# Patient Record
Sex: FEMALE | NOT HISPANIC OR LATINO | Employment: PART TIME | URBAN - METROPOLITAN AREA
[De-identification: names, ages, dates, MRNs, and addresses within clinical notes are randomized per-mention and may not be internally consistent; named-entity substitution may affect disease eponyms.]

---

## 2020-05-08 ENCOUNTER — TELEPHONE (OUTPATIENT)
Dept: INTERNAL MEDICINE CLINIC | Facility: CLINIC | Age: 75
End: 2020-05-08

## 2020-05-08 DIAGNOSIS — R25.1 TREMOR: Primary | ICD-10-CM

## 2020-05-12 ENCOUNTER — TELEPHONE (OUTPATIENT)
Dept: NEUROLOGY | Facility: CLINIC | Age: 75
End: 2020-05-12

## 2020-05-26 ENCOUNTER — CONSULT (OUTPATIENT)
Dept: NEUROLOGY | Facility: CLINIC | Age: 75
End: 2020-05-26
Payer: MEDICARE

## 2020-05-26 VITALS
WEIGHT: 159 LBS | HEART RATE: 83 BPM | DIASTOLIC BLOOD PRESSURE: 76 MMHG | TEMPERATURE: 98 F | SYSTOLIC BLOOD PRESSURE: 124 MMHG

## 2020-05-26 DIAGNOSIS — R25.1 TREMOR: ICD-10-CM

## 2020-05-26 PROCEDURE — 99203 OFFICE O/P NEW LOW 30 MIN: CPT | Performed by: PSYCHIATRY & NEUROLOGY

## 2020-05-26 RX ORDER — EZETIMIBE 10 MG/1
TABLET ORAL DAILY
COMMUNITY
Start: 2019-04-01

## 2020-05-26 RX ORDER — POTASSIUM CHLORIDE 750 MG/1
1 CAPSULE, EXTENDED RELEASE ORAL DAILY
COMMUNITY
Start: 2019-04-01

## 2020-05-26 RX ORDER — TRIAMTERENE AND HYDROCHLOROTHIAZIDE 37.5; 25 MG/1; MG/1
1 CAPSULE ORAL DAILY
COMMUNITY
Start: 2019-04-01

## 2020-06-04 ENCOUNTER — TRANSCRIBE ORDERS (OUTPATIENT)
Dept: LAB | Facility: CLINIC | Age: 75
End: 2020-06-04

## 2020-06-04 ENCOUNTER — APPOINTMENT (OUTPATIENT)
Dept: LAB | Facility: CLINIC | Age: 75
End: 2020-06-04
Payer: MEDICARE

## 2020-06-04 DIAGNOSIS — R25.1 TREMOR: ICD-10-CM

## 2020-06-04 LAB
ALBUMIN SERPL BCP-MCNC: 4.2 G/DL (ref 3.5–5)
ALP SERPL-CCNC: 66 U/L (ref 46–116)
ALT SERPL W P-5'-P-CCNC: 101 U/L (ref 12–78)
ANION GAP SERPL CALCULATED.3IONS-SCNC: 11 MMOL/L (ref 4–13)
AST SERPL W P-5'-P-CCNC: 45 U/L (ref 5–45)
BILIRUB SERPL-MCNC: 0.54 MG/DL (ref 0.2–1)
BUN SERPL-MCNC: 19 MG/DL (ref 5–25)
CALCIUM SERPL-MCNC: 9 MG/DL (ref 8.3–10.1)
CHLORIDE SERPL-SCNC: 100 MMOL/L (ref 100–108)
CO2 SERPL-SCNC: 27 MMOL/L (ref 21–32)
CREAT SERPL-MCNC: 1.01 MG/DL (ref 0.6–1.3)
GFR SERPL CREATININE-BSD FRML MDRD: 56 ML/MIN/1.73SQ M
GLUCOSE P FAST SERPL-MCNC: 92 MG/DL (ref 65–99)
POTASSIUM SERPL-SCNC: 4 MMOL/L (ref 3.5–5.3)
PROT SERPL-MCNC: 7.9 G/DL (ref 6.4–8.2)
SODIUM SERPL-SCNC: 138 MMOL/L (ref 136–145)
T4 FREE SERPL-MCNC: 0.95 NG/DL (ref 0.76–1.46)
TSH SERPL DL<=0.05 MIU/L-ACNC: 4.36 UIU/ML (ref 0.36–3.74)

## 2020-06-04 PROCEDURE — 82390 ASSAY OF CERULOPLASMIN: CPT

## 2020-06-04 PROCEDURE — 82525 ASSAY OF COPPER: CPT

## 2020-06-04 PROCEDURE — 86235 NUCLEAR ANTIGEN ANTIBODY: CPT

## 2020-06-04 PROCEDURE — 80053 COMPREHEN METABOLIC PANEL: CPT

## 2020-06-04 PROCEDURE — 84443 ASSAY THYROID STIM HORMONE: CPT

## 2020-06-04 PROCEDURE — 36415 COLL VENOUS BLD VENIPUNCTURE: CPT

## 2020-06-04 PROCEDURE — 84439 ASSAY OF FREE THYROXINE: CPT

## 2020-06-04 PROCEDURE — 86618 LYME DISEASE ANTIBODY: CPT

## 2020-06-05 ENCOUNTER — TELEPHONE (OUTPATIENT)
Dept: NEUROLOGY | Facility: CLINIC | Age: 75
End: 2020-06-05

## 2020-06-05 LAB
B BURGDOR IGG+IGM SER-ACNC: <0.91 ISR (ref 0–0.9)
CERULOPLASMIN SERPL-MCNC: 23.6 MG/DL (ref 19–39)
ENA SS-A AB SER-ACNC: <0.2 AI (ref 0–0.9)
ENA SS-B AB SER-ACNC: <0.2 AI (ref 0–0.9)

## 2020-06-09 ENCOUNTER — HOSPITAL ENCOUNTER (OUTPATIENT)
Dept: MRI IMAGING | Facility: HOSPITAL | Age: 75
Discharge: HOME | End: 2020-06-09
Attending: PSYCHIATRY & NEUROLOGY
Payer: MEDICARE

## 2020-06-09 DIAGNOSIS — R25.1 TREMOR: ICD-10-CM

## 2020-06-09 PROCEDURE — 70551 MRI BRAIN STEM W/O DYE: CPT

## 2020-06-10 ENCOUNTER — TELEMEDICINE (OUTPATIENT)
Dept: INTERNAL MEDICINE CLINIC | Facility: CLINIC | Age: 75
End: 2020-06-10
Payer: MEDICARE

## 2020-06-10 DIAGNOSIS — E78.00 PURE HYPERCHOLESTEROLEMIA: Primary | ICD-10-CM

## 2020-06-10 DIAGNOSIS — N18.30 CKD (CHRONIC KIDNEY DISEASE) STAGE 3, GFR 30-59 ML/MIN (HCC): ICD-10-CM

## 2020-06-10 DIAGNOSIS — R60.0 EDEMA, LOWER EXTREMITY: ICD-10-CM

## 2020-06-10 LAB — COPPER SERPL-MCNC: 100 UG/DL (ref 72–166)

## 2020-06-10 PROCEDURE — 99214 OFFICE O/P EST MOD 30 MIN: CPT | Performed by: INTERNAL MEDICINE

## 2020-06-19 ENCOUNTER — OFFICE VISIT (OUTPATIENT)
Dept: NEUROLOGY | Facility: CLINIC | Age: 75
End: 2020-06-19
Payer: MEDICARE

## 2020-06-19 VITALS — SYSTOLIC BLOOD PRESSURE: 124 MMHG | TEMPERATURE: 97.6 F | DIASTOLIC BLOOD PRESSURE: 84 MMHG | WEIGHT: 193 LBS

## 2020-06-19 DIAGNOSIS — R25.1 TREMOR: Primary | ICD-10-CM

## 2020-06-19 PROCEDURE — 99215 OFFICE O/P EST HI 40 MIN: CPT | Performed by: PSYCHIATRY & NEUROLOGY

## 2020-09-10 DIAGNOSIS — R25.1 TREMOR: ICD-10-CM

## 2020-09-24 ENCOUNTER — OFFICE VISIT (OUTPATIENT)
Dept: NEUROLOGY | Facility: CLINIC | Age: 75
End: 2020-09-24
Payer: MEDICARE

## 2020-09-24 VITALS
HEART RATE: 95 BPM | DIASTOLIC BLOOD PRESSURE: 72 MMHG | BODY MASS INDEX: 33.63 KG/M2 | SYSTOLIC BLOOD PRESSURE: 124 MMHG | HEIGHT: 64 IN | WEIGHT: 197 LBS | TEMPERATURE: 97.3 F

## 2020-09-24 DIAGNOSIS — G20 PARKINSON DISEASE (HCC): Primary | ICD-10-CM

## 2020-09-24 PROBLEM — G20.A1 PARKINSON DISEASE: Status: ACTIVE | Noted: 2020-05-26

## 2020-09-24 PROCEDURE — 99214 OFFICE O/P EST MOD 30 MIN: CPT | Performed by: PSYCHIATRY & NEUROLOGY

## 2020-09-24 NOTE — ASSESSMENT & PLAN NOTE
In summary, Trang Shen is a 69 y o  right handed female with a history of tremors who presents for follow-up and has an exam notable for a left-sided resting tremor, decreased arm swing with walking, rigidity left greater than right  The patient had been previously started on Sinemet  t i d  which the patient reports is helping, but would like to increase her medication at night as she finds her tremor is worse at that time  The history and exam are most consistent with tremors most likely secondary to Parkinson's disease given the fact that the patient is medication responsive      Plan  - Sinemet  1 tab three times a day, and 1 5 tabs before bed  - Return to clinic for follow up in 3 months

## 2020-09-24 NOTE — PROGRESS NOTES
Assessment/Plan:    Parkinson disease ([facility] )  In summary, Carter Stiles is a 69 y o  right handed female with a history of tremors who presents for follow-up and has an exam notable for a left-sided resting tremor, decreased arm swing with walking, rigidity left greater than right  The patient had been previously started on Sinemet  t i d  which the patient reports is helping, but would like to increase her medication at night as she finds her tremor is worse at that time  The history and exam are most consistent with tremors most likely secondary to Parkinson's disease given the fact that the patient is medication responsive  Plan  - Sinemet  1 tab three times a day, and 1 5 tabs before bed  - Return to clinic for follow up in 3 months      Diagnoses and all orders for this visit:    Parkinson disease ([facility] )  -     carbidopa-levodopa (SINEMET)  mg per tablet; Take 1 tablet by mouth see administration instructions Take one tablet 3 times a day and then take 1 5 tablets at bedtime  Subjective:   Carter Stiles is a 69 y o  female presenting today for follow-up for a left-sided hand and foot resting tremor, it is most likely due to Parkinson's disease  The patient was last seen in June of this year at which point she presented for a new onset tremor of 2 months duration and was started on Sinemet  t i d  (morning, mid day, before bed around 8-9 p m )  She reports the medication is helping but she does report that since the last visit she feels that her tremors are a little bit worse, particularly at night and are disrupting her sleep  The patient wants to know if we are able to increase her nighttime dose of Sinemet  The patient denies any other symptoms such as voice changes, falls, change in walking, swallowing, tremors on her contralateral side       Patient ID: Carter Stiles 69 y o  female    The following portions of the patient's history were reviewed and updated as appropriate: allergies, current medications, past family history, past medical history, past social history, past surgical history and problem list     Objective:  /72 (BP Location: Left arm, Patient Position: Sitting, Cuff Size: Standard)   Pulse 95   Temp (!) 97 3 °F (36 3 °C) (Temporal)   Ht 5' 4" (1 626 m)   Wt 89 4 kg (197 lb)   BMI 33 81 kg/m²     Physical Exam  Vitals signs and nursing note reviewed  Constitutional:       General: She is not in acute distress  Appearance: She is well-developed  She is not diaphoretic  HENT:      Head: Normocephalic and atraumatic  Nose: Nose normal       Mouth/Throat:      Pharynx: No oropharyngeal exudate  Eyes:      General: Lids are normal       Extraocular Movements: Extraocular movements intact  Conjunctiva/sclera: Conjunctivae normal       Pupils: Pupils are equal, round, and reactive to light  Cardiovascular:      Rate and Rhythm: Normal rate  Pulmonary:      Effort: Pulmonary effort is normal  No respiratory distress  Breath sounds: Normal breath sounds  Abdominal:      General: Abdomen is flat  There is no distension  Musculoskeletal:         General: No swelling or tenderness  Skin:     General: Skin is warm and dry  Neurological:      Mental Status: She is alert and oriented to person, place, and time  Psychiatric:         Speech: Speech normal          Behavior: Behavior normal          Thought Content: Thought content normal          Judgment: Judgment normal          Neurological Exam  Mental Status  Awake, alert and oriented to person, place and time  Alert  Recent and remote memory are intact  Speech is normal  Language is fluent with no aphasia  Attention and concentration are normal     Cranial Nerves  CN II: Visual acuity is normal  Visual fields full to confrontation  CN III, IV, VI: Extraocular movements intact bilaterally  Normal lids and orbits bilaterally   Pupils equal round and reactive to light bilaterally  CN V: Facial sensation is normal   CN VII: Full and symmetric facial movement  CN VIII: Hearing is normal   CN IX, X: Palate elevates symmetrically  Normal gag reflex  CN XI: Shoulder shrug strength is normal   CN XII: Tongue midline without atrophy or fasciculations  Motor  Normal muscle bulk throughout  No fasciculations present  Increased muscle tone  Left greater than right  The following abnormal movements were seen: Pill roll tremor on left side  Strength is 5/5 in all four extremities except as noted  Sensory  Sensation is intact to light touch, pinprick, vibration and proprioception in all four extremities  Reflexes  Deep tendon reflexes are 2+ and symmetric except as noted  Coordination  Right: Finger-to-nose normal   Left: Finger-to-nose normal     Gait  Casual gait is normal including stance, stride, and arm swing  Decreased arm swing  Review of Systems   Constitutional: Negative  Negative for appetite change and fever  HENT: Negative  Negative for hearing loss, tinnitus, trouble swallowing and voice change  Eyes: Negative  Negative for photophobia and pain  Respiratory: Negative  Negative for shortness of breath  Cardiovascular: Negative  Negative for palpitations  Gastrointestinal: Negative  Negative for nausea and vomiting  Endocrine: Negative  Negative for cold intolerance  Genitourinary: Negative  Negative for dysuria, frequency and urgency  Musculoskeletal: Negative  Negative for myalgias and neck pain  Skin: Negative  Negative for rash  Allergic/Immunologic: Negative  Neurological: Positive for tremors  Negative for dizziness, seizures, syncope, facial asymmetry, speech difficulty, weakness, light-headedness, numbness and headaches  Hematological: Negative  Does not bruise/bleed easily  Psychiatric/Behavioral: Negative  Negative for confusion, hallucinations and sleep disturbance       The above ROS was reviewed and updated  Current Outpatient Medications on File Prior to Visit   Medication Sig Dispense Refill    diclofenac sodium (VOLTAREN) 1 % APPLY 2 GM TO AFFECTED AREA THREE TIMES A DAY AS NEEDED   ezetimibe (ZETIA) 10 mg tablet Take by mouth Daily      potassium chloride (MICRO-K) 10 MEQ CR capsule Take 1 capsule by mouth Daily      triamterene-hydrochlorothiazide (DYAZIDE) 37 5-25 mg per capsule Take 1 capsule by mouth Daily      [DISCONTINUED] carbidopa-levodopa (SINEMET)  mg per tablet Take 1 tablet by mouth 3 (three) times a day 270 tablet 3     No current facility-administered medications on file prior to visit          Chase Avendaño MD  Medical Director   [facility]  [facility]

## 2020-09-24 NOTE — PATIENT INSTRUCTIONS
Please take ONE Carbidopa Levadopa IR (Yellow Pill) Three times a day in the Morning, Noon, and Afternoon    Please take ONE AND A HALF Carbidopa Levadopa IR (Yellow Pill) One time a day prior to bed

## 2021-01-25 DIAGNOSIS — Z12.31 ENCOUNTER FOR SCREENING MAMMOGRAM FOR BREAST CANCER: Primary | ICD-10-CM

## 2021-01-25 DIAGNOSIS — G20 PARKINSON DISEASE (HCC): ICD-10-CM

## 2021-01-25 NOTE — TELEPHONE ENCOUNTER
Patient calling in  She states she is going to be OUT of sinemet today  I pended below, please sign off, thanks!     Sinemet 1 tab TID and 1 5 at HS

## 2021-07-21 DIAGNOSIS — G20 PARKINSON DISEASE (HCC): ICD-10-CM

## 2021-10-08 DIAGNOSIS — G20 PARKINSON DISEASE (HCC): ICD-10-CM

## 2021-10-19 DIAGNOSIS — G20 PARKINSON DISEASE (HCC): ICD-10-CM

## 2021-10-24 ENCOUNTER — NURSE TRIAGE (OUTPATIENT)
Dept: OTHER | Facility: OTHER | Age: 76
End: 2021-10-24

## 2021-10-24 DIAGNOSIS — G20 PARKINSON DISEASE (HCC): ICD-10-CM

## 2023-08-15 ENCOUNTER — APPOINTMENT (EMERGENCY)
Dept: RADIOLOGY | Facility: HOSPITAL | Age: 78
End: 2023-08-15
Payer: MEDICARE

## 2023-08-15 ENCOUNTER — HOSPITAL ENCOUNTER (EMERGENCY)
Facility: HOSPITAL | Age: 78
Discharge: HOME | End: 2023-08-15
Attending: EMERGENCY MEDICINE | Admitting: EMERGENCY MEDICINE
Payer: MEDICARE

## 2023-08-15 VITALS
SYSTOLIC BLOOD PRESSURE: 146 MMHG | HEART RATE: 81 BPM | RESPIRATION RATE: 18 BRPM | OXYGEN SATURATION: 98 % | TEMPERATURE: 98.1 F | DIASTOLIC BLOOD PRESSURE: 67 MMHG

## 2023-08-15 DIAGNOSIS — M77.52 TENDONITIS OF ANKLE, LEFT: Primary | ICD-10-CM

## 2023-08-15 PROCEDURE — 99283 EMERGENCY DEPT VISIT LOW MDM: CPT

## 2023-08-15 PROCEDURE — 73610 X-RAY EXAM OF ANKLE: CPT

## 2023-08-15 PROCEDURE — 96372 THER/PROPH/DIAG INJ SC/IM: CPT

## 2023-08-15 PROCEDURE — 99284 EMERGENCY DEPT VISIT MOD MDM: CPT | Performed by: EMERGENCY MEDICINE

## 2023-08-15 RX ORDER — KETOROLAC TROMETHAMINE 30 MG/ML
15 INJECTION, SOLUTION INTRAMUSCULAR; INTRAVENOUS ONCE
Status: COMPLETED | OUTPATIENT
Start: 2023-08-15 | End: 2023-08-15

## 2023-08-15 RX ORDER — MELOXICAM 15 MG/1
15 TABLET ORAL DAILY
Qty: 14 TABLET | Refills: 0 | Status: SHIPPED | OUTPATIENT
Start: 2023-08-15

## 2023-08-15 RX ADMIN — KETOROLAC TROMETHAMINE 15 MG: 30 INJECTION, SOLUTION INTRAMUSCULAR; INTRAVENOUS at 12:47

## 2023-08-15 NOTE — ED PROVIDER NOTES
History  Chief Complaint   Patient presents with   • Foot Pain     Pt with foot pain x 1 month. Saw podiatrist who put her on prednisone. States pain is worsening. Worseing swelling around left ankle. No redness. Denies acute injury     The patient is a 78year old female who presents to the ED for evaluation of left ankle pain. The patient states she has had pain in her left ankle for over a month. She has seen a podiatrist for the pain and states she was given a long prednisone taper over about 1 month, after which she was able to walk on the foot/ankle and had some improvement of pain. She states the pain and swelling to the left ankle have increased over the last 2 days and she states it is now too painful to walk on the left ankle. She has tried to contact her podiatrist but has been unsuccessful. She notes the ankle looks slightly darker, but it may be due to sun tan. Does not feel the ankle has been hot to touch. Denies recent injury or fall, previous surgery or injury to the ankle or legs. Denies fevers, chills, change in sensation          Prior to Admission Medications   Prescriptions Last Dose Informant Patient Reported? Taking?   carbidopa-levodopa (SINEMET)  mg per tablet   No No   Sig: Take one tablet 3 times a day and then take 1.5 tablets at bedtime.    diclofenac sodium (VOLTAREN) 1 %   Yes No   Sig: APPLY 2 GM TO AFFECTED AREA THREE TIMES A DAY AS NEEDED.   ezetimibe (ZETIA) 10 mg tablet   Yes No   Sig: Take by mouth Daily   potassium chloride (MICRO-K) 10 MEQ CR capsule   Yes No   Sig: Take 1 capsule by mouth Daily   triamterene-hydrochlorothiazide (DYAZIDE) 37.5-25 mg per capsule   Yes No   Sig: Take 1 capsule by mouth Daily      Facility-Administered Medications: None       Past Medical History:   Diagnosis Date   • Anxiety    • Hepatitis C 04/01/2017    screening Negative   • High cholesterol    • Hip pain    • History of low potassium    • Lower extremity edema    • Obesity    • Osteopenia    • Overweight    • Thigh pain        Past Surgical History:   Procedure Laterality Date   • BLADDER SURGERY     • CHOLECYSTECTOMY     • COLOGUARD (HISTORICAL)  07/02/2018   • COLONOSCOPY  01/01/2007   • DXA PROCEDURE (HISTORICAL)  03/27/2014   • MAMMO (HISTORICAL)  08/31/2016   • REDUCTION MAMMAPLASTY     • REPLACEMENT TOTAL KNEE Bilateral    • TOTAL HIP ARTHROPLASTY Left 01/01/2015   • TOTAL KNEE ARTHROPLASTY Left        Family History   Problem Relation Age of Onset   • Glaucoma Mother    • Hypertension Mother    • Glaucoma Father    • Leukemia Father    • Hypertension Father      I have reviewed and agree with the history as documented. E-Cigarette/Vaping   • E-Cigarette Use Never User      E-Cigarette/Vaping Substances   • Nicotine No    • THC No    • CBD No    • Flavoring No    • Other No    • Unknown No      Social History     Tobacco Use   • Smoking status: Never   • Smokeless tobacco: Never   Vaping Use   • Vaping Use: Never used   Substance Use Topics   • Alcohol use: Yes     Comment: social, Occasional    • Drug use: Never        Review of Systems   Constitutional: Positive for activity change. Negative for fever. Respiratory: Negative for shortness of breath. Cardiovascular: Negative for chest pain. Gastrointestinal: Negative for abdominal pain. Genitourinary: Negative for difficulty urinating. Musculoskeletal: Positive for arthralgias, gait problem and joint swelling. Skin: Negative for rash and wound. Neurological: Negative for weakness and numbness.        Physical Exam  ED Triage Vitals [08/15/23 1108]   Temperature Pulse Respirations Blood Pressure SpO2   98.1 °F (36.7 °C) 105 18 (!) 178/80 95 %      Temp Source Heart Rate Source Patient Position - Orthostatic VS BP Location FiO2 (%)   Oral Monitor Sitting Right arm --      Pain Score       9             Orthostatic Vital Signs  Vitals:    08/15/23 1230 08/15/23 1300 08/15/23 1330 08/15/23 1500   BP: 140/66 138/64 152/74 146/67   Pulse: 82 79 82 81   Patient Position - Orthostatic VS: Sitting Sitting Sitting Sitting       Physical Exam  HENT:      Head: Normocephalic and atraumatic. Cardiovascular:      Rate and Rhythm: Normal rate and regular rhythm. Pulses: Normal pulses. Dorsalis pedis pulses are 2+ on the left side. Heart sounds: Normal heart sounds. Pulmonary:      Effort: Pulmonary effort is normal.      Breath sounds: Normal breath sounds. Musculoskeletal:      Left ankle: Swelling present. No deformity or ecchymosis. Tenderness present over the medial malleolus. No lateral malleolus or base of 5th metatarsal tenderness. Normal pulse. Comments: Skin of ankle slightly more tan than the leg, no erythema or ecchymosis. No signs of cellulitis to the left ankle/foot. No increased calor. Skin:     General: Skin is warm and dry. Findings: No bruising, lesion or rash. Neurological:      Mental Status: She is alert and oriented to person, place, and time. Psychiatric:         Mood and Affect: Mood normal.         Behavior: Behavior normal.         ED Medications  Medications   ketorolac (TORADOL) injection 15 mg (15 mg Intramuscular Given 8/15/23 1247)       Diagnostic Studies  Results Reviewed     None                 XR ankle 3+ views LEFT   Final Result by Diego Barboza MD (08/15 1340)      Soft tissue swelling over the medial malleolus without acute osseous injury. Workstation performed: [id number]               Procedures  POC MSK/Soft Tissue US    Date/Time: 8/15/2023 3:22 PM    Performed by: Nighat Oseguera MD  Authorized by: Nighat Oseguera MD    Patient location:  Bedside  Performed by:   Attending  Other Assisting Provider: Yes (comment) (Resident Wilbert Siegel)    Procedure:     Performed: soft tissue ultrasound    Procedure details:     Exam Type:  Diagnostic    Longitudinal view:  Obtained    Transverse view:  Obtained    Image quality: non-diagnostic      Image availability:  Not saved  Soft tissue ultrasound:     Soft tissue indications: swelling      Anatomic location:  Lower extremity    Soft tissue findings: cobblestoning    Interpretation:     Soft tissue impressions: indeterminate            ED Course                                       Medical Decision Making  DDx: ankle fracture, midfoot fracture, tendonitis, ligamentous injury    No signs of fracture on xray. Amount and/or Complexity of Data Reviewed  External Data Reviewed: radiology. Radiology: ordered. Risk  Prescription drug management. Disposition  Final diagnoses:   Tendonitis of ankle, left     Time reflects when diagnosis was documented in both MDM as applicable and the Disposition within this note     Time User Action Codes Description Comment    8/15/2023  3:12 PM Consuelo Hobson Add [M77.52] Tendonitis of ankle, left       ED Disposition     ED Disposition   Discharge    Condition   Stable    Date/Time   Tue Aug 15, 2023  3:12 PM    Comment   Joanna Collier discharge to home/self care. Follow-up Information     Follow up With Specialties Details Why Contact Info    Verónica Madrid MD Orthopedic Surgery Call in 1 week  [address] [phone]            Patient's Medications   Discharge Prescriptions    DICLOFENAC SODIUM (VOLTAREN) 1 %    Apply 2 g topically 4 (four) times a day       Start Date: 8/15/2023 End Date: --       Order Dose: 2 g       Quantity: 50 g    Refills: 0    MELOXICAM (MOBIC) 15 MG TABLET    Take 1 tablet (15 mg total) by mouth daily       Start Date: 8/15/2023 End Date: --       Order Dose: 15 mg       Quantity: 14 tablet    Refills: 0         PDMP Review     None           ED Provider  Attending physically available and evaluated Joanna Collier. I managed the patient along with the ED Attending.     Electronically Signed by         Consuelo Hobson MD  08/15/23 2800

## 2023-08-15 NOTE — ED ATTENDING ATTESTATION
8/15/2023  Nini SALDANA PresidentDO, saw and evaluated the patient. I have discussed the patient with the resident/non-physician practitioner and agree with the resident's/non-physician practitioner's findings, Plan of Care, and MDM as documented in the resident's/non-physician practitioner's note, except where noted. All available labs and Radiology studies were reviewed. I was present for key portions of any procedure(s) performed by the resident/non-physician practitioner and I was immediately available to provide assistance. At this point I agree with the current assessment done in the Emergency Department. I have conducted an independent evaluation of this patient a history and physical is as follows:    77 yo F coming in for eval of left foot and ankle pain, swelling. She's seen a podiatrist and placed on prednisone with some improvement about a month ago. She tried to contact her podiatrist but has been able to get in touch with him. Pain is worse to touch it with ambulation. No recent surgeries on the ankle, no recent injuries either. On physical exam, the left ankle is mildly tender and swollen overlying the medial malleolus, extending just inferior to 2 distal fibula. Bedside ultrasound suspicious for acute tendinitis. Recommend follow-up with orthopedic surgery, can take as needed Voltaren gel, Mobic. Recommend rest, ice, elevation as well. Return to ER precautions.     ED Course  ED Course as of 08/16/23 2316   Tue Aug 15, 2023   1440 XR ankle 3+ views LEFT         Critical Care Time  Procedures

## 2024-01-11 ENCOUNTER — APPOINTMENT (EMERGENCY)
Dept: RADIOLOGY | Facility: HOSPITAL | Age: 79
DRG: 176 | End: 2024-01-11
Payer: MEDICARE

## 2024-01-11 ENCOUNTER — HOSPITAL ENCOUNTER (INPATIENT)
Facility: HOSPITAL | Age: 79
LOS: 1 days | Discharge: HOME | DRG: 176 | End: 2024-01-12
Attending: EMERGENCY MEDICINE | Admitting: INTERNAL MEDICINE
Payer: MEDICARE

## 2024-01-11 ENCOUNTER — APPOINTMENT (EMERGENCY)
Dept: CT IMAGING | Facility: HOSPITAL | Age: 79
DRG: 176 | End: 2024-01-11
Payer: MEDICARE

## 2024-01-11 ENCOUNTER — APPOINTMENT (INPATIENT)
Dept: NON INVASIVE DIAGNOSTICS | Facility: HOSPITAL | Age: 79
DRG: 176 | End: 2024-01-11
Payer: MEDICARE

## 2024-01-11 ENCOUNTER — APPOINTMENT (INPATIENT)
Dept: VASCULAR ULTRASOUND | Facility: HOSPITAL | Age: 79
DRG: 176 | End: 2024-01-11
Payer: MEDICARE

## 2024-01-11 DIAGNOSIS — E87.6 HYPOKALEMIA: ICD-10-CM

## 2024-01-11 DIAGNOSIS — I26.99 PULMONARY EMBOLISM (HCC): Primary | ICD-10-CM

## 2024-01-11 DIAGNOSIS — G20.A1 PARKINSON DISEASE: ICD-10-CM

## 2024-01-11 PROBLEM — I26.94 MULTIPLE SUBSEGMENTAL PULMONARY EMBOLI WITHOUT ACUTE COR PULMONALE (HCC): Status: ACTIVE | Noted: 2024-01-11

## 2024-01-11 LAB
2HR DELTA HS TROPONIN: -2 NG/L
4HR DELTA HS TROPONIN: -1 NG/L
ALBUMIN SERPL BCP-MCNC: 4.2 G/DL (ref 3.5–5)
ALP SERPL-CCNC: 78 U/L (ref 34–104)
ALT SERPL W P-5'-P-CCNC: <3 U/L (ref 7–52)
ANION GAP SERPL CALCULATED.3IONS-SCNC: 16 MMOL/L
AORTIC ROOT: 3 CM
APICAL FOUR CHAMBER EJECTION FRACTION: 43 %
APTT PPP: 26 SECONDS (ref 23–37)
APTT PPP: 72 SECONDS (ref 23–37)
ASCENDING AORTA: 3.4 CM
AST SERPL W P-5'-P-CCNC: 5 U/L (ref 13–39)
ATRIAL RATE: 82 BPM
ATRIAL RATE: 82 BPM
BASOPHILS # BLD AUTO: 0.02 THOUSANDS/ÂΜL (ref 0–0.1)
BASOPHILS NFR BLD AUTO: 0 % (ref 0–1)
BILIRUB SERPL-MCNC: 1.37 MG/DL (ref 0.2–1)
BNP SERPL-MCNC: 112 PG/ML (ref 0–100)
BSA FOR ECHO PROCEDURE: 1.84 M2
BUN SERPL-MCNC: 10 MG/DL (ref 5–25)
CALCIUM SERPL-MCNC: 9.4 MG/DL (ref 8.4–10.2)
CARDIAC TROPONIN I PNL SERPL HS: 38 NG/L
CARDIAC TROPONIN I PNL SERPL HS: 39 NG/L
CARDIAC TROPONIN I PNL SERPL HS: 40 NG/L
CHLORIDE SERPL-SCNC: 97 MMOL/L (ref 96–108)
CO2 SERPL-SCNC: 20 MMOL/L (ref 21–32)
CREAT SERPL-MCNC: 0.82 MG/DL (ref 0.6–1.3)
D DIMER PPP FEU-MCNC: 9.27 UG/ML FEU
E WAVE DECELERATION TIME: 267 MS
E/A RATIO: 0.56
EOSINOPHIL # BLD AUTO: 0.07 THOUSAND/ÂΜL (ref 0–0.61)
EOSINOPHIL NFR BLD AUTO: 1 % (ref 0–6)
ERYTHROCYTE [DISTWIDTH] IN BLOOD BY AUTOMATED COUNT: 13.6 % (ref 11.6–15.1)
FLUAV RNA RESP QL NAA+PROBE: NEGATIVE
FLUBV RNA RESP QL NAA+PROBE: NEGATIVE
FRACTIONAL SHORTENING: 38 (ref 28–44)
GFR SERPL CREATININE-BSD FRML MDRD: 69 ML/MIN/1.73SQ M
GLUCOSE SERPL-MCNC: 137 MG/DL (ref 65–140)
HCT VFR BLD AUTO: 39.1 % (ref 34.8–46.1)
HGB BLD-MCNC: 12.8 G/DL (ref 11.5–15.4)
IMM GRANULOCYTES # BLD AUTO: 0.04 THOUSAND/UL (ref 0–0.2)
IMM GRANULOCYTES NFR BLD AUTO: 1 % (ref 0–2)
INR PPP: 1.02 (ref 0.84–1.19)
INR PPP: 1.07 (ref 0.84–1.19)
INTERVENTRICULAR SEPTUM IN DIASTOLE (PARASTERNAL SHORT AXIS VIEW): 0.9 CM
INTERVENTRICULAR SEPTUM: 0.9 CM (ref 0.6–1.1)
LAAS-AP2: 18.9 CM2
LAAS-AP4: 18 CM2
LEFT ATRIUM SIZE: 3.5 CM
LEFT ATRIUM VOLUME (MOD BIPLANE): 52 ML
LEFT ATRIUM VOLUME INDEX (MOD BIPLANE): 28.3 ML/M2
LEFT INTERNAL DIMENSION IN SYSTOLE: 2.6 CM (ref 2.1–4)
LEFT VENTRICULAR INTERNAL DIMENSION IN DIASTOLE: 4.2 CM (ref 3.5–6)
LEFT VENTRICULAR POSTERIOR WALL IN END DIASTOLE: 1 CM
LEFT VENTRICULAR STROKE VOLUME: 55 ML
LVSV (TEICH): 55 ML
LYMPHOCYTES # BLD AUTO: 0.4 THOUSANDS/ÂΜL (ref 0.6–4.47)
LYMPHOCYTES NFR BLD AUTO: 7 % (ref 14–44)
MCH RBC QN AUTO: 27.8 PG (ref 26.8–34.3)
MCHC RBC AUTO-ENTMCNC: 32.7 G/DL (ref 31.4–37.4)
MCV RBC AUTO: 85 FL (ref 82–98)
MONOCYTES # BLD AUTO: 0.47 THOUSAND/ÂΜL (ref 0.17–1.22)
MONOCYTES NFR BLD AUTO: 8 % (ref 4–12)
MV E'TISSUE VEL-LAT: 5 CM/S
MV E'TISSUE VEL-SEP: 6 CM/S
MV PEAK A VEL: 1.01 M/S
MV PEAK E VEL: 57 CM/S
MV STENOSIS PRESSURE HALF TIME: 77 MS
MV VALVE AREA P 1/2 METHOD: 2.86
NEUTROPHILS # BLD AUTO: 4.89 THOUSANDS/ÂΜL (ref 1.85–7.62)
NEUTS SEG NFR BLD AUTO: 83 % (ref 43–75)
NRBC BLD AUTO-RTO: 0 /100 WBCS
P AXIS: 51 DEGREES
P AXIS: 54 DEGREES
PA SYSTOLIC PRESSURE: 64 MMHG
PLATELET # BLD AUTO: 260 THOUSANDS/UL (ref 149–390)
PMV BLD AUTO: 11.1 FL (ref 8.9–12.7)
POTASSIUM SERPL-SCNC: 3 MMOL/L (ref 3.5–5.3)
PR INTERVAL: 158 MS
PR INTERVAL: 172 MS
PROT SERPL-MCNC: 7.1 G/DL (ref 6.4–8.4)
PROTHROMBIN TIME: 14 SECONDS (ref 11.6–14.5)
PROTHROMBIN TIME: 14.5 SECONDS (ref 11.6–14.5)
QRS AXIS: 36 DEGREES
QRS AXIS: 47 DEGREES
QRSD INTERVAL: 84 MS
QRSD INTERVAL: 86 MS
QT INTERVAL: 428 MS
QT INTERVAL: 432 MS
QTC INTERVAL: 500 MS
QTC INTERVAL: 504 MS
RBC # BLD AUTO: 4.6 MILLION/UL (ref 3.81–5.12)
RIGHT ATRIUM AREA SYSTOLE A4C: 15.6 CM2
RIGHT VENTRICLE ID DIMENSION: 5.2 CM
RSV RNA RESP QL NAA+PROBE: NEGATIVE
SARS-COV-2 RNA RESP QL NAA+PROBE: NEGATIVE
SL CV LEFT ATRIUM LENGTH A2C: 5.4 CM
SL CV LV EF: 65
SL CV PED ECHO LEFT VENTRICLE DIASTOLIC VOLUME (MOD BIPLANE) 2D: 78 ML
SL CV PED ECHO LEFT VENTRICLE SYSTOLIC VOLUME (MOD BIPLANE) 2D: 24 ML
SODIUM SERPL-SCNC: 133 MMOL/L (ref 135–147)
T WAVE AXIS: 16 DEGREES
T WAVE AXIS: 42 DEGREES
TR MAX PG: 64 MMHG
TR PEAK VELOCITY: 4 M/S
TRICUSPID ANNULAR PLANE SYSTOLIC EXCURSION: 1.9 CM
TRICUSPID VALVE PEAK REGURGITATION VELOCITY: 4.01 M/S
VENTRICULAR RATE: 82 BPM
VENTRICULAR RATE: 82 BPM
WBC # BLD AUTO: 5.89 THOUSAND/UL (ref 4.31–10.16)

## 2024-01-11 PROCEDURE — 85610 PROTHROMBIN TIME: CPT | Performed by: EMERGENCY MEDICINE

## 2024-01-11 PROCEDURE — 93970 EXTREMITY STUDY: CPT

## 2024-01-11 PROCEDURE — 99223 1ST HOSP IP/OBS HIGH 75: CPT | Performed by: INTERNAL MEDICINE

## 2024-01-11 PROCEDURE — 96375 TX/PRO/DX INJ NEW DRUG ADDON: CPT

## 2024-01-11 PROCEDURE — 99205 OFFICE O/P NEW HI 60 MIN: CPT | Performed by: INTERNAL MEDICINE

## 2024-01-11 PROCEDURE — 85730 THROMBOPLASTIN TIME PARTIAL: CPT

## 2024-01-11 PROCEDURE — 93306 TTE W/DOPPLER COMPLETE: CPT

## 2024-01-11 PROCEDURE — 71046 X-RAY EXAM CHEST 2 VIEWS: CPT

## 2024-01-11 PROCEDURE — 83880 ASSAY OF NATRIURETIC PEPTIDE: CPT | Performed by: EMERGENCY MEDICINE

## 2024-01-11 PROCEDURE — 93306 TTE W/DOPPLER COMPLETE: CPT | Performed by: INTERNAL MEDICINE

## 2024-01-11 PROCEDURE — 36415 COLL VENOUS BLD VENIPUNCTURE: CPT

## 2024-01-11 PROCEDURE — 99285 EMERGENCY DEPT VISIT HI MDM: CPT

## 2024-01-11 PROCEDURE — 99291 CRITICAL CARE FIRST HOUR: CPT | Performed by: EMERGENCY MEDICINE

## 2024-01-11 PROCEDURE — 80053 COMPREHEN METABOLIC PANEL: CPT | Performed by: EMERGENCY MEDICINE

## 2024-01-11 PROCEDURE — 85379 FIBRIN DEGRADATION QUANT: CPT | Performed by: EMERGENCY MEDICINE

## 2024-01-11 PROCEDURE — NC001 PR NO CHARGE: Performed by: INTERNAL MEDICINE

## 2024-01-11 PROCEDURE — 96365 THER/PROPH/DIAG IV INF INIT: CPT

## 2024-01-11 PROCEDURE — 0241U HB NFCT DS VIR RESP RNA 4 TRGT: CPT | Performed by: EMERGENCY MEDICINE

## 2024-01-11 PROCEDURE — 84484 ASSAY OF TROPONIN QUANT: CPT | Performed by: EMERGENCY MEDICINE

## 2024-01-11 PROCEDURE — 85730 THROMBOPLASTIN TIME PARTIAL: CPT | Performed by: EMERGENCY MEDICINE

## 2024-01-11 PROCEDURE — G1004 CDSM NDSC: HCPCS

## 2024-01-11 PROCEDURE — 93005 ELECTROCARDIOGRAM TRACING: CPT

## 2024-01-11 PROCEDURE — 71275 CT ANGIOGRAPHY CHEST: CPT

## 2024-01-11 PROCEDURE — 85025 COMPLETE CBC W/AUTO DIFF WBC: CPT | Performed by: EMERGENCY MEDICINE

## 2024-01-11 PROCEDURE — 85610 PROTHROMBIN TIME: CPT

## 2024-01-11 RX ORDER — HEPARIN SODIUM 1000 [USP'U]/ML
6000 INJECTION, SOLUTION INTRAVENOUS; SUBCUTANEOUS ONCE
Status: COMPLETED | OUTPATIENT
Start: 2024-01-11 | End: 2024-01-11

## 2024-01-11 RX ORDER — HEPARIN SODIUM 1000 [USP'U]/ML
3000 INJECTION, SOLUTION INTRAVENOUS; SUBCUTANEOUS EVERY 6 HOURS PRN
Status: DISCONTINUED | OUTPATIENT
Start: 2024-01-11 | End: 2024-01-12

## 2024-01-11 RX ORDER — POTASSIUM CHLORIDE 20 MEQ/1
40 TABLET, EXTENDED RELEASE ORAL ONCE
Status: COMPLETED | OUTPATIENT
Start: 2024-01-11 | End: 2024-01-11

## 2024-01-11 RX ORDER — EZETIMIBE 10 MG/1
10 TABLET ORAL
Status: DISCONTINUED | OUTPATIENT
Start: 2024-01-11 | End: 2024-01-12 | Stop reason: HOSPADM

## 2024-01-11 RX ORDER — TRIAMTERENE AND HYDROCHLOROTHIAZIDE 37.5; 25 MG/1; MG/1
1 TABLET ORAL DAILY
Status: DISCONTINUED | OUTPATIENT
Start: 2024-01-11 | End: 2024-01-12 | Stop reason: HOSPADM

## 2024-01-11 RX ORDER — HEPARIN SODIUM 1000 [USP'U]/ML
6000 INJECTION, SOLUTION INTRAVENOUS; SUBCUTANEOUS EVERY 6 HOURS PRN
Status: DISCONTINUED | OUTPATIENT
Start: 2024-01-11 | End: 2024-01-12

## 2024-01-11 RX ORDER — HEPARIN SODIUM 10000 [USP'U]/100ML
3-30 INJECTION, SOLUTION INTRAVENOUS
Status: DISCONTINUED | OUTPATIENT
Start: 2024-01-11 | End: 2024-01-12

## 2024-01-11 RX ADMIN — TRIAMTERENE AND HYDROCHLOROTHIAZIDE 1 TABLET: 37.5; 25 TABLET ORAL at 16:44

## 2024-01-11 RX ADMIN — CARBIDOPA AND LEVODOPA 2.5 TABLET: 25; 100 TABLET ORAL at 17:55

## 2024-01-11 RX ADMIN — HEPARIN SODIUM 18 UNITS/KG/HR: 10000 INJECTION, SOLUTION INTRAVENOUS at 14:23

## 2024-01-11 RX ADMIN — EZETIMIBE 10 MG: 10 TABLET ORAL at 22:25

## 2024-01-11 RX ADMIN — CARBIDOPA AND LEVODOPA 1 TABLET: 25; 100 TABLET ORAL at 13:21

## 2024-01-11 RX ADMIN — POTASSIUM CHLORIDE 40 MEQ: 1500 TABLET, EXTENDED RELEASE ORAL at 13:12

## 2024-01-11 RX ADMIN — IOHEXOL 85 ML: 350 INJECTION, SOLUTION INTRAVENOUS at 14:02

## 2024-01-11 RX ADMIN — HEPARIN SODIUM 6000 UNITS: 1000 INJECTION INTRAVENOUS; SUBCUTANEOUS at 14:23

## 2024-01-11 NOTE — ASSESSMENT & PLAN NOTE
COVID-19 treated with Paxlovid December 2023  Testing negative for Covid-19  Presents with dyspnea on exertion, continued dry cough, no hemoptysis.  D-dimer 9,   Troponins normal  ECG normal  CTA chest found multiple bilateral subsegmental pulmonary embolism  Started on heparin infusion  Remains hemodynamically stable at this time, not requiring any oxygen  Echo: 65% EF with mild right ventricular dilation    Plan:  Continue heparin infusion, may switch to DOAC pending pulmonology recommendations  Monitor vital signs and oxygenation status  Pulmonary consulted, appreciate recs

## 2024-01-11 NOTE — CONSULTS
Please see formal consult as listed by Ailin lazo written today at 3:19 and cosigned by me at 4:29

## 2024-01-11 NOTE — ED PROVIDER NOTES
History  Chief Complaint   Patient presents with    Shortness of Breath     Pt reports 12/19 COVID+, since then worsening SOB, dizziness, pale, denies CP     78-year-old female presents to the emergency department accompanied by her  for evaluation of dyspnea on exertion.  Patient states that she tested positive for COVID on December 19.  She took Paxlovid for 5 days.  She states she was slow to recover and she has felt persistently short of breath.  Her symptoms are aggravated with exertion and improved with rest.  She denies associated chest pain.  She has not had fevers or chills.  She has occasional cough which is nonproductive.  No hemoptysis.  No abdominal pain.  No lower extremity edema.  No calf or leg swelling.  Patient states that she believes that she has lost weight approximately 20 pounds over the past 3 weeks.  She does endorse poor appetite. Wt Readings from Last 3 Encounters:  01/11/24 : 79 kg (174 lb 2.6 oz)  09/24/20 : 89.4 kg (197 lb)  06/19/20 : 87.5 kg (193 lb)         History provided by:  Patient and spouse   used: No    Shortness of Breath  Severity:  Severe  Onset quality:  Gradual  Duration:  3 weeks  Timing:  Constant  Progression:  Worsening  Chronicity:  New  Context: activity and URI    Relieved by:  Rest  Worsened by:  Exertion  Ineffective treatments:  Rest  Associated symptoms: cough    Associated symptoms: no abdominal pain, no chest pain, no fever, no hemoptysis, no sore throat, no sputum production, no vomiting and no wheezing    Risk factors: no hx of PE/DVT, no prolonged immobilization and no recent surgery        Prior to Admission Medications   Prescriptions Last Dose Informant Patient Reported? Taking?   Diclofenac Sodium (VOLTAREN) 1 % Unknown  No No   Sig: Apply 2 g topically 4 (four) times a day   carbidopa-levodopa (SINEMET)  mg per tablet 1/11/2024  No Yes   Sig: Take one tablet 3 times a day and then take 1.5 tablets at bedtime.    Patient taking differently: 2.5 tablets 4 (four) times a day Take one tablet 3 times a day and then take 1.5 tablets at bedtime.   diclofenac sodium (VOLTAREN) 1 % Unknown  Yes No   Sig: APPLY 2 GM TO AFFECTED AREA THREE TIMES A DAY AS NEEDED.   ezetimibe (ZETIA) 10 mg tablet 1/11/2024  Yes Yes   Sig: Take by mouth Daily   meloxicam (Mobic) 15 mg tablet Unknown  No No   Sig: Take 1 tablet (15 mg total) by mouth daily   potassium chloride (MICRO-K) 10 MEQ CR capsule 1/11/2024  Yes Yes   Sig: Take 1 capsule by mouth Daily   triamterene-hydrochlorothiazide (DYAZIDE) 37.5-25 mg per capsule 1/11/2024  Yes Yes   Sig: Take 1 capsule by mouth Daily      Facility-Administered Medications: None       Past Medical History:   Diagnosis Date    Anxiety     Hepatitis C 04/01/2017    screening Negative    High cholesterol     Hip pain     History of low potassium     Lower extremity edema     Obesity     Osteopenia     Overweight     Thigh pain        Past Surgical History:   Procedure Laterality Date    BLADDER SURGERY      CHOLECYSTECTOMY      COLOGUARD (HISTORICAL)  07/02/2018    COLONOSCOPY  01/01/2007    DXA PROCEDURE (HISTORICAL)  03/27/2014    MAMMO (HISTORICAL)  08/31/2016    REDUCTION MAMMAPLASTY      REPLACEMENT TOTAL KNEE Bilateral     TOTAL HIP ARTHROPLASTY Left 01/01/2015    TOTAL KNEE ARTHROPLASTY Left        Family History   Problem Relation Age of Onset    Glaucoma Mother     Hypertension Mother     Glaucoma Father     Leukemia Father     Hypertension Father      I have reviewed and agree with the history as documented.    E-Cigarette/Vaping    E-Cigarette Use Never User      E-Cigarette/Vaping Substances    Nicotine No     THC No     CBD No     Flavoring No     Other No     Unknown No      Social History     Tobacco Use    Smoking status: Never    Smokeless tobacco: Never   Vaping Use    Vaping status: Never Used   Substance Use Topics    Alcohol use: Yes     Comment: social, Occasional     Drug use: Never        Review of Systems   Constitutional:  Negative for appetite change and fever.   HENT:  Negative for sore throat.    Respiratory:  Positive for cough and shortness of breath. Negative for hemoptysis, sputum production and wheezing.    Cardiovascular:  Negative for chest pain and leg swelling.   Gastrointestinal:  Negative for abdominal pain, nausea and vomiting.   Genitourinary:  Negative for dysuria.   Neurological:  Negative for weakness.   All other systems reviewed and are negative.      Physical Exam  Physical Exam  Constitutional:       General: She is in acute distress.      Appearance: She is well-developed. She is not ill-appearing or diaphoretic.   HENT:      Head: Normocephalic.      Nose: Nose normal.      Mouth/Throat:      Pharynx: No oropharyngeal exudate.   Eyes:      Conjunctiva/sclera: Conjunctivae normal.      Pupils: Pupils are equal, round, and reactive to light.   Cardiovascular:      Rate and Rhythm: Normal rate and regular rhythm.      Heart sounds: Normal heart sounds.   Pulmonary:      Effort: Pulmonary effort is normal. No tachypnea.      Breath sounds: Examination of the right-lower field reveals decreased breath sounds. Examination of the left-lower field reveals decreased breath sounds. Decreased breath sounds present. No wheezing, rhonchi or rales.   Abdominal:      General: Bowel sounds are normal. There is no distension.      Palpations: Abdomen is soft.      Tenderness: There is no abdominal tenderness. There is no guarding or rebound.   Musculoskeletal:         General: No tenderness or deformity. Normal range of motion.      Cervical back: Normal range of motion and neck supple.      Right lower leg: No tenderness. No edema.      Left lower leg: No tenderness. No edema.   Lymphadenopathy:      Cervical: No cervical adenopathy.   Skin:     General: Skin is warm and dry.      Findings: No rash.   Neurological:      Mental Status: She is alert and oriented to person, place, and  time.      Cranial Nerves: No cranial nerve deficit.      Sensory: No sensory deficit.      Motor: Tremor present. No abnormal muscle tone.      Coordination: Coordination is intact. Coordination normal.      Gait: Gait normal.      Deep Tendon Reflexes: Reflexes are normal and symmetric.   Psychiatric:         Behavior: Behavior normal.         Thought Content: Thought content normal.         Judgment: Judgment normal.         Vital Signs  ED Triage Vitals   Temperature Pulse Respirations Blood Pressure SpO2   01/11/24 1213 01/11/24 1005 01/11/24 1005 01/11/24 1005 01/11/24 1005   98.5 °F (36.9 °C) 83 22 114/71 95 %      Temp Source Heart Rate Source Patient Position - Orthostatic VS BP Location FiO2 (%)   01/11/24 1213 01/11/24 1005 01/11/24 1005 01/11/24 1005 --   Oral Monitor Sitting Right arm       Pain Score       01/11/24 1005       No Pain           Vitals:    01/11/24 1415 01/11/24 1445 01/11/24 1515 01/11/24 1600   BP: 158/65 158/70 158/70 136/82   Pulse: 83 84 84 88   Patient Position - Orthostatic VS:  Sitting  Lying         Visual Acuity      ED Medications  Medications   heparin (porcine) 25,000 units in 0.45% NaCl 250 mL infusion (premix) (18 Units/kg/hr × 75 kg (Order-Specific) Intravenous New Bag 1/11/24 1423)   heparin (porcine) injection 6,000 Units (has no administration in time range)   heparin (porcine) injection 3,000 Units (has no administration in time range)   carbidopa-levodopa (SINEMET)  mg per tablet 2.5 tablet (has no administration in time range)   ezetimibe (ZETIA) tablet 10 mg (has no administration in time range)   triamterene-hydrochlorothiazide (MAXZIDE-25) 37.5-25 mg per tablet 1 tablet (has no administration in time range)   potassium chloride (K-DUR,KLOR-CON) CR tablet 40 mEq (40 mEq Oral Given 1/11/24 1312)   carbidopa-levodopa (SINEMET)  mg per tablet 1 tablet (1 tablet Oral Given 1/11/24 1321)   iohexol (OMNIPAQUE) 350 MG/ML injection (MULTI-DOSE) 85 mL (85 mL  Intravenous Given 1/11/24 1402)   heparin (porcine) injection 6,000 Units (6,000 Units Intravenous Given 1/11/24 1423)       Diagnostic Studies  Results Reviewed       Procedure Component Value Units Date/Time    HS Troponin I 4hr [[id number]]  (Normal) Collected: 01/11/24 1435    Lab Status: Final result Specimen: Blood from Arm, Left Updated: 01/11/24 1508     hs TnI 4hr 39 ng/L      Delta 4hr hsTnI -1 ng/L     FLU/RSV/COVID - if FLU/RSV clinically relevant [[id number]]  (Normal) Collected: 01/11/24 1311    Lab Status: Final result Specimen: Nares from Nose Updated: 01/11/24 1359     SARS-CoV-2 Negative     INFLUENZA A PCR Negative     INFLUENZA B PCR Negative     RSV PCR Negative    Narrative:      FOR PEDIATRIC PATIENTS - copy/paste COVID Guidelines URL to browser: [email or web address]    SARS-CoV-2 assay is a Nucleic Acid Amplification assay intended for the  qualitative detection of nucleic acid from SARS-CoV-2 in nasopharyngeal  swabs. Results are for the presumptive identification of SARS-CoV-2 RNA.    Positive results are indicative of infection with SARS-CoV-2, the virus  causing COVID-19, but do not rule out bacterial infection or co-infection  with other viruses. Laboratories within the [place] and its  territories are required to report all positive results to the appropriate  public health authorities. Negative results do not preclude SARS-CoV-2  infection and should not be used as the sole basis for treatment or other  patient management decisions. Negative results must be combined with  clinical observations, patient history, and epidemiological information.  This test has not been FDA cleared or approved.    This test has been authorized by FDA under an Emergency Use Authorization  (EUA). This test is only authorized for the duration of time the  declaration that circumstances exist justifying the authorization of the  emergency use of an in  vitro diagnostic tests for detection of SARS-CoV-2  virus and/or diagnosis of COVID-19 infection under section 564(b)(1) of  the Act, 21 U.S.C. 360bbb-3(b)(1), unless the authorization is terminated  or revoked sooner. The test has been validated but independent review by FDA  and CLIA is pending.    Test performed using [employer] GeneXpert: This RT-PCR assay targets N2,  a region unique to SARS-CoV-2. A conserved region in the E-gene was chosen  for pan-Sarbecovirus detection which includes SARS-CoV-2.    According to CMS-2020-01-R, this platform meets the definition of high-throughput technology.    D-Dimer [[id number]]  (Abnormal) Collected: 01/11/24 1311    Lab Status: Final result Specimen: Blood from Arm, Right Updated: 01/11/24 1342     D-Dimer, Quant 9.27 ug/ml FEU     Narrative:      In the evaluation for possible pulmonary embolism, in the appropriate (Well's Score of 4 or less) patient, the age adjusted d-dimer cutoff for this patient can be calculated as:    Age x 0.01 (in ug/mL) for Age-adjusted D-dimer exclusion threshold for a patient over 50 years.    B-Type Natriuretic Peptide(BNP) [[id number]]  (Abnormal) Collected: 01/11/24 1013    Lab Status: Final result Specimen: Blood from Arm, Right Updated: 01/11/24 1332      pg/mL     Protime-INR [[id number]]  (Normal) Collected: 01/11/24 1311    Lab Status: Final result Specimen: Blood from Arm, Right Updated: 01/11/24 1328     Protime 14.0 seconds      INR 1.02    APTT [[id number]]  (Normal) Collected: 01/11/24 1311    Lab Status: Final result Specimen: Blood from Arm, Right Updated: 01/11/24 1328     PTT 26 seconds     HS Troponin I 2hr [[id number]]  (Normal) Collected: 01/11/24 1215    Lab Status: Final result Specimen: Blood from Arm, Right Updated: 01/11/24 1252     hs TnI 2hr 38 ng/L      Delta 2hr hsTnI -2 ng/L     HS Troponin 0hr (reflex protocol) [[id number]]  (Normal) Collected: 01/11/24 1013    Lab Status: Final result Specimen: Blood from Arm,  Right Updated: 01/11/24 1059     hs TnI 0hr 40 ng/L     Comprehensive metabolic panel [[id number]]  (Abnormal) Collected: 01/11/24 1013    Lab Status: Final result Specimen: Blood from Arm, Right Updated: 01/11/24 1055     Sodium 133 mmol/L      Potassium 3.0 mmol/L      Chloride 97 mmol/L      CO2 20 mmol/L      ANION GAP 16 mmol/L      BUN 10 mg/dL      Creatinine 0.82 mg/dL      Glucose 137 mg/dL      Calcium 9.4 mg/dL      AST 5 U/L      ALT <3 U/L      Alkaline Phosphatase 78 U/L      Total Protein 7.1 g/dL      Albumin 4.2 g/dL      Total Bilirubin 1.37 mg/dL      eGFR 69 ml/min/1.73sq m     Narrative:      National Kidney Disease Foundation guidelines for Chronic Kidney Disease (CKD):     Stage 1 with normal or high GFR (GFR > 90 mL/min/1.73 square meters)    Stage 2 Mild CKD (GFR = 60-89 mL/min/1.73 square meters)    Stage 3A Moderate CKD (GFR = 45-59 mL/min/1.73 square meters)    Stage 3B Moderate CKD (GFR = 30-44 mL/min/1.73 square meters)    Stage 4 Severe CKD (GFR = 15-29 mL/min/1.73 square meters)    Stage 5 End Stage CKD (GFR <15 mL/min/1.73 square meters)  Note: GFR calculation is accurate only with a steady state creatinine    CBC and differential [[id number]]  (Abnormal) Collected: 01/11/24 1013    Lab Status: Final result Specimen: Blood from Arm, Right Updated: 01/11/24 1036     WBC 5.89 Thousand/uL      RBC 4.60 Million/uL      Hemoglobin 12.8 g/dL      Hematocrit 39.1 %      MCV 85 fL      MCH 27.8 pg      MCHC 32.7 g/dL      RDW 13.6 %      MPV 11.1 fL      Platelets 260 Thousands/uL      nRBC 0 /100 WBCs      Neutrophils Relative 83 %      Immat GRANS % 1 %      Lymphocytes Relative 7 %      Monocytes Relative 8 %      Eosinophils Relative 1 %      Basophils Relative 0 %      Neutrophils Absolute 4.89 Thousands/µL      Immature Grans Absolute 0.04 Thousand/uL      Lymphocytes Absolute 0.40 Thousands/µL      Monocytes Absolute 0.47 Thousand/µL      Eosinophils Absolute 0.07 Thousand/µL       "Basophils Absolute 0.02 Thousands/µL     [place] draw [[id number]] Collected: 01/11/24 1013    Lab Status: In process Specimen: Blood from Arm, Right Updated: 01/11/24 1027    Narrative:      The following orders were created for panel order [place] draw.  Procedure                               Abnormality         Status                     ---------                               -----------         ------                     Lavender Top 7ml on hold[[id number]]                         In process                   Please view results for these tests on the individual orders.                   CTA ED chest PE study   Final Result by Tawnya Latif MD (01/11 [id number])      Diffuse bilateral pulmonary emboli within the bilateral main pulmonary arteries with extension into the segmental and subsegmental pulmonary arteries.      The calculated ratio of right ventricular to left ventricular diameter (RV/LV ratio) is 1.3.      There is a critical finding of acute PE with RV/LV ratio >0.9. Based on PERT algorithm recommendations:    \"  Order STAT biomarkers including troponin, NT-BNP or BNP    \"  Calculate PESI score:   o  If PESI score falls in I-III, with negative biomarkers, please order a PERT priority ECHO.   o  If PESI score falls in IV-V or with positive biomarkers, please order STAT ECHO and alert the campus PERT via PAC at [phone].      I personally discussed this study with LEENA MCDONALD on 1/11/2024 2:34 PM.            Workstation performed: [id number]         XR chest 2 views   ED Interpretation by Leena Mcdonald DO (01/11 8397)   No acute abnormality      Final Result by Rehan Finney MD (01/11 9585)      No acute cardiopulmonary disease.                  Resident: MEJIA MCKEON I, the attending radiologist, have reviewed the images and agree with the final report above.      Workstation performed: [id number]          VAS VENOUS DUPLEX - LOWER LIMB BILATERAL    (Results Pending)        "       Procedures  ECG 12 Lead Documentation Only    Date/Time: 1/11/2024 12:47 PM    Performed by: Alicia Chaparro DO  Authorized by: Alicia Chaparro DO    Indications / Diagnosis:  Dyspnea  ECG reviewed by me, the ED Provider: yes    Patient location:  ED  Previous ECG:     Previous ECG:  Unavailable  Interpretation:     Interpretation: non-specific    Quality:     Tracing quality:  Limited by artifact  Rate:     ECG rate:  82    ECG rate assessment: normal    Rhythm:     Rhythm: sinus rhythm    Ectopy:     Ectopy: none    QRS:     QRS axis:  Normal  ST segments:     ST segments:  Non-specific  Q waves:     Q waves:  V1 and V2  Other findings:     Other findings: prolonged qTc interval    ECG 12 Lead Documentation Only    Date/Time: 1/11/2024 2:26 PM    Performed by: Alicia Chaparro DO  Authorized by: Alicia Chaparro DO    Indications / Diagnosis:  Repeat for URIAS work up  ECG reviewed by me, the ED Provider: yes    Patient location:  ED  Previous ECG:     Previous ECG:  Compared to current    Comparison ECG info:  Earlier today    Similarity:  No change  Interpretation:     Interpretation: non-specific    Rate:     ECG rate:  82    ECG rate assessment: normal    Rhythm:     Rhythm: sinus rhythm    Ectopy:     Ectopy: none    QRS:     QRS axis:  Normal  Other findings:     Other findings: prolonged qTc interval    CriticalCare Time    Date/Time: 1/11/2024 4:31 PM    Performed by: Alicia Chaparro DO  Authorized by: Alicia Chaparro DO    Critical care provider statement:     Critical care time (minutes):  45    Critical care time was exclusive of:  Separately billable procedures and treating other patients and teaching time    Critical care was necessary to treat or prevent imminent or life-threatening deterioration of the following conditions:  Respiratory failure and circulatory failure    Critical care was time spent personally by me on the following activities:  Blood draw for specimens, obtaining history from patient or surrogate,  development of treatment plan with patient or surrogate, discussions with consultants, evaluation of patient's response to treatment, examination of patient, ordering and performing treatments and interventions, ordering and review of laboratory studies, ordering and review of radiographic studies, re-evaluation of patient's condition and review of old charts    I assumed direction of critical care for this patient from another provider in my specialty: no             ED Course                                             Medical Decision Making  78-year-old female presents the emergency department for evaluation of dyspnea.  Differential diagnosis includes but is not limited to acute pulmonary embolism, cardiac dysrhythmia, myocardial ischemia, renal failure, pneumonia, pleural effusion, complication related to recent COVID infection, RSV, influenza, anemia.    Problems Addressed:  Hypokalemia: acute illness or injury  Pulmonary embolism (HCC): acute illness or injury    Amount and/or Complexity of Data Reviewed  Independent Historian: spouse     Details: Patient's  at bedside help provide history  Labs: ordered.     Details: Labs ordered and independently interpreted by me, patient with mild hypokalemia, hyponatremia and elevated D-dimer which prompted ordering of CT chest PE protocol.  Radiology: ordered and independent interpretation performed.     Details: X-ray ordered and independently interpreted by me, my interpretation was no acute abnormality.  CT ordered by me and interpreted by radiology.  ECG/medicine tests: ordered and independent interpretation performed.  Discussion of management or test interpretation with external provider(s): Case discussed with Dr. Morelos of internal medicine.  Patient will be admitted to the stepdown level to unit for continued treatment with heparin.  Pulmonary consulted and evaluating patient as well.    Risk  Prescription drug management.  Decision regarding  hospitalization.        PESI  Age: 78 years old  Sex: 0  History of Cancer: 0  History of Heart Failure: 0  History of Chronic Lung Disease: 0  Heart rate greater than or equal to 110: 0  Systolic BP < 100 mmH  Respiratory rate greater than or equal to 30: 0  Temperature <36°C/96.8°F: 0  Altered Mental Status (Disorientation, lethargy, stupor, or coma): 0  O2 saturation <90%: 0  PESI Score Results: 0        Disposition  Final diagnoses:   Pulmonary embolism (HCC)   Hypokalemia     Time reflects when diagnosis was documented in both MDM as applicable and the Disposition within this note       Time User Action Codes Description Comment    2024  2:49 PM Alicia Chaparro [I26.99] Pulmonary embolism (HCC)     2024  2:49 PM Alicia Chaparro [E87.6] Hypokalemia           ED Disposition       ED Disposition   Admit    Condition   Stable    Date/Time   u 2024  2:49 PM    Comment   Case was discussed with Dr. Fine and the patient's admission status was agreed to be Admission Status: inpatient status to the service of Dr. Fine .               Follow-up Information    None         Patient's Medications   Discharge Prescriptions    No medications on file       No discharge procedures on file.    PDMP Review       None            ED Provider  Electronically Signed by             Alicia Chaparro DO  [phone]

## 2024-01-11 NOTE — ASSESSMENT & PLAN NOTE
COVID-19 treated with Paxlovid December 2023  Presents with dyspnea on exertion, continued dry cough, no hemoptysis.  D-dimer 9,   CTA chest found multiple bilateral subsegmental pulmonary embolism  Started on heparin infusion  Remains hemodynamically stable at this time, not requiring any oxygen    Plan:  Continue heparin infusion  Stepdown level 2  Echocardiogram underway  Monitor vital signs and oxygenation status  Pulmonary consulted, appreciate recs  AM labs  Case management consult

## 2024-01-11 NOTE — Clinical Note
Case was discussed with Dr. Fine and the patient's admission status was agreed to be Admission Status: inpatient status to the service of Dr. Fine .

## 2024-01-11 NOTE — CONSULTS
"Consultation - Pulmonary Medicine   Kamilla Pat 78 y.o. female MRN: [id number]  Unit/Bed#: ED-35 Encounter: [id number]      Assessment/Plan:    1.  Acute pulmonary insufficiency likely multifaceted as listed below        -Patient currently on room air 97%, does not wear home O2        -Continue saturations greater than 88%        -Pulmonary toileting: Deep breathing cough out of bed as tolerated incentive spirometry    2.  Acute B/L likely provoked PE w/ possible RV strain        -Bilateral main pulmonary arteries extension into segmental and subsegmental arteries         -Likely secondary to sedentary lifestyle         -Will need to update all cancer screenings         -BNP- 112, troponin unremarkable, VS stable         -PESI score-Class II low risk- 1.7-3.5% 30-day mortality         -Will need minimum 6 months anticoagulation         -No indication for lytics         -If echo shows RV strain will need to discuss with IR for thrombectomy    3.  Parkinson's disease        -Continue supportive care        -Continue Sinemet        History of Present Illness   Physician Requesting Consult: Monse Fine MD  Reason for Consult / Principal Problem: PE  Hx and PE limited by: nothing  Chief Complaint: \"I felt short of breath\"  HPI: Kamilla Pat is a 78 y.o.  female who presented to [facility] with complaints of shortness of breath.  Patient has past medical history of anxiety, hepatitis C, and hip pain.  Patient reports that she was diagnosed with COVID on 12/19/2023 patient reports she completed course of Paxlovid.  Patient reports that it was very slow to recover.  Patient reports that over approximately the last week her shortness of breath had increased significantly in severity.  Patient reports that due to the persistency of her shortness of breath she was evaluated emergency department.  Upon ED admission patient was noted to have large PE.     Pulmonary was consulted for pulmonary " embolism.  Today upon examination.  Patient reports that she is somewhat short of breath.  No significant overnight events reported.  Patient does report that she has been overall not particularly mobile since COVID and her history of Parkinson's.  Patient denies any cancer history or family history of clotting disorders.  Patient currently denying any fevers, chills, hemoptysis, headaches, night sweats, pleuritic chest pain, or palpitations.     From pulmonary standpoint, patient does not follow with a pulmonologist.  Patient has been a lifelong non-smoker.  Patient denies ever being diagnosed with COPD or asthma.  Patient has not had any formal PFT testing.  Patient is currently not maintained on oxygen or maintenance inhalers.  Patient denies any occupational exposures as she works as a .  Patient denies any symptoms of GERD, KIYA, seasonal allergies, or postnasal drip.  Patient denies any recent acute exposures to dust, mold, spices, or silica.    Inpatient consult to Pulmonology  Consult performed by: RADHA Ruiz  Consult ordered by: Anthony Willis MD          Review of Systems   Constitutional:  Negative for chills and fever.   HENT:  Negative for ear pain and sore throat.    Eyes:  Negative for pain and visual disturbance.   Respiratory:  Positive for cough and shortness of breath. Negative for apnea, choking, chest tightness, wheezing and stridor.    Cardiovascular:  Negative for chest pain and palpitations.   Gastrointestinal:  Negative for abdominal pain and vomiting.   Genitourinary:  Negative for dysuria and hematuria.   Musculoskeletal:  Negative for arthralgias and back pain.   Skin:  Negative for color change and rash.   Neurological:  Negative for seizures and syncope.   Psychiatric/Behavioral:  Negative for agitation.    All other systems reviewed and are negative.      Historical Information   Past Medical History:   Diagnosis Date    Anxiety     Hepatitis C 04/01/2017  "   screening Negative    High cholesterol     Hip pain     History of low potassium     Lower extremity edema     Obesity     Osteopenia     Overweight     Thigh pain      Past Surgical History:   Procedure Laterality Date    BLADDER SURGERY      CHOLECYSTECTOMY      COLOGUARD (HISTORICAL)  07/02/2018    COLONOSCOPY  01/01/2007    DXA PROCEDURE (HISTORICAL)  03/27/2014    MAMMO (HISTORICAL)  08/31/2016    REDUCTION MAMMAPLASTY      REPLACEMENT TOTAL KNEE Bilateral     TOTAL HIP ARTHROPLASTY Left 01/01/2015    TOTAL KNEE ARTHROPLASTY Left      Social History   Social History     Substance and Sexual Activity   Alcohol Use Yes    Comment: social, Occasional      Social History     Substance and Sexual Activity   Drug Use Never     Social History     Tobacco Use   Smoking Status Never   Smokeless Tobacco Never     E-Cigarette/Vaping    E-Cigarette Use Never User      E-Cigarette/Vaping Substances    Nicotine No     THC No     CBD No     Flavoring No     Other No     Unknown No      Occupational History: Noncontributory    Family History:   Family History   Problem Relation Age of Onset    Glaucoma Mother     Hypertension Mother     Glaucoma Father     Leukemia Father     Hypertension Father        Meds/Allergies   pertinent pulmonary meds have been reviewed    No Known Allergies    Objective   Vitals: Blood pressure 158/70, pulse 84, temperature 98.5 °F (36.9 °C), temperature source Oral, resp. rate 20, height 5' 4\" (1.626 m), weight 79 kg (174 lb 2.6 oz), SpO2 97%.,Body mass index is 29.9 kg/m².  No intake or output data in the 24 hours ending 01/11/24 1519  Invasive Devices       Peripheral Intravenous Line  Duration             Peripheral IV 01/11/24 Left Antecubital <1 day    Peripheral IV 01/11/24 Right Antecubital <1 day                    Physical Exam  Constitutional:       General: She is not in acute distress.     Appearance: Normal appearance. She is normal weight. She is not ill-appearing.   HENT:      " "Head: Normocephalic and atraumatic.      Nose: Nose normal. No congestion or rhinorrhea.      Mouth/Throat:      Mouth: Mucous membranes are dry.   Cardiovascular:      Rate and Rhythm: Normal rate and regular rhythm.      Pulses: Normal pulses.      Heart sounds: Normal heart sounds. No murmur heard.     No friction rub. No gallop.   Pulmonary:      Effort: Pulmonary effort is normal. No respiratory distress.      Breath sounds: No stridor. No wheezing, rhonchi or rales.      Comments: Some diminished aeration  Chest:      Chest wall: No tenderness.   Abdominal:      General: Abdomen is flat. Bowel sounds are normal. There is no distension.      Palpations: Abdomen is soft. There is no mass.   Musculoskeletal:         General: No swelling or tenderness. Normal range of motion.      Cervical back: Normal range of motion. No rigidity or tenderness.   Skin:     General: Skin is warm and dry.      Coloration: Skin is not jaundiced or pale.   Neurological:      General: No focal deficit present.      Mental Status: She is alert and oriented to person, place, and time. Mental status is at baseline.   Psychiatric:         Mood and Affect: Mood normal.         Behavior: Behavior normal.         Lab Results: I have personally reviewed pertinent lab results., ABG: No results found for: \"PHART\", \"XJE2YZZ\", \"PO2ART\", \"PCT4TCK\", \"X5ARPAUZ\", \"BEART\", \"SOURCE\", BNP:   Lab Results   Component Value Date     (H) 01/11/2024   , CBC:   Lab Results   Component Value Date    WBC 5.89 01/11/2024    HGB 12.8 01/11/2024    HCT 39.1 01/11/2024    MCV 85 01/11/2024     01/11/2024    RBC 4.60 01/11/2024    MCH 27.8 01/11/2024    MCHC 32.7 01/11/2024    RDW 13.6 01/11/2024    MPV 11.1 01/11/2024    NRBC 0 01/11/2024   , CMP:   Lab Results   Component Value Date    SODIUM 133 (L) 01/11/2024    K 3.0 (L) 01/11/2024    CL 97 01/11/2024    CO2 20 (L) 01/11/2024    BUN 10 01/11/2024    CREATININE 0.82 01/11/2024    CALCIUM 9.4 " "01/11/2024    AST 5 (L) 01/11/2024    ALT <3 (L) 01/11/2024    ALKPHOS 78 01/11/2024    EGFR 69 01/11/2024   , PT/INR:   Lab Results   Component Value Date    INR 1.02 01/11/2024           Imaging Studies: I have personally reviewed pertinent films in PACS     Chest CT bilateral PEs    EKG, Pathology, and Other Studies: I have personally reviewed pertinent films in PACS     Echo pending    Pulmonary Results (PFTs, PSG): I have personally reviewed pertinent films in PACS     No PFTs recorded    Code Status: Level 1 - Full Code    Portions of the record may have been created with voice recognition software.  Occasional wrong word or \"sound a like\" substitutions may have occurred due to the inherent limitations of voice recognition software.  Read the chart carefully and recognize, using context, where substitutions have occurred.  "

## 2024-01-11 NOTE — H&P
[facility]  H&P  Name: Kamilla Pat 78 y.o. female I MRN: [id number]  Unit/Bed#: ED-35 I Date of Admission: 1/11/2024   Date of Service: 1/11/2024 I Hospital Day: 0      Assessment/Plan   * Multiple subsegmental pulmonary emboli without acute cor pulmonale (HCC)  Assessment & Plan  COVID-19 treated with Paxlovid December 2023  Testing negative for Covid-19  Presents with dyspnea on exertion, continued dry cough, no hemoptysis.  D-dimer 9,   Troponins normal  ECG normal  CTA chest found multiple bilateral subsegmental pulmonary embolism  Started on heparin infusion  Remains hemodynamically stable at this time, not requiring any oxygen    Plan:  Continue heparin infusion  Stepdown level 2  Echocardiogram underway  Monitor vital signs and oxygenation status  Pulmonary consulted, appreciate recs  AM labs  Case management consult    Pure hypercholesterolemia  Assessment & Plan  Continue Zetia 10 mg daily    Parkinson disease  Assessment & Plan  Continue Sinemet home regimen           VTE Pharmacologic Prophylaxis: VTE Score: 6 High Risk (Score >/= 5) - Pharmacological DVT Prophylaxis Ordered: heparin drip. Sequential Compression Devices Ordered.  Code Status: Level 1 - Full Code confirmed with the patient  Discussion with family: Updated  () at bedside.    Anticipated Length of Stay: Patient will be admitted on an inpatient basis with an anticipated length of stay of greater than 2 midnights secondary to multiple subsegmental PE.    Chief Complaint: Dyspnea on exertion    History of Present Illness:  Kamilla Pat is a 78 y.o. female with a PMH of Parkinson's disease, CKD, recent COVID-19 illness last month, hyperlipidemia who presents with dyspnea on exertion and a dry cough.  Patient states that she had COVID-19 in December 2023 and took Paxlovid for 5 day.  Since that time, patient has been persistently short of breath, worse with exertion.  She also has  had a dry cough.  She states that more recently she has felt different kind of tremors describing it as feeling different.  She denies any chest pain, pressure, discomfort, back pain, nausea or vomiting, abdominal pain, palpitations or diaphoresis, lightheadedness or dizziness.  She denies any hemoptysis or near syncope.    She was evaluated in the ED and was found to have elevated D-dimer, mildly elevated BNP, and a CTA chest demonstrates a pulmonary embolism.  She was started on a heparin drip and admitted to medicine for further management.    Review of Systems:  Review of Systems   Constitutional:  Negative for chills, fatigue and fever.   HENT:  Negative for ear pain and sore throat.    Eyes:  Negative for pain and visual disturbance.   Respiratory:  Positive for cough and shortness of breath. Negative for chest tightness and wheezing.    Cardiovascular:  Negative for chest pain, palpitations and leg swelling.   Gastrointestinal:  Negative for abdominal pain and vomiting.   Genitourinary:  Negative for dysuria and hematuria.   Musculoskeletal:  Negative for arthralgias and back pain.   Skin:  Negative for color change and rash.   Neurological:  Negative for seizures, syncope, light-headedness and headaches.   Psychiatric/Behavioral:  Negative for confusion.    All other systems reviewed and are negative.      Past Medical and Surgical History:   Past Medical History:   Diagnosis Date    Anxiety     Hepatitis C 04/01/2017    screening Negative    High cholesterol     Hip pain     History of low potassium     Lower extremity edema     Obesity     Osteopenia     Overweight     Thigh pain        Past Surgical History:   Procedure Laterality Date    BLADDER SURGERY      CHOLECYSTECTOMY      COLOGUARD (HISTORICAL)  07/02/2018    COLONOSCOPY  01/01/2007    DXA PROCEDURE (HISTORICAL)  03/27/2014    MAMMO (HISTORICAL)  08/31/2016    REDUCTION MAMMAPLASTY      REPLACEMENT TOTAL KNEE Bilateral     TOTAL HIP ARTHROPLASTY  Left 01/01/2015    TOTAL KNEE ARTHROPLASTY Left        Meds/Allergies:  Prior to Admission medications    Medication Sig Start Date End Date Taking? Authorizing Provider   carbidopa-levodopa (SINEMET)  mg per tablet Take one tablet 3 times a day and then take 1.5 tablets at bedtime.  Patient taking differently: 2.5 tablets 4 (four) times a day Take one tablet 3 times a day and then take 1.5 tablets at bedtime. 1/25/21  Yes Andres Andrade MD   ezetimibe (ZETIA) 10 mg tablet Take by mouth Daily 4/1/19  Yes Historical Provider, MD   potassium chloride (MICRO-K) 10 MEQ CR capsule Take 1 capsule by mouth Daily 4/1/19  Yes Historical Provider, MD   triamterene-hydrochlorothiazide (DYAZIDE) 37.5-25 mg per capsule Take 1 capsule by mouth Daily 4/1/19  Yes Historical Provider, MD   diclofenac sodium (VOLTAREN) 1 % APPLY 2 GM TO AFFECTED AREA THREE TIMES A DAY AS NEEDED. 4/1/20   Historical Provider, MD   Diclofenac Sodium (VOLTAREN) 1 % Apply 2 g topically 4 (four) times a day 8/15/23   Gloria Christianson MD   meloxicam (Mobic) 15 mg tablet Take 1 tablet (15 mg total) by mouth daily 8/15/23   Gloria Christianson MD     I have reviewed home medications with patient personally.    Allergies: No Known Allergies    Social History:  Marital Status: /Civil Union   Occupation:   Patient Pre-hospital Living Situation: Home  Patient Pre-hospital Level of Mobility: walks  Patient Pre-hospital Diet Restrictions:   Substance Use History:   Social History     Substance and Sexual Activity   Alcohol Use Yes    Comment: social, Occasional      Social History     Tobacco Use   Smoking Status Never   Smokeless Tobacco Never     Social History     Substance and Sexual Activity   Drug Use Never       Family History:  Family History   Problem Relation Age of Onset    Glaucoma Mother     Hypertension Mother     Glaucoma Father     Leukemia Father     Hypertension Father        Physical Exam:     Vitals:   Blood Pressure: 158/70  "(01/11/24 1445)  Pulse: 84 (01/11/24 1445)  Temperature: 98.5 °F (36.9 °C) (01/11/24 1213)  Temp Source: Oral (01/11/24 1213)  Respirations: 20 (01/11/24 1445)  Height: 5' 4\" (162.6 cm) (01/11/24 1005)  Weight - Scale: 79 kg (174 lb 2.6 oz) (01/11/24 1408)  SpO2: 97 % (01/11/24 1445)    Physical Exam     Additional Data:     Lab Results:  Results from last 7 days   Lab Units 01/11/24  1013   WBC Thousand/uL 5.89   HEMOGLOBIN g/dL 12.8   HEMATOCRIT % 39.1   PLATELETS Thousands/uL 260   NEUTROS PCT % 83*   LYMPHS PCT % 7*   MONOS PCT % 8   EOS PCT % 1     Results from last 7 days   Lab Units 01/11/24  1013   SODIUM mmol/L 133*   POTASSIUM mmol/L 3.0*   CHLORIDE mmol/L 97   CO2 mmol/L 20*   BUN mg/dL 10   CREATININE mg/dL 0.82   ANION GAP mmol/L 16   CALCIUM mg/dL 9.4   ALBUMIN g/dL 4.2   TOTAL BILIRUBIN mg/dL 1.37*   ALK PHOS U/L 78   ALT U/L <3*   AST U/L 5*   GLUCOSE RANDOM mg/dL 137     Results from last 7 days   Lab Units 01/11/24  1311   INR  1.02                   Lines/Drains:  Invasive Devices       Peripheral Intravenous Line  Duration             Peripheral IV 01/11/24 Left Antecubital <1 day    Peripheral IV 01/11/24 Right Antecubital <1 day                        Imaging: Reviewed radiology reports from this admission including: chest CT scan  CTA ED chest PE study   Final Result by Tawnya Latif MD (01/11 1435)      Diffuse bilateral pulmonary emboli within the bilateral main pulmonary arteries with extension into the segmental and subsegmental pulmonary arteries.      The calculated ratio of right ventricular to left ventricular diameter (RV/LV ratio) is 1.3.      There is a critical finding of acute PE with RV/LV ratio >0.9. Based on PERT algorithm recommendations:    \"  Order STAT biomarkers including troponin, NT-BNP or BNP    \"  Calculate PESI score:   o  If PESI score falls in I-III, with negative biomarkers, please order a PERT priority ECHO.   o  If PESI score falls in IV-V or with positive " biomarkers, please order STAT ECHO and alert the campus PERT via PAC at [phone].      I personally discussed this study with ALICIA MCDONALD on 1/11/2024 2:34 PM.            Workstation performed: [id number]         XR chest 2 views   ED Interpretation by Alicia Mcdonald DO (01/11 8681)   No acute abnormality          EKG and Other Studies Reviewed on Admission:   EKG: NSR. HR 82.    ** Please Note: This note has been constructed using a voice recognition system. **

## 2024-01-12 ENCOUNTER — HOSPITAL ENCOUNTER (INPATIENT)
Facility: HOSPITAL | Age: 79
LOS: 3 days | Discharge: HOME HEALTH | DRG: 640 | End: 2024-01-16
Attending: EMERGENCY MEDICINE | Admitting: INTERNAL MEDICINE
Payer: MEDICARE

## 2024-01-12 VITALS
BODY MASS INDEX: 29.71 KG/M2 | TEMPERATURE: 98.5 F | DIASTOLIC BLOOD PRESSURE: 58 MMHG | SYSTOLIC BLOOD PRESSURE: 119 MMHG | HEART RATE: 79 BPM | HEIGHT: 64 IN | WEIGHT: 174 LBS | OXYGEN SATURATION: 93 % | RESPIRATION RATE: 16 BRPM

## 2024-01-12 DIAGNOSIS — G20.A1 PARKINSON DISEASE: ICD-10-CM

## 2024-01-12 DIAGNOSIS — N39.0 UTI (URINARY TRACT INFECTION): ICD-10-CM

## 2024-01-12 DIAGNOSIS — R56.9 SEIZURE-LIKE ACTIVITY (HCC): Chronic | ICD-10-CM

## 2024-01-12 DIAGNOSIS — E87.1 HYPONATREMIA: ICD-10-CM

## 2024-01-12 DIAGNOSIS — R26.2 AMBULATORY DYSFUNCTION: Primary | ICD-10-CM

## 2024-01-12 DIAGNOSIS — E87.20 METABOLIC ACIDOSIS: ICD-10-CM

## 2024-01-12 DIAGNOSIS — R53.1 GENERALIZED WEAKNESS: ICD-10-CM

## 2024-01-12 LAB
ANION GAP SERPL CALCULATED.3IONS-SCNC: 11 MMOL/L
APTT PPP: 81 SECONDS (ref 23–37)
BASOPHILS # BLD AUTO: 0.02 THOUSANDS/ÂΜL (ref 0–0.1)
BASOPHILS # BLD AUTO: 0.03 THOUSANDS/ÂΜL (ref 0–0.1)
BASOPHILS NFR BLD AUTO: 0 % (ref 0–1)
BASOPHILS NFR BLD AUTO: 1 % (ref 0–1)
BUN SERPL-MCNC: 12 MG/DL (ref 5–25)
CALCIUM SERPL-MCNC: 8.9 MG/DL (ref 8.4–10.2)
CHLORIDE SERPL-SCNC: 100 MMOL/L (ref 96–108)
CO2 SERPL-SCNC: 20 MMOL/L (ref 21–32)
CREAT SERPL-MCNC: 0.73 MG/DL (ref 0.6–1.3)
EOSINOPHIL # BLD AUTO: 0.02 THOUSAND/ÂΜL (ref 0–0.61)
EOSINOPHIL # BLD AUTO: 0.11 THOUSAND/ÂΜL (ref 0–0.61)
EOSINOPHIL NFR BLD AUTO: 0 % (ref 0–6)
EOSINOPHIL NFR BLD AUTO: 2 % (ref 0–6)
ERYTHROCYTE [DISTWIDTH] IN BLOOD BY AUTOMATED COUNT: 13.8 % (ref 11.6–15.1)
ERYTHROCYTE [DISTWIDTH] IN BLOOD BY AUTOMATED COUNT: 14.1 % (ref 11.6–15.1)
GFR SERPL CREATININE-BSD FRML MDRD: 80 ML/MIN/1.73SQ M
GLUCOSE SERPL-MCNC: 91 MG/DL (ref 65–140)
HCT VFR BLD AUTO: 35.2 % (ref 34.8–46.1)
HCT VFR BLD AUTO: 37.7 % (ref 34.8–46.1)
HGB BLD-MCNC: 11.7 G/DL (ref 11.5–15.4)
HGB BLD-MCNC: 12.4 G/DL (ref 11.5–15.4)
HOLD SPECIMEN: NORMAL
IMM GRANULOCYTES # BLD AUTO: 0.06 THOUSAND/UL (ref 0–0.2)
IMM GRANULOCYTES # BLD AUTO: 0.07 THOUSAND/UL (ref 0–0.2)
IMM GRANULOCYTES NFR BLD AUTO: 1 % (ref 0–2)
IMM GRANULOCYTES NFR BLD AUTO: 1 % (ref 0–2)
LYMPHOCYTES # BLD AUTO: 0.59 THOUSANDS/ÂΜL (ref 0.6–4.47)
LYMPHOCYTES # BLD AUTO: 0.74 THOUSANDS/ÂΜL (ref 0.6–4.47)
LYMPHOCYTES NFR BLD AUTO: 12 % (ref 14–44)
LYMPHOCYTES NFR BLD AUTO: 7 % (ref 14–44)
MCH RBC QN AUTO: 27.6 PG (ref 26.8–34.3)
MCH RBC QN AUTO: 27.9 PG (ref 26.8–34.3)
MCHC RBC AUTO-ENTMCNC: 32.9 G/DL (ref 31.4–37.4)
MCHC RBC AUTO-ENTMCNC: 33.2 G/DL (ref 31.4–37.4)
MCV RBC AUTO: 84 FL (ref 82–98)
MCV RBC AUTO: 84 FL (ref 82–98)
MONOCYTES # BLD AUTO: 0.6 THOUSAND/ÂΜL (ref 0.17–1.22)
MONOCYTES # BLD AUTO: 0.65 THOUSAND/ÂΜL (ref 0.17–1.22)
MONOCYTES NFR BLD AUTO: 7 % (ref 4–12)
MONOCYTES NFR BLD AUTO: 9 % (ref 4–12)
NEUTROPHILS # BLD AUTO: 4.85 THOUSANDS/ÂΜL (ref 1.85–7.62)
NEUTROPHILS # BLD AUTO: 7.53 THOUSANDS/ÂΜL (ref 1.85–7.62)
NEUTS SEG NFR BLD AUTO: 75 % (ref 43–75)
NEUTS SEG NFR BLD AUTO: 85 % (ref 43–75)
NRBC BLD AUTO-RTO: 0 /100 WBCS
NRBC BLD AUTO-RTO: 0 /100 WBCS
OSMOLALITY UR/SERPL-RTO: 282 MMOL/KG (ref 282–298)
PLATELET # BLD AUTO: 201 THOUSANDS/UL (ref 149–390)
PLATELET # BLD AUTO: 216 THOUSANDS/UL (ref 149–390)
PMV BLD AUTO: 10.8 FL (ref 8.9–12.7)
PMV BLD AUTO: 11.3 FL (ref 8.9–12.7)
POTASSIUM SERPL-SCNC: 3.4 MMOL/L (ref 3.5–5.3)
RBC # BLD AUTO: 4.19 MILLION/UL (ref 3.81–5.12)
RBC # BLD AUTO: 4.49 MILLION/UL (ref 3.81–5.12)
SODIUM SERPL-SCNC: 131 MMOL/L (ref 135–147)
WBC # BLD AUTO: 6.39 THOUSAND/UL (ref 4.31–10.16)
WBC # BLD AUTO: 8.88 THOUSAND/UL (ref 4.31–10.16)

## 2024-01-12 PROCEDURE — 80053 COMPREHEN METABOLIC PANEL: CPT

## 2024-01-12 PROCEDURE — 83930 ASSAY OF BLOOD OSMOLALITY: CPT

## 2024-01-12 PROCEDURE — 36415 COLL VENOUS BLD VENIPUNCTURE: CPT | Performed by: INTERNAL MEDICINE

## 2024-01-12 PROCEDURE — 99232 SBSQ HOSP IP/OBS MODERATE 35: CPT | Performed by: INTERNAL MEDICINE

## 2024-01-12 PROCEDURE — 83735 ASSAY OF MAGNESIUM: CPT

## 2024-01-12 PROCEDURE — 81001 URINALYSIS AUTO W/SCOPE: CPT

## 2024-01-12 PROCEDURE — 87086 URINE CULTURE/COLONY COUNT: CPT

## 2024-01-12 PROCEDURE — 85025 COMPLETE CBC W/AUTO DIFF WBC: CPT | Performed by: INTERNAL MEDICINE

## 2024-01-12 PROCEDURE — 4A10X4Z MONITORING OF CENTRAL NERVOUS ELECTRICAL ACTIVITY, EXTERNAL APPROACH: ICD-10-PCS | Performed by: INTERNAL MEDICINE

## 2024-01-12 PROCEDURE — 85025 COMPLETE CBC W/AUTO DIFF WBC: CPT

## 2024-01-12 PROCEDURE — 99239 HOSP IP/OBS DSCHRG MGMT >30: CPT | Performed by: INTERNAL MEDICINE

## 2024-01-12 PROCEDURE — 93970 EXTREMITY STUDY: CPT | Performed by: SURGERY

## 2024-01-12 PROCEDURE — 99285 EMERGENCY DEPT VISIT HI MDM: CPT | Performed by: EMERGENCY MEDICINE

## 2024-01-12 PROCEDURE — 85730 THROMBOPLASTIN TIME PARTIAL: CPT | Performed by: INTERNAL MEDICINE

## 2024-01-12 PROCEDURE — 80048 BASIC METABOLIC PNL TOTAL CA: CPT | Performed by: INTERNAL MEDICINE

## 2024-01-12 PROCEDURE — 99285 EMERGENCY DEPT VISIT HI MDM: CPT

## 2024-01-12 RX ORDER — POTASSIUM CHLORIDE 20 MEQ/1
20 TABLET, EXTENDED RELEASE ORAL ONCE
Status: COMPLETED | OUTPATIENT
Start: 2024-01-12 | End: 2024-01-12

## 2024-01-12 RX ADMIN — CARBIDOPA AND LEVODOPA 2.5 TABLET: 25; 100 TABLET ORAL at 10:12

## 2024-01-12 RX ADMIN — POTASSIUM CHLORIDE 20 MEQ: 1500 TABLET, EXTENDED RELEASE ORAL at 09:36

## 2024-01-12 RX ADMIN — CARBIDOPA AND LEVODOPA 2.5 TABLET: 25; 100 TABLET ORAL at 15:07

## 2024-01-12 RX ADMIN — APIXABAN 10 MG: 5 TABLET, FILM COATED ORAL at 16:23

## 2024-01-12 RX ADMIN — HEPARIN SODIUM 18 UNITS/KG/HR: 10000 INJECTION, SOLUTION INTRAVENOUS at 09:43

## 2024-01-12 RX ADMIN — TRIAMTERENE AND HYDROCHLOROTHIAZIDE 1 TABLET: 37.5; 25 TABLET ORAL at 10:10

## 2024-01-12 NOTE — DISCHARGE INSTR - AVS FIRST PAGE
Dear Kamilla Pat,     It was our pleasure to care for you here at [facility].  It is our hope that we were always able to exceed the expected standards for your care during your stay.  You were hospitalized due to pulmonary embolism.  You were cared for in the ED by Jonathan Jenkins MD under the service of Onesimo Simon MD with the [facility] who covers for your primary care physician (PCP), Olivia Blackwood MD, while you were hospitalized.  If you have any questions or concerns related to this hospitalization, you may contact us at .  For follow up as well as any medication refills, we recommend that you follow up with your primary care physician.  A registered nurse will reach out to you by phone within a few days after your discharge to answer any additional questions that you may have after going home.  However, at this time we provide for you here, the most important instructions / recommendations at discharge:     Notable Medication Adjustments -   Begin taking Eliquis 10mg (2 tablets) twice per day for 7 days, thereafter take 5 mg (1 tablet) twice per day.  Continue with a 30-day supply, however you will need to follow-up with your primary care provider for more.  You will need to be on Eliquis for minimum of 6 months  Stop taking Mobic while on Eliquis  Testing Required after Discharge -   You will require hypercoagulability studies and age appropriate cancer screening  You will require repeat echocardiogram in 3 to 6 months  ** Please contact your PCP to request testing orders for any of the testing recommended here **  Important follow up information -   Please follow-up with your primary care provider within the next week  Other Instructions -   Please return to the hospital if you have new or worsening symptoms including shortness of breath, loss of consciousness, pain in your legs, chest pain,  lightheadedness/dizziness  Please review this entire after visit summary as additional general instructions including medication list, appointments, activity, diet, any pertinent wound care, and other additional recommendations from your care team that may be provided for you.      Sincerely,     Jonathan Jenkins MD

## 2024-01-12 NOTE — PROGRESS NOTES
[facility]  Progress Note  Name: Kamilla Pat I  MRN: [id number]  Unit/Bed#: ED-17 I Date of Admission: 1/11/2024   Date of Service: 1/12/2024 I Hospital Day: 1    Assessment/Plan   * Multiple subsegmental pulmonary emboli without acute cor pulmonale (HCC)  Assessment & Plan  COVID-19 treated with Paxlovid December 2023  Testing negative for Covid-19  Presents with dyspnea on exertion, continued dry cough, no hemoptysis.  D-dimer 9,   Troponins normal  ECG normal  CTA chest found multiple bilateral subsegmental pulmonary embolism  Started on heparin infusion  Remains hemodynamically stable at this time, not requiring any oxygen  Echo: 65% EF with mild right ventricular dilation    Plan:  Continue heparin infusion, may switch to DOAC pending pulmonology recommendations  Monitor vital signs and oxygenation status  Pulmonary consulted, appreciate recs    Pure hypercholesterolemia  Assessment & Plan  Continue Zetia 10 mg daily    Parkinson disease  Assessment & Plan  Continue Sinemet home regimen         VTE Pharmacologic Prophylaxis: VTE Score: 6 High Risk (Score >/= 5) - Pharmacological DVT Prophylaxis Ordered: heparin drip. Sequential Compression Devices Ordered.    Patient Centered Rounds: I performed bedside rounds with nursing staff today.  Discussions with Specialists or Other Care Team Provider: Pulmonology    Education and Discussions with Family / Patient: Patient declined call to .     Current Length of Stay: 1 day(s)  Current Patient Status: Inpatient   Discharge Plan: Anticipate discharge in 24-48 hrs to home.    Code Status: Level 1 - Full Code    Subjective:   Patient seen at bedside today.  No acute events overnight.  Patient states that she is feeling well.  Patient denies any fevers, chills, headaches, chest pain, shortness of breath, abdominal pain, nausea, vomiting, constipation, diarrhea.  The dizziness he felt which causes [place] is no longer  present.  Patient has no acute or significant concerns or complaints.  Patient is doing well overall.      Objective:     Vitals:   Temp (24hrs), Av.5 °F (36.9 °C), Min:98.5 °F (36.9 °C), Max:98.5 °F (36.9 °C)    Temp:  [98.5 °F (36.9 °C)] 98.5 °F (36.9 °C)  HR:  [72-91] 74  Resp:  [18-22] 18  BP: (109-158)/(53-82) 132/64  SpO2:  [93 %-98 %] 93 %  Body mass index is 29.87 kg/m².     Input and Output Summary (last 24 hours):     Intake/Output Summary (Last 24 hours) at 2024 0841  Last data filed at 2024 0814  Gross per 24 hour   Intake --   Output 59 ml   Net -59 ml       Physical Exam:   Physical Exam  Constitutional:       General: She is not in acute distress.     Appearance: She is obese. She is not toxic-appearing or diaphoretic.   HENT:      Head: Normocephalic and atraumatic.      Mouth/Throat:      Mouth: Mucous membranes are moist.   Eyes:      General: No scleral icterus.  Cardiovascular:      Rate and Rhythm: Normal rate and regular rhythm.      Pulses: Normal pulses.      Heart sounds: Normal heart sounds.   Pulmonary:      Effort: Pulmonary effort is normal. No respiratory distress.      Breath sounds: Normal breath sounds.   Abdominal:      Palpations: Abdomen is soft. There is no mass.      Tenderness: There is no abdominal tenderness. There is no guarding.   Skin:     General: Skin is warm and dry.      Capillary Refill: Capillary refill takes less than 2 seconds.   Neurological:      General: No focal deficit present.      Mental Status: She is alert and oriented to person, place, and time.         Additional Data:     Labs:  Results from last 7 days   Lab Units 24  0333   WBC Thousand/uL 6.39   HEMOGLOBIN g/dL 11.7   HEMATOCRIT % 35.2   PLATELETS Thousands/uL 216   NEUTROS PCT % 75   LYMPHS PCT % 12*   MONOS PCT % 9   EOS PCT % 2     Results from last 7 days   Lab Units 24  0333 24  1013   SODIUM mmol/L 131* 133*   POTASSIUM mmol/L 3.4* 3.0*   CHLORIDE mmol/L 100 97  "  CO2 mmol/L 20* 20*   BUN mg/dL 12 10   CREATININE mg/dL 0.73 0.82   ANION GAP mmol/L 11 16   CALCIUM mg/dL 8.9 9.4   ALBUMIN g/dL  --  4.2   TOTAL BILIRUBIN mg/dL  --  1.37*   ALK PHOS U/L  --  78   ALT U/L  --  <3*   AST U/L  --  5*   GLUCOSE RANDOM mg/dL 91 137     Results from last 7 days   Lab Units 01/11/24  2046   INR  1.07                   Lines/Drains:  Invasive Devices       Peripheral Intravenous Line  Duration             Peripheral IV 01/11/24 Left Antecubital <1 day    Peripheral IV 01/11/24 Right Antecubital <1 day                      CTA ED chest PE study   Final Result by Tawnya Latif MD (01/11 2303)      Diffuse bilateral pulmonary emboli within the bilateral main pulmonary arteries with extension into the segmental and subsegmental pulmonary arteries.      The calculated ratio of right ventricular to left ventricular diameter (RV/LV ratio) is 1.3.      There is a critical finding of acute PE with RV/LV ratio >0.9. Based on PERT algorithm recommendations:    \"  Order STAT biomarkers including troponin, NT-BNP or BNP    \"  Calculate PESI score:   o  If PESI score falls in I-III, with negative biomarkers, please order a PERT priority ECHO.   o  If PESI score falls in IV-V or with positive biomarkers, please order STAT ECHO and alert the [facility] PERT via PAC at [phone].      I personally discussed this study with LEENA MCDONALD on 1/11/2024 2:34 PM.            Workstation performed: [id number]         XR chest 2 views   ED Interpretation by Leena Mcdonald DO (01/11 0390)   No acute abnormality      Final Result by Rehan Finney MD (01/11 6320)      No acute cardiopulmonary disease.                  Resident: MEJIA MCKEON I, the attending radiologist, have reviewed the images and agree with the final report above.      Workstation performed: [id number]          VAS VENOUS DUPLEX - LOWER LIMB BILATERAL    (Results Pending)         Imaging: Reviewed radiology reports from this admission " including: above    Recent Cultures (last 7 days):         Last 24 Hours Medication List:   Current Facility-Administered Medications   Medication Dose Route Frequency Provider Last Rate    carbidopa-levodopa  2.5 tablet Oral 4x Daily Anthony Willis MD      ezetimibe  10 mg Oral HS Anthony Willis MD      heparin (porcine)  3-30 Units/kg/hr (Order-Specific) Intravenous Titrated Anthony Willis MD 18 Units/kg/hr (01/11/24 1423)    heparin (porcine)  3,000 Units Intravenous Q6H PRN Anthony Willis MD      heparin (porcine)  6,000 Units Intravenous Q6H PRN Anthony Willis MD      potassium chloride  20 mEq Oral Once Jonathan Jenkins MD      triamterene-hydrochlorothiazide  1 tablet Oral Daily Anthony Willis MD          Today, Patient Was Seen By: Jonathan Jenkins MD    **Please Note: This note may have been constructed using a voice recognition system.**

## 2024-01-12 NOTE — CASE MANAGEMENT
Case Management Progress Note    Patient name Kamilla Pat  Location ED-17/ED-17 MRN [id number]  : 1945 Date 2024       LOS (days): 1  Geometric Mean LOS (GMLOS) (days): 2.5  Days to GMLOS:1.7        OBJECTIVE:        Current admission status: Inpatient  Preferred Pharmacy:   [facility]/pharmacy #[id number] [place], [place] - [address] [id number] [facility] [address]  Phone: [phone] Fax: [phone]    Primary Care Provider: Olviia Blackwood MD    Primary Insurance: MEDICARE  Secondary Insurance: [facility]    PROGRESS NOTE:    SLIM team Resident Jonathan requested Anticoagulant price check. CM call [facility] and price checked Eliquis for $500, Xarelto for $300 and Coumadin for $ 20.   Dabigatran 75 mg is priced at $30- it is on back order non available at [facility]    MD made awre of the above.

## 2024-01-12 NOTE — DISCHARGE SUMMARY
[facility]  Discharge- Kamilla Pat 1945, 78 y.o. female MRN: [id number]  Unit/Bed#: ED-17 Encounter: [id number]  Primary Care Provider: Olivia Blackwood MD   Date and time admitted to hospital: 1/11/2024 12:16 PM    * Multiple subsegmental pulmonary emboli without acute cor pulmonale (HCC)  Assessment & Plan  COVID-19 treated with Paxlovid December 2023  Testing negative for Covid-19  Presents with dyspnea on exertion, continued dry cough, no hemoptysis.  D-dimer 9,   Troponins normal  ECG normal  CTA chest found multiple bilateral subsegmental pulmonary embolism  Started on heparin infusion  Remains hemodynamically stable at this time, not requiring any oxygen  Echo: 65% EF with mild right ventricular dilation    Plan:  PE protocol Eliquis  Monitor vital signs and oxygenation status  Pulmonary consulted, appreciate recs  Follow-up echocardiogram 3 to 6 months    Pure hypercholesterolemia  Assessment & Plan  Continue Zetia 10 mg daily    Parkinson disease  Assessment & Plan  Continue Sinemet home regimen      Medical Problems       Resolved Problems  Date Reviewed: 1/12/2024   None       Discharging Resident: Jonathan Jenkins MD  Discharging Attending: Onesimo Simon MD  PCP: Olivia Blackwood MD  Admission Date:   Admission Orders (From admission, onward)       Ordered        01/11/24 1450  INPATIENT ADMISSION  Once                          Discharge Date: 01/12/24    Consultations During Hospital Stay:  Pulmonology    Procedures Performed:   None    Significant Findings / Test Results:   None    Incidental Findings:   None     Test Results Pending at Discharge (will require follow up):  None     Outpatient Tests Requested:  Repeat echocardiogram in 3 to 6 months  Hypercoagulability studies  Age-appropriate cancer screening    Complications:  None    Reason for Admission: Shortness of breath on exertion and dry cough    Hospital Course:   Kamilla Pat is a 78 y.o.  female patient who originally presented to the hospital on 1/11/2024 due to shortness of breath on exertion and dry cough.  Patient was found to have an elevated D-dimer and CT angiogram showed pulmonary embolism.  Patient was started on a heparin drip and echocardiogram was collected demonstrating mild dilation of the right ventricle.  Thrombolysis was deemed not necessary, and patient was transitioned from heparin drip to apixaban.  Pulmonology was consulted for assistance in these plans.  Patient passed ambulatory O2 eval without need for airway exogenous oxygen.  Patient will need repeat echocardiogram in 3 to 6 months to evaluate for right heart function restoration.  Patient was advised to remain on Eliquis for minimum of 6 months outpatient.  She was advised that she will need hypercoagulability studies in addition to age-appropriate cancer screening.    The patient expressed understanding of the above plan and all questions were answered.  The patient was eager to discharge home.    Please see above list of diagnoses and related plan for additional information.     Condition at Discharge: good    Discharge Day Visit / Exam:   Subjective:  Patient seen at bedside today.  No acute events overnight.  Patient states that she is feeling better compared to yesterday.  Denies any shortness of breath, chest pain, lightheadedness/dizziness.  Patient denies any fevers, chills, headaches, abdominal pain, nausea, vomiting.  Patient has no acute or significant concerns or complaints.  Patient is doing well overall.  Asks when she can go home.  She was advised on the price of Eliquis and offered alternatives, but preferred Eliquis.    Vitals: Blood Pressure: 129/56 (Simultaneous filing. User may not have seen previous data.) (01/12/24 1430)  Pulse: 84 (Simultaneous filing. User may not have seen previous data.) (01/12/24 1430)  Temperature: 98.5 °F (36.9 °C) (01/11/24 1213)  Temp Source: Oral (01/11/24  "1213)  Respirations: 16 (01/12/24 1430)  Height: 5' 4\" (162.6 cm) (01/11/24 1515)  Weight - Scale: 78.9 kg (174 lb) (01/11/24 1515)  SpO2: 95 % (Simultaneous filing. User may not have seen previous data.) (01/12/24 1430)  Exam:   Physical Exam  Constitutional:       General: She is not in acute distress.     Appearance: She is obese. She is not toxic-appearing or diaphoretic.   HENT:      Head: Normocephalic and atraumatic.      Mouth/Throat:      Mouth: Mucous membranes are moist.   Eyes:      General: No scleral icterus.  Cardiovascular:      Rate and Rhythm: Normal rate and regular rhythm.      Pulses: Normal pulses.      Heart sounds: Normal heart sounds.   Pulmonary:      Effort: Pulmonary effort is normal. No respiratory distress.      Breath sounds: Normal breath sounds.   Abdominal:      Palpations: Abdomen is soft. There is no mass.      Tenderness: There is no abdominal tenderness. There is no guarding.   Skin:     General: Skin is warm and dry.      Capillary Refill: Capillary refill takes less than 2 seconds.   Neurological:      General: No focal deficit present.      Mental Status: She is alert and oriented to person, place, and time.         Discussion with Family: Updated  () at bedside.    Discharge instructions/Information to patient and family:   See after visit summary for information provided to patient and family.      Provisions for Follow-Up Care:  See after visit summary for information related to follow-up care and any pertinent home health orders.       Disposition:   Home    Planned Readmission: No    Discharge Medications:  See after visit summary for reconciled discharge medications provided to patient and/or family.      **Please Note: This note may have been constructed using a voice recognition system**      "

## 2024-01-12 NOTE — Clinical Note
Case was discussed with carlee and the patient's admission status was agreed to be Admission Status: inpatient status to the service of Dr. bejarano .

## 2024-01-12 NOTE — ASSESSMENT & PLAN NOTE
COVID-19 treated with Paxlovid December 2023  Testing negative for Covid-19  Presents with dyspnea on exertion, continued dry cough, no hemoptysis.  D-dimer 9,   Troponins normal  ECG normal  CTA chest found multiple bilateral subsegmental pulmonary embolism  Started on heparin infusion  Remains hemodynamically stable at this time, not requiring any oxygen  Echo: 65% EF with mild right ventricular dilation    Plan:  PE protocol Eliquis  Monitor vital signs and oxygenation status  Pulmonary consulted, appreciate recs  Follow-up echocardiogram 3 to 6 months

## 2024-01-12 NOTE — PROGRESS NOTES
"Progress Note - Pulmonary   Kamilla Pat 78 y.o. female MRN: [id number]  Unit/Bed#: ED-17 Encounter: [id number]    Assessment/Plan:    1.  Acute pulmonary insufficiency likely multifaceted as listed below         -Patient remains on room air 95%, does not wear home O2        -Maintain saturations greater than 88%        -Pulmonary toileting deep breathing cough out of bed as tolerated incentive spirometry        -Will need home O2 evaluation    2.  Acute B/L likely provoked PE w/ RV strain        -B/L main pulmonary arteries extension into segmental and subsegmental arteries        -Possibly secondary to sedentary lifestyle        -PESI score- class II-low risk        -Echo-RV mildly dilated PA pressure 64 mmHg        -Given stability of vital signs and cardiac markers, thrombectomy is unlikely required however will need to ambulate patient and monitor closely vital signs and overall symptomatic SOB         -Not a candidate for lytics         -Will need repeat echo in 3 months         -Minimum 6-month NOAC    3.  Parkinson's disease        -Continue supportive care        -Will continue Sinemet        Chief Complaint:    \"I feel ok\"    Subjective:    Kamilla was comfortably sitting in her bed.  She reports that she is still having some shortness of breath.  Currently denying any fevers, chills, abscess, headaches, night sweats, pleuritic chest pain, or palpitations.    Objective:    Vitals: Blood pressure 127/69, pulse 78, temperature 98.5 °F (36.9 °C), temperature source Oral, resp. rate 16, height 5' 4\" (1.626 m), weight 78.9 kg (174 lb), SpO2 95%.,Body mass index is 29.87 kg/m².      Intake/Output Summary (Last 24 hours) at 1/12/2024 1107  Last data filed at 1/12/2024 0942  Gross per 24 hour   Intake 250 ml   Output 59 ml   Net 191 ml       Invasive Devices       Peripheral Intravenous Line  Duration             Peripheral IV 01/11/24 Right Antecubital 1 day    Peripheral IV 01/11/24 Left Antecubital <1 day " "                   Physical Exam:     Physical Exam  Constitutional:       General: She is not in acute distress.     Appearance: Normal appearance. She is normal weight. She is not ill-appearing.   HENT:      Head: Normocephalic and atraumatic.      Nose: Nose normal. No congestion or rhinorrhea.      Mouth/Throat:      Mouth: Mucous membranes are dry.      Pharynx: Oropharynx is clear. No oropharyngeal exudate or posterior oropharyngeal erythema.   Cardiovascular:      Rate and Rhythm: Normal rate and regular rhythm.      Pulses: Normal pulses.      Heart sounds: Normal heart sounds. No murmur heard.     No friction rub. No gallop.   Pulmonary:      Effort: Pulmonary effort is normal. No respiratory distress.      Breath sounds: Normal breath sounds. No stridor. No wheezing, rhonchi or rales.      Comments: Clear breath sounds  Chest:      Chest wall: No tenderness.   Abdominal:      General: Abdomen is flat. Bowel sounds are normal. There is no distension.      Palpations: Abdomen is soft. There is no mass.   Musculoskeletal:      Cervical back: Normal range of motion. No rigidity or tenderness.   Skin:     General: Skin is warm and dry.      Coloration: Skin is not jaundiced or pale.   Neurological:      General: No focal deficit present.      Mental Status: She is alert and oriented to person, place, and time. Mental status is at baseline.   Psychiatric:         Mood and Affect: Mood normal.         Behavior: Behavior normal.         Labs: I have personally reviewed pertinent lab results., ABG: No results found for: \"PHART\", \"TNJ2AVU\", \"PO2ART\", \"RRV6JNQ\", \"C2VYLWBR\", \"BEART\", \"SOURCE\", BNP: No results found for: \"BNP\", CBC:   Lab Results   Component Value Date    WBC 6.39 01/12/2024    HGB 11.7 01/12/2024    HCT 35.2 01/12/2024    MCV 84 01/12/2024     01/12/2024    RBC 4.19 01/12/2024    MCH 27.9 01/12/2024    MCHC 33.2 01/12/2024    RDW 13.8 01/12/2024    MPV 11.3 01/12/2024    NRBC 0 01/12/2024   , " CMP:   Lab Results   Component Value Date    SODIUM 131 (L) 01/12/2024    K 3.4 (L) 01/12/2024     01/12/2024    CO2 20 (L) 01/12/2024    BUN 12 01/12/2024    CREATININE 0.73 01/12/2024    CALCIUM 8.9 01/12/2024    EGFR 80 01/12/2024   , PT/INR:   Lab Results   Component Value Date    INR 1.07 01/11/2024         Imaging and other studies: I have personally reviewed pertinent films in PACS    Chest CT bilateral PEs

## 2024-01-13 PROBLEM — R26.2 AMBULATORY DYSFUNCTION: Status: ACTIVE | Noted: 2024-01-13

## 2024-01-13 PROBLEM — E87.1 HYPONATREMIA: Status: ACTIVE | Noted: 2024-01-13

## 2024-01-13 LAB
ALBUMIN SERPL BCP-MCNC: 4.2 G/DL (ref 3.5–5)
ALP SERPL-CCNC: 83 U/L (ref 34–104)
ALT SERPL W P-5'-P-CCNC: <3 U/L (ref 7–52)
ANION GAP SERPL CALCULATED.3IONS-SCNC: 10 MMOL/L
ANION GAP SERPL CALCULATED.3IONS-SCNC: 13 MMOL/L
ANION GAP SERPL CALCULATED.3IONS-SCNC: 9 MMOL/L
AST SERPL W P-5'-P-CCNC: 5 U/L (ref 13–39)
BACTERIA UR QL AUTO: ABNORMAL /HPF
BASE EX.OXY STD BLDV CALC-SCNC: 50.4 % (ref 60–80)
BASE EXCESS BLDV CALC-SCNC: -3.4 MMOL/L
BILIRUB SERPL-MCNC: 1.5 MG/DL (ref 0.2–1)
BILIRUB UR QL STRIP: ABNORMAL
BUN SERPL-MCNC: 14 MG/DL (ref 5–25)
BUN SERPL-MCNC: 14 MG/DL (ref 5–25)
BUN SERPL-MCNC: 16 MG/DL (ref 5–25)
CALCIUM SERPL-MCNC: 8.7 MG/DL (ref 8.4–10.2)
CALCIUM SERPL-MCNC: 8.9 MG/DL (ref 8.4–10.2)
CALCIUM SERPL-MCNC: 9.4 MG/DL (ref 8.4–10.2)
CHLORIDE SERPL-SCNC: 96 MMOL/L (ref 96–108)
CHLORIDE SERPL-SCNC: 97 MMOL/L (ref 96–108)
CHLORIDE SERPL-SCNC: 97 MMOL/L (ref 96–108)
CLARITY UR: ABNORMAL
CO2 SERPL-SCNC: 20 MMOL/L (ref 21–32)
CO2 SERPL-SCNC: 22 MMOL/L (ref 21–32)
CO2 SERPL-SCNC: 22 MMOL/L (ref 21–32)
COLOR UR: ABNORMAL
CREAT SERPL-MCNC: 0.73 MG/DL (ref 0.6–1.3)
CREAT SERPL-MCNC: 0.75 MG/DL (ref 0.6–1.3)
CREAT SERPL-MCNC: 0.96 MG/DL (ref 0.6–1.3)
GFR SERPL CREATININE-BSD FRML MDRD: 57 ML/MIN/1.73SQ M
GFR SERPL CREATININE-BSD FRML MDRD: 77 ML/MIN/1.73SQ M
GFR SERPL CREATININE-BSD FRML MDRD: 80 ML/MIN/1.73SQ M
GLUCOSE SERPL-MCNC: 105 MG/DL (ref 65–140)
GLUCOSE SERPL-MCNC: 90 MG/DL (ref 65–140)
GLUCOSE SERPL-MCNC: 90 MG/DL (ref 65–140)
GLUCOSE UR STRIP-MCNC: NEGATIVE MG/DL
HCO3 BLDV-SCNC: 21.1 MMOL/L (ref 24–30)
HGB UR QL STRIP.AUTO: NEGATIVE
HYALINE CASTS #/AREA URNS LPF: ABNORMAL /LPF
KETONES UR STRIP-MCNC: ABNORMAL MG/DL
LACTATE SERPL-SCNC: 1 MMOL/L (ref 0.5–2)
LEUKOCYTE ESTERASE UR QL STRIP: ABNORMAL
MAGNESIUM SERPL-MCNC: 1.8 MG/DL (ref 1.9–2.7)
NITRITE UR QL STRIP: POSITIVE
NON-SQ EPI CELLS URNS QL MICRO: ABNORMAL /HPF
O2 CT BLDV-SCNC: 9.3 ML/DL
PCO2 BLDV: 36.5 MM HG (ref 42–50)
PH BLDV: 7.38 [PH] (ref 7.3–7.4)
PH UR STRIP.AUTO: 7.5 [PH]
PO2 BLDV: 29.4 MM HG (ref 35–45)
POTASSIUM SERPL-SCNC: 3.5 MMOL/L (ref 3.5–5.3)
POTASSIUM SERPL-SCNC: 3.7 MMOL/L (ref 3.5–5.3)
POTASSIUM SERPL-SCNC: 4 MMOL/L (ref 3.5–5.3)
PROT SERPL-MCNC: 7.1 G/DL (ref 6.4–8.4)
PROT UR STRIP-MCNC: ABNORMAL MG/DL
RBC #/AREA URNS AUTO: ABNORMAL /HPF
SODIUM SERPL-SCNC: 128 MMOL/L (ref 135–147)
SODIUM SERPL-SCNC: 129 MMOL/L (ref 135–147)
SODIUM SERPL-SCNC: 129 MMOL/L (ref 135–147)
SP GR UR STRIP.AUTO: 1.01 (ref 1–1.03)
UROBILINOGEN UR QL STRIP.AUTO: 0.2 E.U./DL
WBC #/AREA URNS AUTO: ABNORMAL /HPF
WBC CLUMPS # UR AUTO: PRESENT /UL

## 2024-01-13 PROCEDURE — 36415 COLL VENOUS BLD VENIPUNCTURE: CPT

## 2024-01-13 PROCEDURE — 99222 1ST HOSP IP/OBS MODERATE 55: CPT | Performed by: INTERNAL MEDICINE

## 2024-01-13 PROCEDURE — 80048 BASIC METABOLIC PNL TOTAL CA: CPT | Performed by: INTERNAL MEDICINE

## 2024-01-13 PROCEDURE — 80048 BASIC METABOLIC PNL TOTAL CA: CPT

## 2024-01-13 PROCEDURE — 82805 BLOOD GASES W/O2 SATURATION: CPT

## 2024-01-13 PROCEDURE — 96365 THER/PROPH/DIAG IV INF INIT: CPT

## 2024-01-13 PROCEDURE — 83605 ASSAY OF LACTIC ACID: CPT

## 2024-01-13 RX ORDER — TRIAMTERENE AND HYDROCHLOROTHIAZIDE 37.5; 25 MG/1; MG/1
1 TABLET ORAL DAILY
Status: DISCONTINUED | OUTPATIENT
Start: 2024-01-13 | End: 2024-01-13

## 2024-01-13 RX ORDER — POTASSIUM CHLORIDE 20MEQ/15ML
10 LIQUID (ML) ORAL DAILY
Status: DISCONTINUED | OUTPATIENT
Start: 2024-01-13 | End: 2024-01-16 | Stop reason: HOSPADM

## 2024-01-13 RX ORDER — EZETIMIBE 10 MG/1
10 TABLET ORAL
Status: DISCONTINUED | OUTPATIENT
Start: 2024-01-13 | End: 2024-01-16 | Stop reason: HOSPADM

## 2024-01-13 RX ORDER — MAGNESIUM SULFATE HEPTAHYDRATE 40 MG/ML
2 INJECTION, SOLUTION INTRAVENOUS ONCE
Status: COMPLETED | OUTPATIENT
Start: 2024-01-13 | End: 2024-01-13

## 2024-01-13 RX ORDER — SODIUM CHLORIDE, SODIUM GLUCONATE, SODIUM ACETATE, POTASSIUM CHLORIDE, MAGNESIUM CHLORIDE, SODIUM PHOSPHATE, DIBASIC, AND POTASSIUM PHOSPHATE .53; .5; .37; .037; .03; .012; .00082 G/100ML; G/100ML; G/100ML; G/100ML; G/100ML; G/100ML; G/100ML
75 INJECTION, SOLUTION INTRAVENOUS CONTINUOUS
Status: DISCONTINUED | OUTPATIENT
Start: 2024-01-13 | End: 2024-01-14

## 2024-01-13 RX ORDER — MAGNESIUM SULFATE 1 G/100ML
1 INJECTION INTRAVENOUS ONCE
Status: COMPLETED | OUTPATIENT
Start: 2024-01-13 | End: 2024-01-13

## 2024-01-13 RX ORDER — ACETAMINOPHEN 325 MG/1
325 TABLET ORAL EVERY 6 HOURS PRN
COMMUNITY

## 2024-01-13 RX ADMIN — SODIUM CHLORIDE, SODIUM GLUCONATE, SODIUM ACETATE, POTASSIUM CHLORIDE, MAGNESIUM CHLORIDE, SODIUM PHOSPHATE, DIBASIC, AND POTASSIUM PHOSPHATE 75 ML/HR: .53; .5; .37; .037; .03; .012; .00082 INJECTION, SOLUTION INTRAVENOUS at 17:16

## 2024-01-13 RX ADMIN — CEFTRIAXONE SODIUM 1000 MG: 10 INJECTION, POWDER, FOR SOLUTION INTRAVENOUS at 03:02

## 2024-01-13 RX ADMIN — CARBIDOPA AND LEVODOPA 2.5 TABLET: 25; 100 TABLET ORAL at 20:22

## 2024-01-13 RX ADMIN — SODIUM CHLORIDE 1000 ML: 0.9 INJECTION, SOLUTION INTRAVENOUS at 00:59

## 2024-01-13 RX ADMIN — DICLOFENAC SODIUM TOPICAL GEL, 1% 2 G: 10 GEL TOPICAL at 08:36

## 2024-01-13 RX ADMIN — APIXABAN 10 MG: 5 TABLET, FILM COATED ORAL at 17:07

## 2024-01-13 RX ADMIN — CARBIDOPA AND LEVODOPA 2.5 TABLET: 25; 100 TABLET ORAL at 17:01

## 2024-01-13 RX ADMIN — SODIUM CHLORIDE, SODIUM GLUCONATE, SODIUM ACETATE, POTASSIUM CHLORIDE, MAGNESIUM CHLORIDE, SODIUM PHOSPHATE, DIBASIC, AND POTASSIUM PHOSPHATE 75 ML/HR: .53; .5; .37; .037; .03; .012; .00082 INJECTION, SOLUTION INTRAVENOUS at 10:01

## 2024-01-13 RX ADMIN — MAGNESIUM SULFATE HEPTAHYDRATE 1 G: 1 INJECTION, SOLUTION INTRAVENOUS at 00:59

## 2024-01-13 RX ADMIN — APIXABAN 10 MG: 5 TABLET, FILM COATED ORAL at 08:27

## 2024-01-13 RX ADMIN — POTASSIUM CHLORIDE 10 MEQ: 1.5 SOLUTION ORAL at 08:30

## 2024-01-13 RX ADMIN — TRIAMTERENE AND HYDROCHLOROTHIAZIDE 1 TABLET: 37.5; 25 TABLET ORAL at 08:29

## 2024-01-13 RX ADMIN — CARBIDOPA AND LEVODOPA 1 TABLET: 25; 100 TABLET ORAL at 08:34

## 2024-01-13 RX ADMIN — MAGNESIUM SULFATE HEPTAHYDRATE 2 G: 40 INJECTION, SOLUTION INTRAVENOUS at 07:34

## 2024-01-13 RX ADMIN — EZETIMIBE 10 MG: 10 TABLET ORAL at 20:22

## 2024-01-13 RX ADMIN — CARBIDOPA AND LEVODOPA 1 TABLET: 25; 100 TABLET ORAL at 13:30

## 2024-01-13 NOTE — ASSESSMENT & PLAN NOTE
Continue Sinemet home regimen: 1 tablet at 9:30 AM, 1 tablet at 1:30 PM, 1 tablet at 5:30 PM, and 1.5 tablets at 9:30 PM

## 2024-01-13 NOTE — ASSESSMENT & PLAN NOTE
Prior to discharge sodium was 131, but upon returning to the ED it was 129.  Patient is asymptomatic and has already received 1 L normal saline bolus.  Will recheck sodium to determine if additional correction is required    12 PM BMP

## 2024-01-13 NOTE — ASSESSMENT & PLAN NOTE
Upon discharge yesterday, patient felt extremely shaky and generally weak, she notes this is different from her typical parkinsonian shakiness.  When car arrived at home she simply could not get her legs to move so return to the emergency department.  No focal deficits concerning for a bleed given new anticoagulation.  Magnesium was slightly low at 1.8 which can contribute.  Urinalysis appears to indicate signs of a UTI and patient received a dose of Rocephin in the ED    Continue Rocephin  Follow-up on urine culture  IV mag 2 g  PT and OT

## 2024-01-13 NOTE — ASSESSMENT & PLAN NOTE
COVID-19 treated with Paxlovid December 2023  dyspnea on exertion, continued dry cough, no hemoptysis.  CTA chest found multiple bilateral subsegmental pulmonary embolism  Transitioned from heparin to Eliquis prior to discharge home on 1/12    Plan:  Continue Eliquis for 6 months  Follow-up echocardiogram 3 to 6 months

## 2024-01-13 NOTE — ED PROVIDER NOTES
History  Chief Complaint   Patient presents with    Weakness - Generalized     Pt was discharged about 30 minutes ago after being dx with an PE. Pt got home and was unable to stand. Reports increased weakness and shakiness.      77yo F with h/o parkinson's dz presenting for LE weakness. Since 12/19/2023 Pt has had worsening weakness and ambulatory dysfuncion following a COVID infection. Of note, whenever she gets COVID her legs become weak. Over time she has found it difficult to walk without assistance. She presented to the hospital 1 day ago for SOB and was found to have PE for which she was placed on eliquis. She was discharged approximately 30 minutes ago, however, she never made it home because of the profound weakness. Denies loss of appetitie, HA, vision changes, numbness, confusion, F/C, SOB, CP.      History provided by:  Patient      Prior to Admission Medications   Prescriptions Last Dose Informant Patient Reported? Taking?   Diclofenac Sodium (VOLTAREN) 1 %   No No   Sig: Apply 2 g topically 4 (four) times a day   apixaban (Eliquis) 5 mg   No No   Sig: Take 2 tablets (10 mg total) by mouth 2 (two) times a day for 7 days, THEN 1 tablet (5 mg total) 2 (two) times a day for 21 days.   carbidopa-levodopa (SINEMET)  mg per tablet   No No   Sig: Take 2.5 tablets by mouth 4 (four) times a day Take one tablet 3 times a day and then take 1.5 tablets at bedtime.   diclofenac sodium (VOLTAREN) 1 %   Yes No   Sig: APPLY 2 GM TO AFFECTED AREA THREE TIMES A DAY AS NEEDED.   ezetimibe (ZETIA) 10 mg tablet   Yes No   Sig: Take by mouth Daily   potassium chloride (MICRO-K) 10 MEQ CR capsule   Yes No   Sig: Take 1 capsule by mouth Daily   triamterene-hydrochlorothiazide (DYAZIDE) 37.5-25 mg per capsule   Yes No   Sig: Take 1 capsule by mouth Daily      Facility-Administered Medications: None       Past Medical History:   Diagnosis Date    Anxiety     Hepatitis C 04/01/2017    screening Negative    High cholesterol      Hip pain     History of low potassium     Lower extremity edema     Obesity     Osteopenia     Overweight     Thigh pain        Past Surgical History:   Procedure Laterality Date    BLADDER SURGERY      CHOLECYSTECTOMY      COLOGUARD (HISTORICAL)  07/02/2018    COLONOSCOPY  01/01/2007    DXA PROCEDURE (HISTORICAL)  03/27/2014    MAMMO (HISTORICAL)  08/31/2016    REDUCTION MAMMAPLASTY      REPLACEMENT TOTAL KNEE Bilateral     TOTAL HIP ARTHROPLASTY Left 01/01/2015    TOTAL KNEE ARTHROPLASTY Left        Family History   Problem Relation Age of Onset    Glaucoma Mother     Hypertension Mother     Glaucoma Father     Leukemia Father     Hypertension Father      I have reviewed and agree with the history as documented.    E-Cigarette/Vaping    E-Cigarette Use Never User      E-Cigarette/Vaping Substances    Nicotine No     THC No     CBD No     Flavoring No     Other No     Unknown No      Social History     Tobacco Use    Smoking status: Never    Smokeless tobacco: Never   Vaping Use    Vaping status: Never Used   Substance Use Topics    Alcohol use: Yes     Comment: social, Occasional     Drug use: Never        Review of Systems   Constitutional: Negative.    HENT: Negative.     Eyes: Negative.    Respiratory: Negative.     Cardiovascular: Negative.    Gastrointestinal: Negative.    Genitourinary: Negative.    Musculoskeletal:  Positive for gait problem.   Skin: Negative.    Neurological:  Positive for weakness. Negative for numbness.   Psychiatric/Behavioral: Negative.         Physical Exam  ED Triage Vitals [01/12/24 1921]   Temperature Pulse Respirations Blood Pressure SpO2   (!) 97.4 °F (36.3 °C) 84 20 123/58 99 %      Temp Source Heart Rate Source Patient Position - Orthostatic VS BP Location FiO2 (%)   Oral Monitor -- Right arm --      Pain Score       No Pain             Orthostatic Vital Signs  Vitals:    01/12/24 1921   BP: 123/58   Pulse: 84       Physical Exam  Constitutional:       General: She is not in  acute distress.     Appearance: Normal appearance.   HENT:      Head: Normocephalic and atraumatic.      Right Ear: External ear normal.      Left Ear: External ear normal.      Nose: Nose normal. No rhinorrhea.      Mouth/Throat:      Mouth: Mucous membranes are moist.      Pharynx: Oropharynx is clear.   Eyes:      Extraocular Movements: Extraocular movements intact.      Conjunctiva/sclera: Conjunctivae normal.      Pupils: Pupils are equal, round, and reactive to light.   Cardiovascular:      Rate and Rhythm: Normal rate and regular rhythm.      Pulses: Normal pulses.      Heart sounds: Normal heart sounds.   Pulmonary:      Effort: Pulmonary effort is normal.      Breath sounds: Normal breath sounds.   Abdominal:      General: Abdomen is flat.      Palpations: Abdomen is soft.   Skin:     General: Skin is warm and dry.      Capillary Refill: Capillary refill takes less than 2 seconds.   Neurological:      General: No focal deficit present.      Mental Status: She is alert and oriented to person, place, and time.      Cranial Nerves: No cranial nerve deficit.      Sensory: No sensory deficit.      Motor: No weakness.   Psychiatric:         Mood and Affect: Mood normal.         Behavior: Behavior normal.         ED Medications  Medications   ceftriaxone (ROCEPHIN) 1 g/50 mL in dextrose IVPB (1,000 mg Intravenous New Bag 1/13/24 0302)   magnesium sulfate IVPB (premix) SOLN 1 g (0 g Intravenous Stopped 1/13/24 0159)   sodium chloride 0.9 % bolus 1,000 mL (0 mL Intravenous Stopped 1/13/24 0159)       Diagnostic Studies  Results Reviewed       Procedure Component Value Units Date/Time    Urine Microscopic [[id number]]  (Abnormal) Collected: 01/12/24 2320    Lab Status: Final result Specimen: Urine, Clean Catch Updated: 01/13/24 0156     RBC, UA 4-10 /hpf      WBC, UA 20-30 /hpf      Epithelial Cells Innumerable /hpf      Bacteria, UA None Seen /hpf      Hyaline Casts, UA 10-25 /lpf      WBC Clumps Present    Urine  culture [[id number]] Collected: 01/12/24 2320    Lab Status: In process Specimen: Urine, Clean Catch Updated: 01/13/24 0156    UA w Reflex to Microscopic w Reflex to Culture [[id number]]  (Abnormal) Collected: 01/12/24 2320    Lab Status: Final result Specimen: Urine, Clean Catch Updated: 01/13/24 0142     Color, UA Dark Yellow     Clarity, UA Hazy     Specific Gravity, UA 1.015     pH, UA 7.5     Leukocytes, UA Small     Nitrite, UA Positive     Protein, UA 30 (1+) mg/dl      Glucose, UA Negative mg/dl      Ketones, UA 15 (1+) mg/dl      Urobilinogen, UA 0.2 E.U./dl      Bilirubin, UA Small     Occult Blood, UA Negative    Lactic acid, plasma (w/reflex if result > 2.0) [[id number]]  (Normal) Collected: 01/13/24 0045    Lab Status: Final result Specimen: Blood from Arm, Left Updated: 01/13/24 0118     LACTIC ACID 1.0 mmol/L     Narrative:      Result may be elevated if tourniquet was used during collection.    Blood gas, venous [[id number]]  (Abnormal) Collected: 01/13/24 0045    Lab Status: Final result Specimen: Blood from Arm, Left Updated: 01/13/24 0114     pH, Sami 7.380     pCO2, Sami 36.5 mm Hg      pO2, Sami 29.4 mm Hg      HCO3, Sami 21.1 mmol/L      Base Excess, Sami -3.4 mmol/L      O2 Content, Sami 9.3 ml/dL      O2 HGB, VENOUS 50.4 %     Comprehensive metabolic panel [[id number]]  (Abnormal) Collected: 01/12/24 2321    Lab Status: Final result Specimen: Blood from Arm, Right Updated: 01/13/24 0003     Sodium 129 mmol/L      Potassium 3.5 mmol/L      Chloride 96 mmol/L      CO2 20 mmol/L      ANION GAP 13 mmol/L      BUN 16 mg/dL      Creatinine 0.96 mg/dL      Glucose 105 mg/dL      Calcium 9.4 mg/dL      AST 5 U/L      ALT <3 U/L      Alkaline Phosphatase 83 U/L      Total Protein 7.1 g/dL      Albumin 4.2 g/dL      Total Bilirubin 1.50 mg/dL      eGFR 57 ml/min/1.73sq m     Narrative:      [employer] guidelines for Chronic Kidney Disease (CKD):     Stage 1 with normal or high GFR (GFR >  90 mL/min/1.73 square meters)    Stage 2 Mild CKD (GFR = 60-89 mL/min/1.73 square meters)    Stage 3A Moderate CKD (GFR = 45-59 mL/min/1.73 square meters)    Stage 3B Moderate CKD (GFR = 30-44 mL/min/1.73 square meters)    Stage 4 Severe CKD (GFR = 15-29 mL/min/1.73 square meters)    Stage 5 End Stage CKD (GFR <15 mL/min/1.73 square meters)  Note: GFR calculation is accurate only with a steady state creatinine    Magnesium [[id number]]  (Abnormal) Collected: 01/12/24 2321    Lab Status: Final result Specimen: Blood from Arm, Right Updated: 01/13/24 0003     Magnesium 1.8 mg/dL     CBC and differential [[id number]]  (Abnormal) Collected: 01/12/24 2321    Lab Status: Final result Specimen: Blood from Arm, Right Updated: 01/12/24 2329     WBC 8.88 Thousand/uL      RBC 4.49 Million/uL      Hemoglobin 12.4 g/dL      Hematocrit 37.7 %      MCV 84 fL      MCH 27.6 pg      MCHC 32.9 g/dL      RDW 14.1 %      MPV 10.8 fL      Platelets 201 Thousands/uL      nRBC 0 /100 WBCs      Neutrophils Relative 85 %      Immat GRANS % 1 %      Lymphocytes Relative 7 %      Monocytes Relative 7 %      Eosinophils Relative 0 %      Basophils Relative 0 %      Neutrophils Absolute 7.53 Thousands/µL      Immature Grans Absolute 0.07 Thousand/uL      Lymphocytes Absolute 0.59 Thousands/µL      Monocytes Absolute 0.65 Thousand/µL      Eosinophils Absolute 0.02 Thousand/µL      Basophils Absolute 0.02 Thousands/µL                    No orders to display         Procedures  Procedures      ED Course  ED Course as of 01/13/24 0312   Sat Jan 13, 2024   0024 Magnesium(!): 1.8   0024 CO2(!): 20   0024 Anion Gap: 13                             SBIRT 20yo+      Flowsheet Row Most Recent Value   Initial Alcohol Screen: US AUDIT-C     1. How often do you have a drink containing alcohol? 0 Filed at: 01/12/2024 1922   2. How many drinks containing alcohol do you have on a typical day you are drinking?  0 Filed at: 01/12/2024 1922   3a. Male UNDER 65: How  often do you have five or more drinks on one occasion? 0 Filed at: 01/12/2024 1922   3b. FEMALE Any Age, or MALE 65+: How often do you have 4 or more drinks on one occassion? 0 Filed at: 01/12/2024 1922   Audit-C Score 0 Filed at: 01/12/2024 1922   ELMO: How many times in the past year have you...    Used an illegal drug or used a prescription medication for non-medical reasons? Never Filed at: 01/12/2024 1922                  Medical Decision Making  Pt's presentation is likely 2/2 worsening of her Parkinson's. Pt found to have an unexplained compensated AG metabolic acidosis which I would recommend be evaluated further while inpatient. Additionally, Pt found to have worsening hyponatremia, and UTI. Overall, Pt would benefit from admission for ambulatory dysfunction and investigation of her laboratory abnormalities.     Amount and/or Complexity of Data Reviewed  Labs: ordered. Decision-making details documented in ED Course.    Risk  Prescription drug management.  Decision regarding hospitalization.          Disposition  Final diagnoses:   Ambulatory dysfunction   Generalized weakness   Metabolic acidosis   Hyponatremia   UTI (urinary tract infection)     Time reflects when diagnosis was documented in both MDM as applicable and the Disposition within this note       Time User Action Codes Description Comment    1/12/2024 11:11 PM Arun Allred [R26.2] Ambulatory dysfunction     1/13/2024  1:49 AM Arun Allred [R53.1] Generalized weakness     1/13/2024  1:50 AM Arun Allred [E87.20] Metabolic acidosis     1/13/2024  1:50 AM Arun Allred [E87.1] Hyponatremia     1/13/2024  2:09 AM Arun Allred [N39.0] UTI (urinary tract infection)           ED Disposition       ED Disposition   Admit    Condition   Stable    Date/Time   Sat Jan 13, 2024 0209    Comment   Case was discussed with Dr. Mc and the patient's admission status was agreed to be Admission Status: inpatient status to the  service of Dr. Mc .               Follow-up Information    None         Patient's Medications   Discharge Prescriptions    No medications on file     No discharge procedures on file.    PDMP Review       None             ED Provider  Attending physically available and evaluated Kamilla Pat. I managed the patient along with the ED Attending.    Electronically Signed by           Arun Allred MD  01/13/24 0724

## 2024-01-13 NOTE — H&P
[facility]  H&P  Name: Kamilla Pat 78 y.o. female I MRN: [id number]  Unit/Bed#: ED-10 I Date of Admission: 1/12/2024   Date of Service: 1/13/2024 I Hospital Day: 0      Assessment/Plan   * Ambulatory dysfunction  Assessment & Plan  Upon discharge yesterday, patient felt extremely shaky and generally weak, she notes this is different from her typical parkinsonian shakiness.  When car arrived at home she simply could not get her legs to move so return to the emergency department.  No focal deficits concerning for a bleed given new anticoagulation.  Magnesium was slightly low at 1.8 which can contribute.  Urinalysis appears to indicate signs of a UTI and patient received a dose of Rocephin in the ED    Continue Rocephin  Follow-up on urine culture  IV mag 2 g  PT and OT    Hyponatremia  Assessment & Plan  Prior to discharge sodium was 131, but upon returning to the ED it was 129.  Patient is asymptomatic and has already received 1 L normal saline bolus.  Will recheck sodium to determine if additional correction is required    12 PM BMP    Multiple subsegmental pulmonary emboli without acute cor pulmonale (HCC)  Assessment & Plan  COVID-19 treated with Paxlovid December 2023  dyspnea on exertion, continued dry cough, no hemoptysis.  CTA chest found multiple bilateral subsegmental pulmonary embolism  Transitioned from heparin to Eliquis prior to discharge home on 1/12    Plan:  Continue Eliquis for 6 months  Follow-up echocardiogram 3 to 6 months    Parkinson disease  Assessment & Plan  Continue Sinemet home regimen: 1 tablet at 9:30 AM, 1 tablet at 1:30 PM, 1 tablet at 5:30 PM, and 1.5 tablets at 9:30 PM    Pure hypercholesterolemia  Assessment & Plan  Continue home Zetia         VTE Pharmacologic Prophylaxis: VTE Score: 7 High Risk (Score >/= 5) - Pharmacological DVT Prophylaxis Ordered: apixaban (Eliquis). Sequential Compression Devices Ordered.  Code Status: Prior level 1  Discussion  with family: Patient declined call to .     Anticipated Length of Stay: Patient will be admitted on an inpatient basis with an anticipated length of stay of greater than 2 midnights secondary to ambulatory dysfunction and suspected UTI.    Chief Complaint: Generalized weakness inability to walk    History of Present Illness:  Kamilla Pat is a 78 y.o. female with a PMH of recent pulmonary embolism started on Eliquis, Parkinson's, CKD, recent COVID-19 infection, hyperlipidemia who presents with generalized weakness.  Patient was discharged from the hospital yesterday after being transitioned from heparin to Eliquis for her PE.  She noted that when she was getting into the car she started to feel more shaky and by the time the car arrived at her house, she felt she could not move her legs at all and would not be able to support her weight.  She notes this weakness does not feel like typical parkinsonian symptoms that she has had and that she has never been this shaky before.  She denies any headaches, nausea, fevers, chills, dysuria.  She states that she feels fine in terms of strength sitting down but knows that if she were to stand up right now she would be extremely weak and unsteady on her feet.    Review of Systems:  Review of Systems   Constitutional:  Negative for fatigue and fever.   Respiratory:  Negative for shortness of breath and wheezing.    Cardiovascular:  Negative for chest pain, palpitations and leg swelling.   Gastrointestinal:  Negative for nausea and vomiting.   Genitourinary:  Negative for dysuria, frequency and urgency.   Musculoskeletal:  Positive for gait problem.   Skin:  Negative for pallor.   Neurological:  Positive for weakness. Negative for dizziness, syncope and light-headedness.   Psychiatric/Behavioral:  Negative for confusion.        Past Medical and Surgical History:   Past Medical History:   Diagnosis Date    Anxiety     Hepatitis C 04/01/2017    screening Negative     High cholesterol     Hip pain     History of low potassium     Lower extremity edema     Obesity     Osteopenia     Overweight     Thigh pain        Past Surgical History:   Procedure Laterality Date    BLADDER SURGERY      CHOLECYSTECTOMY      COLOGUARD (HISTORICAL)  07/02/2018    COLONOSCOPY  01/01/2007    DXA PROCEDURE (HISTORICAL)  03/27/2014    MAMMO (HISTORICAL)  08/31/2016    REDUCTION MAMMAPLASTY      REPLACEMENT TOTAL KNEE Bilateral     TOTAL HIP ARTHROPLASTY Left 01/01/2015    TOTAL KNEE ARTHROPLASTY Left        Meds/Allergies:  Prior to Admission medications    Medication Sig Start Date End Date Taking? Authorizing Provider   apixaban (Eliquis) 5 mg Take 2 tablets (10 mg total) by mouth 2 (two) times a day for 7 days, THEN 1 tablet (5 mg total) 2 (two) times a day for 21 days. 1/12/24 2/9/24 Yes Jonathan Jenkins MD   carbidopa-levodopa (SINEMET)  mg per tablet Take 2.5 tablets by mouth 4 (four) times a day Take one tablet 3 times a day and then take 1.5 tablets at bedtime. 1/12/24 2/11/24 Yes Monica Rojas DO   diclofenac sodium (VOLTAREN) 1 % APPLY 2 GM TO AFFECTED AREA THREE TIMES A DAY AS NEEDED. 4/1/20  Yes Historical Provider, MD   Diclofenac Sodium (VOLTAREN) 1 % Apply 2 g topically 4 (four) times a day 8/15/23  Yes Gloria Christianson MD   ezetimibe (ZETIA) 10 mg tablet Take by mouth Daily 4/1/19  Yes Historical Provider, MD   potassium chloride (MICRO-K) 10 MEQ CR capsule Take 1 capsule by mouth Daily 4/1/19  Yes Historical Provider, MD   triamterene-hydrochlorothiazide (DYAZIDE) 37.5-25 mg per capsule Take 1 capsule by mouth Daily 4/1/19  Yes Historical Provider, MD     I have reviewed home medications with patient personally.    Allergies: No Known Allergies    Social History:  Marital Status: /Civil Union   Occupation: Unknown  Patient Pre-hospital Living Situation: Home  Patient Pre-hospital Level of Mobility: walks  Patient Pre-hospital Diet Restrictions:  None  Substance Use History:   Social History     Substance and Sexual Activity   Alcohol Use Yes    Comment: social, Occasional      Social History     Tobacco Use   Smoking Status Never   Smokeless Tobacco Never     Social History     Substance and Sexual Activity   Drug Use Never       Family History:  Family History   Problem Relation Age of Onset    Glaucoma Mother     Hypertension Mother     Glaucoma Father     Leukemia Father     Hypertension Father        Physical Exam:     Vitals:   Blood Pressure: 123/58 (01/12/24 1921)  Pulse: 84 (01/12/24 1921)  Temperature: (!) 97.4 °F (36.3 °C) (01/12/24 1921)  Temp Source: Oral (01/12/24 1921)  Respirations: 20 (01/12/24 1921)  SpO2: 99 % (01/12/24 1921)    Physical Exam  Constitutional:       General: She is not in acute distress.     Appearance: She is not ill-appearing.   HENT:      Head: Normocephalic and atraumatic.   Eyes:      Extraocular Movements: Extraocular movements intact.      Pupils: Pupils are equal, round, and reactive to light.   Cardiovascular:      Rate and Rhythm: Normal rate and regular rhythm.      Heart sounds: Normal heart sounds. No murmur heard.  Pulmonary:      Effort: Pulmonary effort is normal.      Breath sounds: Normal breath sounds.   Abdominal:      General: There is no distension.      Palpations: Abdomen is soft.      Tenderness: There is no abdominal tenderness. There is no right CVA tenderness, left CVA tenderness or guarding.   Musculoskeletal:      Right lower leg: No edema.      Left lower leg: No edema.   Skin:     Capillary Refill: Capillary refill takes less than 2 seconds.      Coloration: Skin is not jaundiced or pale.   Neurological:      General: No focal deficit present.      Mental Status: She is alert and oriented to person, place, and time.      Cranial Nerves: No cranial nerve deficit.      Sensory: No sensory deficit.      Motor: No weakness.      Comments: Slight resting tremor noted in lower extremities           Additional Data:     Lab Results:  Results from last 7 days   Lab Units 01/12/24  2321   WBC Thousand/uL 8.88   HEMOGLOBIN g/dL 12.4   HEMATOCRIT % 37.7   PLATELETS Thousands/uL 201   NEUTROS PCT % 85*   LYMPHS PCT % 7*   MONOS PCT % 7   EOS PCT % 0     Results from last 7 days   Lab Units 01/12/24  2321   SODIUM mmol/L 129*   POTASSIUM mmol/L 3.5   CHLORIDE mmol/L 96   CO2 mmol/L 20*   BUN mg/dL 16   CREATININE mg/dL 0.96   ANION GAP mmol/L 13   CALCIUM mg/dL 9.4   ALBUMIN g/dL 4.2   TOTAL BILIRUBIN mg/dL 1.50*   ALK PHOS U/L 83   ALT U/L <3*   AST U/L 5*   GLUCOSE RANDOM mg/dL 105     Results from last 7 days   Lab Units 01/11/24  2046   INR  1.07             Results from last 7 days   Lab Units 01/13/24  0045   LACTIC ACID mmol/L 1.0       Lines/Drains:  Invasive Devices       Peripheral Intravenous Line  Duration             Peripheral IV 01/12/24 Right Antecubital <1 day                        Imaging: No pertinent imaging reviewed.  No orders to display       EKG and Other Studies Reviewed on Admission:   EKG: No EKG obtained.    ** Please Note: This note has been constructed using a voice recognition system. **

## 2024-01-13 NOTE — QUICK NOTE
Patient expressed concern that her Sinemet dose was too low and that this was contributing to her inability to walk.  Per patient she takes 2.5 tablets 4 times daily.  On chart review through care everywhere, it looks as though she has had several calls to [facility] neurology in the last month with titration of her doses with both 2.5 and 2 tablets being recent dosages.  Per these phone call notes it appears that titration up and down of her Sinemet is met with inconsistent response.    Per patient's request, I have increased her Sinemet dosage to 2.5 tablets.

## 2024-01-14 ENCOUNTER — APPOINTMENT (INPATIENT)
Dept: CT IMAGING | Facility: HOSPITAL | Age: 79
DRG: 640 | End: 2024-01-14
Payer: MEDICARE

## 2024-01-14 ENCOUNTER — APPOINTMENT (INPATIENT)
Dept: MRI IMAGING | Facility: HOSPITAL | Age: 79
DRG: 640 | End: 2024-01-14
Payer: MEDICARE

## 2024-01-14 PROBLEM — R56.9 SEIZURE-LIKE ACTIVITY (HCC): Status: ACTIVE | Noted: 2024-01-14

## 2024-01-14 LAB
ANION GAP SERPL CALCULATED.3IONS-SCNC: 10 MMOL/L
ANION GAP SERPL CALCULATED.3IONS-SCNC: 11 MMOL/L
ATRIAL RATE: 72 BPM
BACTERIA UR CULT: NORMAL
BASE EXCESS BLDA CALC-SCNC: -12 MMOL/L (ref -2–3)
BASOPHILS # BLD AUTO: 0.03 THOUSANDS/ÂΜL (ref 0–0.1)
BASOPHILS NFR BLD AUTO: 1 % (ref 0–1)
BUN SERPL-MCNC: 13 MG/DL (ref 5–25)
BUN SERPL-MCNC: 14 MG/DL (ref 5–25)
CA-I BLD-SCNC: 1.16 MMOL/L (ref 1.12–1.32)
CALCIUM SERPL-MCNC: 8.8 MG/DL (ref 8.4–10.2)
CALCIUM SERPL-MCNC: 8.9 MG/DL (ref 8.4–10.2)
CHLORIDE SERPL-SCNC: 95 MMOL/L (ref 96–108)
CHLORIDE SERPL-SCNC: 95 MMOL/L (ref 96–108)
CO2 SERPL-SCNC: 21 MMOL/L (ref 21–32)
CO2 SERPL-SCNC: 24 MMOL/L (ref 21–32)
CREAT SERPL-MCNC: 0.71 MG/DL (ref 0.6–1.3)
CREAT SERPL-MCNC: 0.72 MG/DL (ref 0.6–1.3)
EOSINOPHIL # BLD AUTO: 0.25 THOUSAND/ÂΜL (ref 0–0.61)
EOSINOPHIL NFR BLD AUTO: 4 % (ref 0–6)
ERYTHROCYTE [DISTWIDTH] IN BLOOD BY AUTOMATED COUNT: 14.1 % (ref 11.6–15.1)
FIO2 GAS DIL.REBREATH: 50 L
GFR SERPL CREATININE-BSD FRML MDRD: 81 ML/MIN/1.73SQ M
GFR SERPL CREATININE-BSD FRML MDRD: 82 ML/MIN/1.73SQ M
GLUCOSE SERPL-MCNC: 102 MG/DL (ref 65–140)
GLUCOSE SERPL-MCNC: 120 MG/DL (ref 65–140)
GLUCOSE SERPL-MCNC: 85 MG/DL (ref 65–140)
GLUCOSE SERPL-MCNC: 93 MG/DL (ref 65–140)
HCO3 BLDA-SCNC: 13 MMOL/L (ref 22–28)
HCT VFR BLD AUTO: 35.4 % (ref 34.8–46.1)
HCT VFR BLD CALC: 34 % (ref 34.8–46.1)
HGB BLD-MCNC: 11.9 G/DL (ref 11.5–15.4)
HGB BLDA-MCNC: 11.6 G/DL (ref 11.5–15.4)
IMM GRANULOCYTES # BLD AUTO: 0.06 THOUSAND/UL (ref 0–0.2)
IMM GRANULOCYTES NFR BLD AUTO: 1 % (ref 0–2)
LYMPHOCYTES # BLD AUTO: 0.73 THOUSANDS/ÂΜL (ref 0.6–4.47)
LYMPHOCYTES NFR BLD AUTO: 11 % (ref 14–44)
MCH RBC QN AUTO: 28 PG (ref 26.8–34.3)
MCHC RBC AUTO-ENTMCNC: 33.6 G/DL (ref 31.4–37.4)
MCV RBC AUTO: 83 FL (ref 82–98)
MONOCYTES # BLD AUTO: 0.71 THOUSAND/ÂΜL (ref 0.17–1.22)
MONOCYTES NFR BLD AUTO: 11 % (ref 4–12)
NEUTROPHILS # BLD AUTO: 4.74 THOUSANDS/ÂΜL (ref 1.85–7.62)
NEUTS SEG NFR BLD AUTO: 72 % (ref 43–75)
NRBC BLD AUTO-RTO: 0 /100 WBCS
P AXIS: 53 DEGREES
PCO2 BLD: 14 MMOL/L (ref 21–32)
PCO2 BLD: 26.1 MM HG (ref 36–44)
PH BLD: 7.3 [PH] (ref 7.35–7.45)
PLATELET # BLD AUTO: 202 THOUSANDS/UL (ref 149–390)
PMV BLD AUTO: 12.4 FL (ref 8.9–12.7)
PO2 BLD: 138 MM HG (ref 75–129)
POTASSIUM BLD-SCNC: 2.9 MMOL/L (ref 3.5–5.3)
POTASSIUM SERPL-SCNC: 3.4 MMOL/L (ref 3.5–5.3)
POTASSIUM SERPL-SCNC: 3.5 MMOL/L (ref 3.5–5.3)
PR INTERVAL: 190 MS
QRS AXIS: 40 DEGREES
QRSD INTERVAL: 82 MS
QT INTERVAL: 424 MS
QTC INTERVAL: 464 MS
RBC # BLD AUTO: 4.25 MILLION/UL (ref 3.81–5.12)
SAO2 % BLD FROM PO2: 99 % (ref 60–85)
SODIUM BLD-SCNC: 126 MMOL/L (ref 136–145)
SODIUM SERPL-SCNC: 127 MMOL/L (ref 135–147)
SODIUM SERPL-SCNC: 129 MMOL/L (ref 135–147)
SPECIMEN SOURCE: ABNORMAL
T WAVE AXIS: 46 DEGREES
VENTRICULAR RATE: 72 BPM
WBC # BLD AUTO: 6.52 THOUSAND/UL (ref 4.31–10.16)

## 2024-01-14 PROCEDURE — 80048 BASIC METABOLIC PNL TOTAL CA: CPT

## 2024-01-14 PROCEDURE — 82948 REAGENT STRIP/BLOOD GLUCOSE: CPT

## 2024-01-14 PROCEDURE — 99223 1ST HOSP IP/OBS HIGH 75: CPT | Performed by: PSYCHIATRY & NEUROLOGY

## 2024-01-14 PROCEDURE — 84132 ASSAY OF SERUM POTASSIUM: CPT

## 2024-01-14 PROCEDURE — 80048 BASIC METABOLIC PNL TOTAL CA: CPT | Performed by: INTERNAL MEDICINE

## 2024-01-14 PROCEDURE — 82947 ASSAY GLUCOSE BLOOD QUANT: CPT

## 2024-01-14 PROCEDURE — 84295 ASSAY OF SERUM SODIUM: CPT

## 2024-01-14 PROCEDURE — 93005 ELECTROCARDIOGRAM TRACING: CPT

## 2024-01-14 PROCEDURE — 82803 BLOOD GASES ANY COMBINATION: CPT

## 2024-01-14 PROCEDURE — 85025 COMPLETE CBC W/AUTO DIFF WBC: CPT | Performed by: INTERNAL MEDICINE

## 2024-01-14 PROCEDURE — 82330 ASSAY OF CALCIUM: CPT

## 2024-01-14 PROCEDURE — 99232 SBSQ HOSP IP/OBS MODERATE 35: CPT | Performed by: INTERNAL MEDICINE

## 2024-01-14 PROCEDURE — 70551 MRI BRAIN STEM W/O DYE: CPT

## 2024-01-14 PROCEDURE — 99232 SBSQ HOSP IP/OBS MODERATE 35: CPT

## 2024-01-14 PROCEDURE — 70450 CT HEAD/BRAIN W/O DYE: CPT

## 2024-01-14 PROCEDURE — 85014 HEMATOCRIT: CPT

## 2024-01-14 PROCEDURE — G1004 CDSM NDSC: HCPCS

## 2024-01-14 RX ORDER — LEVETIRACETAM 500 MG/1
750 TABLET ORAL EVERY 12 HOURS SCHEDULED
Status: DISCONTINUED | OUTPATIENT
Start: 2024-01-15 | End: 2024-01-16 | Stop reason: HOSPADM

## 2024-01-14 RX ORDER — POTASSIUM CHLORIDE 14.9 MG/ML
20 INJECTION INTRAVENOUS
Qty: 200 ML | Refills: 0 | Status: COMPLETED | OUTPATIENT
Start: 2024-01-14 | End: 2024-01-15

## 2024-01-14 RX ORDER — CYCLOBENZAPRINE HCL 10 MG
5 TABLET ORAL 3 TIMES DAILY PRN
Status: DISCONTINUED | OUTPATIENT
Start: 2024-01-14 | End: 2024-01-14

## 2024-01-14 RX ADMIN — CARBIDOPA AND LEVODOPA 2.5 TABLET: 25; 100 TABLET ORAL at 08:48

## 2024-01-14 RX ADMIN — CYCLOBENZAPRINE 5 MG: 10 TABLET, FILM COATED ORAL at 14:25

## 2024-01-14 RX ADMIN — POTASSIUM CHLORIDE 20 MEQ: 14.9 INJECTION, SOLUTION INTRAVENOUS at 18:02

## 2024-01-14 RX ADMIN — APIXABAN 10 MG: 5 TABLET, FILM COATED ORAL at 08:48

## 2024-01-14 RX ADMIN — POTASSIUM CHLORIDE 10 MEQ: 1.5 SOLUTION ORAL at 08:48

## 2024-01-14 RX ADMIN — LEVETIRACETAM 2000 MG: 100 INJECTION, SOLUTION INTRAVENOUS at 17:52

## 2024-01-14 RX ADMIN — CARBIDOPA AND LEVODOPA 2.5 TABLET: 25; 100 TABLET ORAL at 12:36

## 2024-01-14 RX ADMIN — POTASSIUM CHLORIDE 20 MEQ: 14.9 INJECTION, SOLUTION INTRAVENOUS at 20:08

## 2024-01-14 NOTE — ASSESSMENT & PLAN NOTE
Continue Sinemet home regimen: 1 tablet at 9:30 AM, 1 tablet at 1:30 PM, 1 tablet at 5:30 PM, and 1.5 tablets at 9:30 PM  Trialed PRN low-dose antispasmodic for jaw twitching

## 2024-01-14 NOTE — CONSULTS
Consultation - Neurology   Kamilla Pat 78 y.o. female MRN: [id number]  Unit/Bed#: ICU 12 Encounter: [id number]      Assessment/Plan     Seizure-like activity (HCC)  Assessment & Plan  79 y/o F with HTN, HLD, and Parkinson's disease, recently admitted for COVID infection complicated by PE now on apixaban and readmitted for UTI and hyponatremia with generalized weakness, now presenting with seizure-like activity consisting of episodic jaw jerking, unresponsiveness, and an episode of generalized clonic activity with an apparent post-ictal state and tongue laceration concerning for epileptic seizure. Unclear if this would be potentially provoked or due to an underlying CNS insult as it appears these episodes may at least be initially focal, which would suggest a hemispheric dysfunction.    -close neurochecks; upgrade to stepdown and notify with changes or further seizure-like activity  -seizure precautions  -HCT reviewed with official read pending - no clear acute pathology on my read  -obtain MRI brain w/wo contrast  -obtain routine EEG for now; if events are recurrent or there is concern for subclinical activity may need video EEG monitoring  -load with 2g IV Keppra now and continue on 750mg BID maintenance  -Ativan PRN for any event lasting >3-5 mins or recurrent events without return to baseline in between  -management of metabolic and infectious conditions as per primary team  -will follow; call with questions      Recommendations for outpatient neurological follow up have yet to be determined.    Reason for Consult / Principal Problem: concern for seizure, jaw jerking  Hx and PE limited by: mental status    HPI: Kamilla Pat is a 78 y.o. female with HTN, HLD, and Parkinson's disease on Sinemet who presents with altered mental status and concern for seizure activity. Pt had initially presented on 1/11 in the setting of a COVID infection and was found to have subsegmental PEs upon work-up. She had been  discharged on apixaban yesterday but returned immediately back to the ED as she was too weak to get home. She was noted to be hyponatremic at 129 and was found to have a possible UTI; CTX was started.    Neurology was consulted for intermittent jaw jerking and an episode of AMS for which a rapid response was called. Family was with the patient earlier today as well as nursing staff, who were able to provide some details. Pt has been having intermittent episodes of jaw jerking during which one side of her jaw (unclear which) appears to tremor and she has difficulty communicating during that time until it resolves. This happened a few times today per nursing. Possible she was having these at home prior to admission days ago. Then family was with her at around 4pm when she was then noted to begin having jaw jerking again (to one side but difficulty explaining which), went unresponsive, then was described to look up and have generalized clonic activity in all extremities with arching of her back. This lasted for about 30 seconds before it self-resolved. She then appeared very tired and poorly responsive. Rapid response noted a L gaze following the event. During my evaluation after consultation, she had no gaze preference or other focal findings but was somnolent and had a tongue laceration on the R. A stat head CT was performed, which was personally reviewed and did not have any clear acute pathology per my read.    Inpatient consult to Neurology  Consult performed by: Donaldo Shane DO  Consult ordered by: Akhil Magallon MD        Review of Systems   Unable to perform ROS: Mental status change       Historical Information   Past Medical History:   Diagnosis Date    Anxiety     Hepatitis C 04/01/2017    screening Negative    High cholesterol     Hip pain     History of low potassium     Lower extremity edema     Obesity     Osteopenia     Overweight     Thigh pain      Past Surgical History:   Procedure Laterality Date     BLADDER SURGERY      CHOLECYSTECTOMY      COLOGUARD (HISTORICAL)  07/02/2018    COLONOSCOPY  01/01/2007    DXA PROCEDURE (HISTORICAL)  03/27/2014    MAMMO (HISTORICAL)  08/31/2016    REDUCTION MAMMAPLASTY      REPLACEMENT TOTAL KNEE Bilateral     TOTAL HIP ARTHROPLASTY Left 01/01/2015    TOTAL KNEE ARTHROPLASTY Left      Social History   Social History     Substance and Sexual Activity   Alcohol Use Yes    Comment: social, Occasional      Social History     Substance and Sexual Activity   Drug Use Never     E-Cigarette/Vaping    E-Cigarette Use Never User      E-Cigarette/Vaping Substances    Nicotine No     THC No     CBD No     Flavoring No     Other No     Unknown No      Social History     Tobacco Use   Smoking Status Never   Smokeless Tobacco Never     Family History:   Family History   Problem Relation Age of Onset    Glaucoma Mother     Hypertension Mother     Glaucoma Father     Leukemia Father     Hypertension Father        Review of previous medical records was completed.    Meds/Allergies   all current active meds have been reviewed, current meds:   Current Facility-Administered Medications   Medication Dose Route Frequency    apixaban (ELIQUIS) tablet 10 mg  10 mg Oral BID    Followed by    [START ON 1/19/2024] apixaban (ELIQUIS) tablet 5 mg  5 mg Oral BID    [Transfer Hold] carbidopa-levodopa (SINEMET)  mg per tablet 2.5 tablet  2.5 tablet Oral 4x Daily    Diclofenac Sodium (VOLTAREN) 1 % topical gel 2 g  2 g Topical TID    Diclofenac Sodium (VOLTAREN) 1 % topical gel 2 g  2 g Topical 4x Daily PRN    ezetimibe (ZETIA) tablet 10 mg  10 mg Oral HS    [Transfer Hold] levETIRAcetam (KEPPRA) 2,000 mg in sodium chloride 0.9 % 250 mL IVPB  2,000 mg Intravenous Once    [Transfer Hold] levETIRAcetam (KEPPRA) tablet 750 mg  750 mg Oral Q12H CESAR    [Transfer Hold] potassium chloride 20 mEq IVPB (premix)  20 mEq Intravenous Q2H    potassium chloride oral solution 10 mEq  10 mEq Oral Daily   , and PTA meds:  "  Prior to Admission Medications   Prescriptions Last Dose Informant Patient Reported? Taking?   Calcium Carbonate-Vitamin D (CALCIUM-CARB 600 + D PO)   Yes No   Sig: Take 1 tablet by mouth daily   Cholecalciferol 50 MCG (2000 UT) CAPS   Yes No   Sig: Take 1 capsule by mouth daily   Diclofenac Sodium (VOLTAREN) 1 % 1/12/2024  No Yes   Sig: Apply 2 g topically 4 (four) times a day   acetaminophen (TYLENOL) 325 mg tablet   Yes No   Sig: Take 325 mg by mouth every 6 (six) hours as needed for mild pain   apixaban (Eliquis) 5 mg 1/12/2024  No Yes   Sig: Take 2 tablets (10 mg total) by mouth 2 (two) times a day for 7 days, THEN 1 tablet (5 mg total) 2 (two) times a day for 21 days.   carbidopa-levodopa (SINEMET)  mg per tablet 1/12/2024  No Yes   Sig: Take 2.5 tablets by mouth 4 (four) times a day Take one tablet 3 times a day and then take 1.5 tablets at bedtime.   diclofenac sodium (VOLTAREN) 1 % 1/12/2024  Yes Yes   Sig: APPLY 2 GM TO AFFECTED AREA THREE TIMES A DAY AS NEEDED.   ezetimibe (ZETIA) 10 mg tablet 1/12/2024  Yes Yes   Sig: Take by mouth Daily   potassium chloride (MICRO-K) 10 MEQ CR capsule 1/12/2024  Yes Yes   Sig: Take 1 capsule by mouth Daily   triamterene-hydrochlorothiazide (DYAZIDE) 37.5-25 mg per capsule 1/12/2024  Yes Yes   Sig: Take 1 capsule by mouth Daily      Facility-Administered Medications: None       Allergies   Allergen Reactions    Hydrocodone-Acetaminophen GI Intolerance    Hydromorphone GI Intolerance    Oxycodone-Acetaminophen GI Intolerance    Statins Myalgia       Objective   Vitals:Blood pressure (!) 190/81, pulse 82, temperature 97.6 °F (36.4 °C), resp. rate 16, height 5' 4\" (1.626 m), weight 72.7 kg (160 lb 4.4 oz), SpO2 96%.,Body mass index is 27.51 kg/m².    Intake/Output Summary (Last 24 hours) at 1/14/2024 172  Last data filed at 1/14/2024 0414  Gross per 24 hour   Intake --   Output 300 ml   Net -300 ml       Invasive Devices:   Invasive Devices       Peripheral " Intravenous Line  Duration             Peripheral IV 01/12/24 Right Antecubital 1 day    Peripheral IV 01/14/24 Left Hand <1 day                    Physical Exam  Constitutional:       Appearance: She is well-developed.   HENT:      Head: Normocephalic and atraumatic.   Eyes:      Pupils: Pupils are equal, round, and reactive to light.   Cardiovascular:      Rate and Rhythm: Normal rate and regular rhythm.      Heart sounds: Normal heart sounds.   Pulmonary:      Effort: Pulmonary effort is normal.      Breath sounds: Normal breath sounds.   Abdominal:      General: Bowel sounds are normal.      Palpations: Abdomen is soft.   Musculoskeletal:      Cervical back: Normal range of motion and neck supple.       Neurologic Exam     Mental Status   Somnolent but arousable with repeat stimulation  Able to respond mostly respond appropriately for a brief period of time before drifting off again  Oriented only to self and hospital  Inattentive throughout exam  Mildly dysarthric  Unable to name or repeat but could follow some simple appendicular commands bilaterally     Cranial Nerves     CN III, IV, VI   Pupils are equal, round, and reactive to light.    CN VII   Facial expression full, symmetric.     CN XII   Tongue deviation: none  Blinks to threat bilaterally  No gaze preference  R lateral tongue laceration     Motor Exam At least 4/5 strength throughout with significant encouragement and no clear asymmetry     Sensory Exam   Withdraws all extremities briskly with noxious stim     Gait, Coordination, and Reflexes     Reflexes   Reflexes 2+ except as noted. Could not cooperate with coordination testing given mental status       Lab Results: I have personally reviewed pertinent reports.  , CBC:   Results from last 7 days   Lab Units 01/14/24  1602 01/14/24  0417 01/12/24  2321 01/12/24  0333   WBC Thousand/uL  --  6.52 8.88 6.39   RBC Million/uL  --  4.25 4.49 4.19   HEMOGLOBIN g/dL  --  11.9 12.4 11.7   I STAT HEMOGLOBIN  g/dl 11.6  --   --   --    HEMATOCRIT %  --  35.4 37.7 35.2   HEMATOCRIT, ISTAT % 34*  --   --   --    MCV fL  --  83 84 84   PLATELETS Thousands/uL  --  202 201 216   , BMP/CMP:   Results from last 7 days   Lab Units 01/14/24  1602 01/14/24  1147 01/14/24  0417 01/13/24  1207 01/13/24  0959 01/12/24  2321 01/12/24  0333 01/11/24  1013   SODIUM mmol/L  --  129* 127* 128*   < > 129*   < > 133*   POTASSIUM mmol/L  --  3.4* 3.5 4.0   < > 3.5   < > 3.0*   CHLORIDE mmol/L  --  95* 95* 97   < > 96   < > 97   CO2 mmol/L  --  24 21 22   < > 20*   < > 20*   CO2, I-STAT mmol/L 14*  --   --   --   --   --   --   --    BUN mg/dL  --  13 14 14   < > 16   < > 10   CREATININE mg/dL  --  0.72 0.71 0.73   < > 0.96   < > 0.82   GLUCOSE, ISTAT mg/dl 120  --   --   --   --   --   --   --    CALCIUM mg/dL  --  8.9 8.8 8.7   < > 9.4   < > 9.4   AST U/L  --   --   --   --   --  5*  --  5*   ALT U/L  --   --   --   --   --  <3*  --  <3*   ALK PHOS U/L  --   --   --   --   --  83  --  78   EGFR ml/min/1.73sq m  --  81 82 80   < > 57   < > 69    < > = values in this interval not displayed.   , Urinalysis:   Results from last 7 days   Lab Units 01/12/24  2320   COLOR UA  Dark Yellow   CLARITY UA  Hazy   SPEC GRAV UA  1.015   PH UA  7.5   LEUKOCYTES UA  Small*   NITRITE UA  Positive*   GLUCOSE UA mg/dl Negative   KETONES UA mg/dl 15 (1+)*   BILIRUBIN UA  Small*   BLOOD UA  Negative     Imaging Studies: I have personally reviewed pertinent films in PACS  EKG, Pathology, and Other Studies: I have personally reviewed pertinent reports.    VTE Prophylaxis: Sequential compression device (Venodyne)     Code Status: Level 1 - Full Code

## 2024-01-14 NOTE — ASSESSMENT & PLAN NOTE
Recent Labs     01/13/24  0959 01/13/24  1207 01/14/24  0417   SODIUM 129* 128* 127*       Prior to discharge sodium was 131, but upon returning to the ED it was 129.  Patient is asymptomatic and has already received 1 L normal saline bolus.  Will recheck sodium to determine if additional correction is required    Continue to monitor; consider SIADH workup in setting of persistent hyponatremia

## 2024-01-14 NOTE — ASSESSMENT & PLAN NOTE
Assessment:  Patient is a 78-year-old female with a past medical history of hypertension, hyperlipidemia, Parkinson disease who was recently admitted for COVID infection complicated by pulmonary emboli currently on apixaban and readmitted for urine tract infection and hyponatremia General weakness, the presented with seizure-like activity consisting of episodic jaw jerking, unresponsiveness, and an episode of generalized tonic-clonic activity with apparent postictal state and tongue laceration concerning for elliptic seizure.     Impression:  Etiology of seizures unclear, may potentially be provoked or due to underlying CNS insult as episodes reported to be initially focal which may be demonstrative of hemispheric dysfunction.    Plan:  - Case discussed with attending neurologist Dr. Galloway  - Recommend continuing neurologic checks  - Recommend fall precautions, seizure precautions  - Recommend MRI brain with contrast (okay to complete in outpatient setting)  - [employer] paperwork to be completed before discharge  - Routine EEG results pending  - Recommend continuing levetiracetam 750 mg every 12 hours  - Recommend lorazepam 0.1 mg/kg for any event lasting more than 3 to 5 minutes or recurrent events without return to baseline in between  - Recommend noncontrast CT head for any acute changes status or neurologic examination felt not to be secondary to seizure activity.  - Appreciate management metabolic, infectious, inflammatory derangements per primary team  - DVT prophylaxis per primary team  - No further inpatient neurology recommendations at this time, please call for any questions or concerns  - Patient may follow-up in outpatient neurology setting with advanced practitioner in 4 to 6 weeks.  If MRI is completed inpatient, further outpatient imaging is not required.  - Rest of care per primary team appreciated

## 2024-01-14 NOTE — QUICK NOTE
Rapid response called 1555 for altered mental status. Patient listless and nonverbal. Awake and withdrawing to noxious stimuli. Tongue lac appreciated on exam. I-stat unremarkable but for a K+ of 2.9. Ordered 40 mEq IV potassium. Neurology consulted earlier today and aware. Ordered CT head and spot EEG. Critical care at bedside discussed goals of care with family. Patient remains full code. Made NPO for now. Neurology at bedside recommended 1X dose of Keppra 2000 mg IV with maintenance dose to stat tomorrow. CT and MRI pending per neurology recs. Patient now SD level 2.     Akhil Magallon MD

## 2024-01-14 NOTE — PLAN OF CARE
Problem: Potential for Falls  Goal: Patient will remain free of falls  Description: INTERVENTIONS:  - Educate patient/family on patient safety including physical limitations  - Instruct patient to call for assistance with activity   - Consult OT/PT to assist with strengthening/mobility   - Keep Call bell within reach  - Keep bed low and locked with side rails adjusted as appropriate  - Keep care items and personal belongings within reach  - Initiate and maintain comfort rounds  - Make Fall Risk Sign visible to staff  - Offer Toileting every  Hours, in advance of need  - Initiate/Maintain alarm  - Obtain necessary fall risk management equipment:   - Apply yellow socks and bracelet for high fall risk patients  - Consider moving patient to room near nurses station  Outcome: Progressing     Problem: SAFETY ADULT  Goal: Patient will remain free of falls  Description: INTERVENTIONS:  - Educate patient/family on patient safety including physical limitations  - Instruct patient to call for assistance with activity   - Consult OT/PT to assist with strengthening/mobility   - Keep Call bell within reach  - Keep bed low and locked with side rails adjusted as appropriate  - Keep care items and personal belongings within reach  - Initiate and maintain comfort rounds  - Make Fall Risk Sign visible to staff  - Offer Toileting every  Hours, in advance of need  - Initiate/Maintain alarm  - Obtain necessary fall risk management equipment:   - Apply yellow socks and bracelet for high fall risk patients  - Consider moving patient to room near nurses station  Outcome: Progressing  Goal: Maintain or return to baseline ADL function  Description: INTERVENTIONS:  -  Assess patient's ability to carry out ADLs; assess patient's baseline for ADL function and identify physical deficits which impact ability to perform ADLs (bathing, care of mouth/teeth, toileting, grooming, dressing, etc.)  - Assess/evaluate cause of self-care deficits   -  Assess range of motion  - Assess patient's mobility; develop plan if impaired  - Assess patient's need for assistive devices and provide as appropriate  - Encourage maximum independence but intervene and supervise when necessary  - Involve family in performance of ADLs  - Assess for home care needs following discharge   - Consider OT consult to assist with ADL evaluation and planning for discharge  - Provide patient education as appropriate  Outcome: Progressing  Goal: Maintains/Returns to pre admission functional level  Description: INTERVENTIONS:  - Perform AM-PAC 6 Click Basic Mobility/ Daily Activity assessment daily.  - Set and communicate daily mobility goal to care team and patient/family/caregiver.   - Collaborate with rehabilitation services on mobility goals if consulted  - Perform Range of Motion  times a day.  - Reposition patient every  hours.  - Dangle patient  times a day  - Stand patient  times a day  - Ambulate patient  times a day  - Out of bed to chair  times a day   - Out of bed for meals  times a day  - Out of bed for toileting  - Record patient progress and toleration of activity level   Outcome: Progressing     Problem: NEUROSENSORY - ADULT  Goal: Achieves stable or improved neurological status  Description: INTERVENTIONS  - Monitor and report changes in neurological status  - Monitor vital signs such as temperature, blood pressure, glucose, and any other labs ordered   - Initiate measures to prevent increased intracranial pressure  - Monitor for seizure activity and implement precautions if appropriate      Outcome: Progressing  Goal: Remains free of injury related to seizures activity  Description: INTERVENTIONS  - Maintain airway, patient safety  and administer oxygen as ordered  - Monitor patient for seizure activity, document and report duration and description of seizure to physician/advanced practitioner  - If seizure occurs,  ensure patient safety during seizure  - Reorient patient  post seizure  - Seizure pads on all 4 side rails  - Instruct patient/family to notify RN of any seizure activity including if an aura is experienced  - Instruct patient/family to call for assistance with activity based on nursing assessment  - Administer anti-seizure medications if ordered    Outcome: Progressing  Goal: Achieves maximal functionality and self care  Description: INTERVENTIONS  - Monitor swallowing and airway patency with patient fatigue and changes in neurological status  - Encourage and assist patient to increase activity and self care.   - Encourage visually impaired, hearing impaired and aphasic patients to use assistive/communication devices  Outcome: Progressing     Problem: SKIN/TISSUE INTEGRITY - ADULT  Goal: Skin Integrity remains intact(Skin Breakdown Prevention)  Description: Assess:  -Perform Rpi assessment every   -Clean and moisturize skin every   -Inspect skin when repositioning, toileting, and assisting with ADLS  -Assess under medical devices such as  every   -Assess extremities for adequate circulation and sensation     Bed Management:  -Have minimal linens on bed & keep smooth, unwrinkled  -Change linens as needed when moist or perspiring  -Avoid sitting or lying in one position for more than  hours while in bed  -Keep HOB at degrees     Toileting:  -Offer bedside commode  -Assess for incontinence every   -Use incontinent care products after each incontinent episode such as     Activity:  -Mobilize patient  times a day  -Encourage activity and walks on unit  -Encourage or provide ROM exercises   -Turn and reposition patient every  Hours  -Use appropriate equipment to lift or move patient in bed  -Instruct/ Assist with weight shifting every  when out of bed in chair  -Consider limitation of chair time  hour intervals    Skin Care:  -Avoid use of baby powder, tape, friction and shearing, hot water or constrictive clothing  -Relieve pressure over bony prominences using   -Do not  massage red bony areas    Next Steps:  -Teach patient strategies to minimize risks such as    -Consider consults to  interdisciplinary teams such as   Outcome: Progressing  Goal: Incision(s), wounds(s) or drain site(s) healing without S/S of infection  Description: INTERVENTIONS  - Assess and document dressing, incision, wound bed, drain sites and surrounding tissue  - Provide patient and family education  - Perform skin care/dressing changes every   Outcome: Progressing  Goal: Pressure injury heals and does not worsen  Description: Interventions:  - Implement low air loss mattress or specialty surface (Criteria met)  - Apply silicone foam dressing  - Instruct/assist with weight shifting every  minutes when in chair   - Limit chair time to  hour intervals  - Use special pressure reducing interventions such as  when in chair   - Apply fecal or urinary incontinence containment device   - Perform passive or active ROM every   - Turn and reposition patient & offload bony prominences every  hours   - Utilize friction reducing device or surface for transfers   - Consider consults to  interdisciplinary teams such as   - Use incontinent care products after each incontinent episode such as  - Consider nutrition services referral as needed  Outcome: Progressing

## 2024-01-14 NOTE — ASSESSMENT & PLAN NOTE
Upon discharge yesterday, patient felt extremely shaky and generally weak, she notes this is different from her typical parkinsonian shakiness.  When car arrived at home she simply could not get her legs to move so return to the emergency department.  No focal deficits concerning for a bleed given new anticoagulation.  Magnesium was slightly low at 1.8 which can contribute.  Urinalysis appears to indicate signs of a UTI and patient received a dose of Rocephin in the ED    Results from last 7 days   Lab Units 01/12/24  2320   LEUKOCYTES UA  Small*   NITRITE UA  Positive*   GLUCOSE UA mg/dl Negative   KETONES UA mg/dl 15 (1+)*   BLOOD UA  Negative   WBC UA /hpf 20-30*   RBC UA /hpf 4-10*   BACTERIA UA /hpf None Seen       UA as above   Continue Rocephin  PT and OT

## 2024-01-14 NOTE — ASSESSMENT & PLAN NOTE
Continue Sinemet home regimen: 1 tablet at 9:30 AM, 1 tablet at 1:30 PM, 1 tablet at 5:30 PM, and 1.5 tablets at 9:30 PM  Will follow up with Neurology.

## 2024-01-14 NOTE — RAPID RESPONSE
Rapid Response Note  Kamilla Pat 78 y.o. female MRN: [id number]  Unit/Bed#: S -01 Encounter: [id number]    Rapid Response Notification(s):   Response called date/time:  1/14/2024 3:47 PM  Response team arrival date/time:  1/14/2024 3:48 PM  Response end date/time:  1/14/2024 4:10 PM  Level of care:  Medsur  Rapid response location:  [facility] unit  Primary reason for rapid response call:  Acute change in neuro status    Rapid Response Intervention(s):   Airway:  Suction  Breathing:  Oxygen  Circulation:  None  Fluids administered:  None  Medications administered:  None       Assessment:   Seizure like activity   Altered Mental Status in Postictal State     Plan:   STAT CT head   Complete ISTAT now   Maintain sat > 90.%  Seizure precautions  NPO   Fall precaution   Consider EEG   Neuro consulted and awaiting formal recs   Upgrade stepdown 2 with SLIM      Rapid Response Outcome:   Transfer:  Transfer to stepdown 2  Primary service notified of transfer: Yes    Code Status: Level 1 (Full Code)      Family notified of transfer: yes  Family member contacted:  present.      Background/Situation:   Kamilla Pat is a 78 y.o. female who has been fairly ill with COVID and PE on Eliquis. She has a history of Parkinson's and has had confusion/noncompliance with medication regimen. She was discharged from the hospital but, returned immediately for weakness. This afternoon her family witnessed her whole body shake, eye roll and back arch. After that she just stopped and stared. No interaction with the family following. RRT was called and patient was met in a postictal state. She had a left downward gaze. New tongue laceration. She was purposeful to painful stimuli but, otherwise not responsive. She was not following commands. Hemodynamically stable.  Oxygenating well on 4L NC.   Family discussion and wants to maintain full code status. Upgrade to SD Level 2 with SLIM. Will CT head at this time and consider  "EEG.     Review of Systems   Unable to perform ROS: Mental status change       Objective:   Vitals:    01/14/24 1038 01/14/24 1452 01/14/24 1549 01/14/24 1550   BP: 120/68 133/69 (!) 178/98 (!) 190/81   Pulse: 74 77 79 82   Resp:  16     Temp:  97.6 °F (36.4 °C)     TempSrc:       SpO2: 94% 95% 98% 96%   Weight:       Height:         Physical Exam  Constitutional:       Appearance: She is ill-appearing.      Interventions: Nasal cannula in place.   HENT:      Mouth/Throat:      Comments: Tongue laceration - right side.   Eyes:      Pupils: Pupils are equal, round, and reactive to light.   Cardiovascular:      Rate and Rhythm: Normal rate and regular rhythm.      Pulses: Normal pulses.   Pulmonary:      Effort: Pulmonary effort is normal.   Skin:     General: Skin is warm.      Capillary Refill: Capillary refill takes less than 2 seconds.      Coloration: Skin is pale.   Neurological:      Mental Status: She is lethargic.      GCS: GCS eye subscore is 4. GCS verbal subscore is 1. GCS motor subscore is 5.      Motor: Weakness present. No tremor.      Comments: Left downward gaze.   Localizes to painful stimuli.          Portions of the record may have been created with voice recognition software.  Occasional wrong word or \"sound a like\" substitutions may have occurred due to the inherent limitations of voice recognition software.  Read the chart carefully and recognize, using context, where substitutions have occurred.    RADHA Fraire      "

## 2024-01-14 NOTE — PROGRESS NOTES
[facility]  Progress Note  Name: Kamilla Pat I  MRN: [id number]  Unit/Bed#: S -01 I Date of Admission: 1/12/2024   Date of Service: 1/14/2024 I Hospital Day: 1    Assessment/Plan   * Ambulatory dysfunction  Assessment & Plan  Upon discharge yesterday, patient felt extremely shaky and generally weak, she notes this is different from her typical parkinsonian shakiness.  When car arrived at home she simply could not get her legs to move so return to the emergency department.  No focal deficits concerning for a bleed given new anticoagulation.  Magnesium was slightly low at 1.8 which can contribute.  Urinalysis appears to indicate signs of a UTI and patient received a dose of Rocephin in the ED    Results from last 7 days   Lab Units 01/12/24  2320   LEUKOCYTES UA  Small*   NITRITE UA  Positive*   GLUCOSE UA mg/dl Negative   KETONES UA mg/dl 15 (1+)*   BLOOD UA  Negative   WBC UA /hpf 20-30*   RBC UA /hpf 4-10*   BACTERIA UA /hpf None Seen       UA as above   Continue Rocephin  PT and OT    Hyponatremia  Assessment & Plan  Recent Labs     01/13/24  0959 01/13/24  1207 01/14/24  0417   SODIUM 129* 128* 127*       Prior to discharge sodium was 131, but upon returning to the ED it was 129.  Patient is asymptomatic and has already received 1 L normal saline bolus.  Will recheck sodium to determine if additional correction is required    Continue to monitor; consider SIADH workup in setting of persistent hyponatremia    Multiple subsegmental pulmonary emboli without acute cor pulmonale (HCC)  Assessment & Plan  COVID-19 treated with Paxlovid December 2023  dyspnea on exertion, continued dry cough, no hemoptysis.  CTA chest found multiple bilateral subsegmental pulmonary embolism  Transitioned from heparin to Eliquis prior to discharge home on 1/12    Plan:  Continue Eliquis for 6 months  Follow-up echocardiogram 3 to 6 months    Pure hypercholesterolemia  Assessment & Plan  Continue home  Shaitia    Parkinson disease  Assessment & Plan  Continue Sinemet home regimen: 1 tablet at 9:30 AM, 1 tablet at 1:30 PM, 1 tablet at 5:30 PM, and 1.5 tablets at 9:30 PM  Trialed PRN low-dose antispasmodic for jaw twitching             VTE Pharmacologic Prophylaxis: VTE Score: 7 High Risk (Score >/= 5) - Pharmacological DVT Prophylaxis Ordered: apixaban (Eliquis). Sequential Compression Devices Ordered.    Mobility:   Basic Mobility Inpatient Raw Score: 20  -HLM Goal: 6: Walk 10 steps or more  JH-HLM Achieved: 2: Bed activities/Dependent transfer  HLM Goal NOT achieved. Continue with multidisciplinary rounding and encourage appropriate mobility to improve upon HLM goals.    Patient Centered Rounds: I performed bedside rounds with nursing staff today.  Discussions with Specialists or Other Care Team Provider: Pulmonology    Education and Discussions with Family / Patient: Updated  () via phone.    Current Length of Stay: 1 day(s)  Current Patient Status: Inpatient   Discharge Plan: Anticipate discharge tomorrow to be determined pending PT/OT evals    Code Status: Level 1 - Full Code    Subjective:   No acute events overnight. Discussed restarting prescribed parkinson's regimen and symptom control pending PT/OT evaluation. No further concerns at this time. Stable for discharge.     Objective:     Vitals:   Temp (24hrs), Av.3 °F (36.8 °C), Min:97.7 °F (36.5 °C), Max:99.5 °F (37.5 °C)    Temp:  [97.7 °F (36.5 °C)-99.5 °F (37.5 °C)] 97.7 °F (36.5 °C)  HR:  [74-84] 74  Resp:  [15-18] 15  BP: (105-130)/(68-70) 120/68  SpO2:  [92 %-96 %] 94 %  Body mass index is 27.51 kg/m².     Input and Output Summary (last 24 hours):     Intake/Output Summary (Last 24 hours) at 2024 [id number]  Last data filed at 2024 0414  Gross per 24 hour   Intake --   Output 300 ml   Net -300 ml       Physical Exam:   Physical Exam  Constitutional:       General: She is not in acute distress.     Appearance: She is not  ill-appearing.   HENT:      Head: Normocephalic and atraumatic.   Eyes:      Extraocular Movements: Extraocular movements intact.      Pupils: Pupils are equal, round, and reactive to light.   Cardiovascular:      Rate and Rhythm: Normal rate and regular rhythm.      Heart sounds: Normal heart sounds. No murmur heard.  Pulmonary:      Effort: Pulmonary effort is normal.      Breath sounds: Normal breath sounds.   Abdominal:      General: There is no distension.      Palpations: Abdomen is soft.      Tenderness: There is no abdominal tenderness. There is no right CVA tenderness, left CVA tenderness or guarding.   Musculoskeletal:      Right lower leg: No edema.      Left lower leg: No edema.   Skin:     Capillary Refill: Capillary refill takes less than 2 seconds.      Coloration: Skin is not jaundiced or pale.   Neurological:      General: No focal deficit present.      Mental Status: She is alert and oriented to person, place, and time.      Cranial Nerves: No cranial nerve deficit.      Sensory: No sensory deficit.      Motor: No weakness.      Comments: Slight resting tremor noted in lower extremities         Additional Data:     Labs:  Results from last 7 days   Lab Units 01/14/24  0417   WBC Thousand/uL 6.52   HEMOGLOBIN g/dL 11.9   HEMATOCRIT % 35.4   PLATELETS Thousands/uL 202   NEUTROS PCT % 72   LYMPHS PCT % 11*   MONOS PCT % 11   EOS PCT % 4     Results from last 7 days   Lab Units 01/14/24  1147 01/13/24  0959 01/12/24  2321   SODIUM mmol/L 129*   < > 129*   POTASSIUM mmol/L 3.4*   < > 3.5   CHLORIDE mmol/L 95*   < > 96   CO2 mmol/L 24   < > 20*   BUN mg/dL 13   < > 16   CREATININE mg/dL 0.72   < > 0.96   ANION GAP mmol/L 10   < > 13   CALCIUM mg/dL 8.9   < > 9.4   ALBUMIN g/dL  --   --  4.2   TOTAL BILIRUBIN mg/dL  --   --  1.50*   ALK PHOS U/L  --   --  83   ALT U/L  --   --  <3*   AST U/L  --   --  5*   GLUCOSE RANDOM mg/dL 93   < > 105    < > = values in this interval not displayed.     Results from  last 7 days   Lab Units 01/11/24  2046   INR  1.07             Results from last 7 days   Lab Units 01/13/24  0045   LACTIC ACID mmol/L 1.0       Lines/Drains:  Invasive Devices       Peripheral Intravenous Line  Duration             Peripheral IV 01/12/24 Right Antecubital 1 day    Peripheral IV 01/14/24 Left Hand <1 day                          Imaging: Reviewed radiology reports from this admission including: chest CT scan and ultrasound(s)    Recent Cultures (last 7 days):   Results from last 7 days   Lab Units 01/12/24  2320   URINE CULTURE  >100,000 cfu/ml       Last 24 Hours Medication List:   Current Facility-Administered Medications   Medication Dose Route Frequency Provider Last Rate    apixaban  10 mg Oral BID Darling Boston MD      Followed by    [START ON 1/19/2024] apixaban  5 mg Oral BID Darling Boston MD      carbidopa-levodopa  2.5 tablet Oral 4x Daily Onesimo Simon MD      cyclobenzaprine  5 mg Oral TID PRN Akhil Magallon MD      Diclofenac Sodium  2 g Topical TID Darling Boston MD      Diclofenac Sodium  2 g Topical 4x Daily PRN Darling Boston MD      ezetimibe  10 mg Oral HS Darling Boston MD      potassium chloride  10 mEq Oral Daily Darling Boston MD          Today, Patient Was Seen By: Akhil Magallon MD    **Please Note: This note may have been constructed using a voice recognition system.**

## 2024-01-15 PROBLEM — R56.9 SEIZURE-LIKE ACTIVITY (HCC): Chronic | Status: ACTIVE | Noted: 2024-01-14

## 2024-01-15 LAB
ANION GAP SERPL CALCULATED.3IONS-SCNC: 10 MMOL/L
BUN SERPL-MCNC: 12 MG/DL (ref 5–25)
CALCIUM SERPL-MCNC: 8.5 MG/DL (ref 8.4–10.2)
CHLORIDE SERPL-SCNC: 100 MMOL/L (ref 96–108)
CO2 SERPL-SCNC: 21 MMOL/L (ref 21–32)
CREAT SERPL-MCNC: 0.66 MG/DL (ref 0.6–1.3)
ERYTHROCYTE [DISTWIDTH] IN BLOOD BY AUTOMATED COUNT: 13.8 % (ref 11.6–15.1)
GFR SERPL CREATININE-BSD FRML MDRD: 86 ML/MIN/1.73SQ M
GLUCOSE SERPL-MCNC: 89 MG/DL (ref 65–140)
HCT VFR BLD AUTO: 32.7 % (ref 34.8–46.1)
HGB BLD-MCNC: 10.8 G/DL (ref 11.5–15.4)
MCH RBC QN AUTO: 27.7 PG (ref 26.8–34.3)
MCHC RBC AUTO-ENTMCNC: 33 G/DL (ref 31.4–37.4)
MCV RBC AUTO: 84 FL (ref 82–98)
PLATELET # BLD AUTO: 156 THOUSANDS/UL (ref 149–390)
PMV BLD AUTO: 11.1 FL (ref 8.9–12.7)
POTASSIUM SERPL-SCNC: 3.6 MMOL/L (ref 3.5–5.3)
RBC # BLD AUTO: 3.9 MILLION/UL (ref 3.81–5.12)
SODIUM SERPL-SCNC: 131 MMOL/L (ref 135–147)
WBC # BLD AUTO: 10.78 THOUSAND/UL (ref 4.31–10.16)

## 2024-01-15 PROCEDURE — 99232 SBSQ HOSP IP/OBS MODERATE 35: CPT | Performed by: STUDENT IN AN ORGANIZED HEALTH CARE EDUCATION/TRAINING PROGRAM

## 2024-01-15 PROCEDURE — 80048 BASIC METABOLIC PNL TOTAL CA: CPT

## 2024-01-15 PROCEDURE — 99232 SBSQ HOSP IP/OBS MODERATE 35: CPT | Performed by: INTERNAL MEDICINE

## 2024-01-15 PROCEDURE — 85027 COMPLETE CBC AUTOMATED: CPT

## 2024-01-15 RX ORDER — LORAZEPAM 2 MG/ML
0.5 INJECTION INTRAMUSCULAR
Status: DISCONTINUED | OUTPATIENT
Start: 2024-01-15 | End: 2024-01-16 | Stop reason: HOSPADM

## 2024-01-15 RX ADMIN — CARBIDOPA AND LEVODOPA 2.5 TABLET: 25; 100 TABLET ORAL at 08:36

## 2024-01-15 RX ADMIN — APIXABAN 10 MG: 5 TABLET, FILM COATED ORAL at 16:27

## 2024-01-15 RX ADMIN — POTASSIUM CHLORIDE 10 MEQ: 1.5 SOLUTION ORAL at 08:37

## 2024-01-15 RX ADMIN — EZETIMIBE 10 MG: 10 TABLET ORAL at 23:54

## 2024-01-15 RX ADMIN — CARBIDOPA AND LEVODOPA 2 TABLET: 25; 100 TABLET ORAL at 16:27

## 2024-01-15 RX ADMIN — LEVETIRACETAM 750 MG: 500 TABLET, FILM COATED ORAL at 23:54

## 2024-01-15 RX ADMIN — LEVETIRACETAM 750 MG: 500 TABLET, FILM COATED ORAL at 08:36

## 2024-01-15 RX ADMIN — APIXABAN 10 MG: 5 TABLET, FILM COATED ORAL at 08:36

## 2024-01-15 RX ADMIN — CARBIDOPA AND LEVODOPA 2 TABLET: 25; 100 TABLET ORAL at 13:43

## 2024-01-15 NOTE — QUICK NOTE
Spoke with patient and family at bedside. They were able to verify home dose of Sinemet  mg. Patient and family report that she takes the followin tablets 4 times daily at 0500, 0900, 1300, and 1700. Resumed home regimen per neurology recommendations.     Akhil Magallon MD

## 2024-01-15 NOTE — ASSESSMENT & PLAN NOTE
Assessment:  A rapid response was called on 1/14 for seizure-like activity and unresponsiveness. Generalized tonic-clonic and postictal tongue laceration.   Neurology were consulted.    CT and MRI head: No acute findings  Patient was loaded with 2000 mg Keppra  Today, Patient was AAO x 3. Responsive and talkative.     Plan:  Continue Keppra 750 mg twice daily per neurology recommendations  Pending EEG read  Neurochecks every 4 hours

## 2024-01-15 NOTE — PROGRESS NOTES
NEUROLOGY RESIDENCY PROGRESS NOTE     Name: Kamilla Pat   Age & Sex: 78 y.o. female   MRN: [id number]  Unit/Bed#: S -01   Encounter: [id number]    Recommendations for outpatient neurological follow up have yet to be determined.     Pending for discharge: MRI brain, routine EEG    ASSESSMENT & PLAN     Seizure-like activity (HCC)  Assessment & Plan  Assessment:  Patient is a 78-year-old female with a past medical history of hypertension, hyperlipidemia, Parkinson disease who was recently admitted for COVID infection complicated by pulmonary emboli currently on apixaban and readmitted for urine tract infection and hyponatremia General weakness, the presented with seizure-like activity consisting of episodic jaw jerking, unresponsiveness, and an episode of generalized tonic-clonic activity with apparent postictal state and tongue laceration concerning for elliptic seizure.     Impression:  Etiology of seizures currently unclear, may potentially be provoked or due to underlying CNS insult as episodes reported to be initially focal which may be demonstrating hemispheric dysfunction.      Plan:  - Case discussed with attending neurologist Dr. Galloway  - Continue ICU level of care  - Continue frequent neurologic checks  - Continue fall precaution, seizure precautions  - Recommend MRI brain with contrast  - Recommend routine EEG, if recurrent events/concern for subclinical activity, may require vEEG  - Continue levetiracetam 750 mg every 12 hours  - Recommend lorazepam 0.1 mg/kg for any event lasting more than 3 to 5 minutes or recurrent events without return to baseline in between  - Recommend noncontrast CT head for any acute changes status or neurologic examination felt not to be secondary to seizure activity.  - Appreciate management metabolic, infectious, inflammatory derangements per primary team  - DVT prophylaxis per primary team      SUBJECTIVE     Patient was seen and examined. No acute events  "overnight.  Patient reported no headache, chest pain, shortness of breath, abdominal pain, nausea, vomiting, diarrhea, constipation.  Patient was alert and oriented to person, place, time (year, day, not month), and event.  The patient was able to spontaneously move all extremities, engage in conversation, track provider, and follow instructions appropriately.  Patient reported no acute complaints or concerns during examination.  Patient reported \"it is too early for this\" during examination during strength testing however grossly neurologic examination appears to be intact.    Imaging reviewed:  - CT head without contrast demonstrated no acute intracranial hemorrhage mass effect or edema.  Chronic ventriculomegaly is similar to MRI brain from 2020.    - MRI brain without contrast demonstrated no acute infarction, intracranial hemorrhage or mass effect.  Minimal, chronic microangiopathy noted.    - Echocardiogram demonstrated left ventricular cavity size normal, wall thickness normal, ejection fraction 65%, systolic function normal, diastolic function is mildly abnormal consistent with diastolic dysfunction.  Right ventricular cavity size is mildly dilated, systolic function mildly reduced.  Right atrium is mildly dilated.    Review of Systems   Constitutional:  Positive for fatigue. Negative for chills.   HENT:  Negative for congestion, drooling, ear pain, sneezing and voice change.         Tongue laceration, blood products on teeth   Eyes:  Negative for pain, discharge and visual disturbance.   Respiratory:  Negative for cough, chest tightness and shortness of breath.    Cardiovascular:  Negative for chest pain and palpitations.   Gastrointestinal:  Negative for abdominal distention, blood in stool and diarrhea.   Genitourinary:  Negative for dysuria and hematuria.   Musculoskeletal:  Negative for neck pain and neck stiffness.   Skin:  Negative for color change.   Neurological:  Negative for dizziness, tremors, " facial asymmetry, speech difficulty, weakness, numbness and headaches.   Psychiatric/Behavioral:  Positive for decreased concentration. Negative for agitation, confusion, hallucinations, self-injury and suicidal ideas. The patient is nervous/anxious.      OBJECTIVE     Patient ID: Kamilla Pat is a 78 y.o. female.    Vitals:    24 2327 01/15/24 0500 01/15/24 0851   BP: 114/58 133/63 130/61 117/62   BP Location:  Left arm     Pulse: 84  85 87   Resp: (!) 33  (!) 24    Temp:  (!) 96.7 °F (35.9 °C)     TempSrc:  Axillary     SpO2: 99% 97% 94% 94%   Weight:       Height:          Temperature:   Temp (24hrs), Av.3 °F (36.3 °C), Min:96.7 °F (35.9 °C), Max:97.6 °F (36.4 °C)    Temperature: (!) 96.7 °F (35.9 °C)    Physical Exam  Vitals and nursing note reviewed.   Constitutional:       General: She is not in acute distress.     Appearance: She is well-developed. She is not toxic-appearing.   HENT:      Head: Normocephalic and atraumatic.      Mouth/Throat:      Mouth: Mucous membranes are moist.      Pharynx: Oropharyngeal exudate present.   Eyes:      Extraocular Movements: Extraocular movements intact and EOM normal.      Conjunctiva/sclera: Conjunctivae normal.      Pupils: Pupils are equal, round, and reactive to light.   Cardiovascular:      Rate and Rhythm: Normal rate and regular rhythm.   Pulmonary:      Effort: No respiratory distress.   Abdominal:      General: There is no distension.   Musculoskeletal:         General: Tenderness (Tenderness on left foot with reflex testing) present. No swelling.      Cervical back: Neck supple.      Right lower leg: No edema.      Left lower leg: No edema.   Skin:     General: Skin is warm and dry.      Capillary Refill: Capillary refill takes less than 2 seconds.   Neurological:      Mental Status: She is alert.      Coordination: Finger-Nose-Finger Test normal.      Deep Tendon Reflexes:      Reflex Scores:       Tricep reflexes are 2+ on the right  side and 2+ on the left side.       Bicep reflexes are 2+ on the right side and 2+ on the left side.       Brachioradialis reflexes are 2+ on the right side and 2+ on the left side.       Patellar reflexes are 0 on the right side and 0 on the left side.       Achilles reflexes are 2+ on the right side and 2+ on the left side.  Psychiatric:         Mood and Affect: Mood normal.         Speech: Speech normal.          Neurologic Exam     Mental Status   Oriented to person.   Oriented to place.   Disoriented to month. Oriented to year, date and day.   Follows 2 step commands.   Attention: decreased. Concentration: decreased.   Speech: speech is normal   Level of consciousness: alert  Able to perform simple calculations.   Able to name object. Able to repeat. Normal comprehension.     Cranial Nerves     CN II   Right visual field deficit: none  Left visual field deficit: none     CN III, IV, VI   Pupils are equal, round, and reactive to light.  Extraocular motions are normal.   Right pupil: Size: 3 mm. Shape: regular. Reactivity: brisk.   Left pupil: Size: 3 mm. Shape: regular. Reactivity: brisk.   CN III: no CN III palsy  CN VI: no CN VI palsy  Nystagmus: none   Diplopia: none  Ophthalmoparesis: none    CN V   Right facial sensation deficit: none  Left facial sensation deficit: none    CN VII   Right facial weakness: none  Left facial weakness: none    CN IX, X   Palate: symmetric    CN XI   Right sternocleidomastoid strength: normal  Left sternocleidomastoid strength: normal  Right trapezius strength: normal  Left trapezius strength: normal    CN XII   Tongue: not atrophic  Fasciculations: absent  Tongue deviation: none  Hearing grossly intact     Motor Exam   Muscle bulk: normal  Right arm tone: normal  Left arm tone: normal  Right arm pronator drift: absent  Left arm pronator drift: absent  Right leg tone: normal  Left leg tone: normal  Patient demonstrated grossly 4+/5 in all 4 extremities, examination limited by  "effort, patient stating \"it is too early for this\".     Sensory Exam   Right arm light touch: normal  Left arm light touch: normal  Right leg light touch: normal  Left leg light touch: normal    Gait, Coordination, and Reflexes     Coordination   Finger to nose coordination: normal    Tremor   Resting tremor: absent  Intention tremor: absent  Action tremor: absent    Reflexes   Right brachioradialis: 2+  Left brachioradialis: 2+  Right biceps: 2+  Left biceps: 2+  Right triceps: 2+  Left triceps: 2+  Right patellar: 0  Left patellar: 0  Right achilles: 2+  Left achilles: 2+  Right plantar: normal  Left plantar: normal  Right Cantu: absent  Left Cantu: absent  Right ankle clonus: absent  Left ankle clonus: absent     LABORATORY DATA     Labs: I have personally reviewed pertinent reports.    Results from last 7 days   Lab Units 01/15/24  0440 01/14/24  1602 01/14/24  0417 01/12/24  2321 01/12/24  0333   WBC Thousand/uL 10.78*  --  6.52 8.88 6.39   HEMOGLOBIN g/dL 10.8*  --  11.9 12.4 11.7   I STAT HEMOGLOBIN g/dl  --  11.6  --   --   --    HEMATOCRIT % 32.7*  --  35.4 37.7 35.2   HEMATOCRIT, ISTAT %  --  34*  --   --   --    PLATELETS Thousands/uL 156  --  202 201 216   NEUTROS PCT %  --   --  72 85* 75   MONOS PCT %  --   --  11 7 9   EOS PCT %  --   --  4 0 2      Results from last 7 days   Lab Units 01/15/24  0440 01/14/24  1602 01/14/24  1147 01/14/24 0417 01/13/24  0959 01/12/24  2321 01/12/24  0333 01/11/24  1013   SODIUM mmol/L 131*  --  129* 127*   < > 129*   < > 133*   POTASSIUM mmol/L 3.6  --  3.4* 3.5   < > 3.5   < > 3.0*   CHLORIDE mmol/L 100  --  95* 95*   < > 96   < > 97   CO2 mmol/L 21  --  24 21   < > 20*   < > 20*   CO2, I-STAT mmol/L  --  14*  --   --   --   --   --   --    BUN mg/dL 12  --  13 14   < > 16   < > 10   CREATININE mg/dL 0.66  --  0.72 0.71   < > 0.96   < > 0.82   CALCIUM mg/dL 8.5  --  8.9 8.8   < > 9.4   < > 9.4   ALK PHOS U/L  --   --   --   --   --  83  --  78   ALT U/L  --   -- "   --   --   --  <3*  --  <3*   AST U/L  --   --   --   --   --  5*  --  5*   GLUCOSE, ISTAT mg/dl  --  120  --   --   --   --   --   --     < > = values in this interval not displayed.     Results from last 7 days   Lab Units 01/12/24  2321   MAGNESIUM mg/dL 1.8*          Results from last 7 days   Lab Units 01/12/24  0333 01/11/24  2046 01/11/24  1311   INR   --  1.07 1.02   PTT seconds 81* 72* 26     Results from last 7 days   Lab Units 01/13/24  0045   LACTIC ACID mmol/L 1.0           IMAGING & DIAGNOSTIC TESTING     Radiology Results: I have personally reviewed pertinent reports.      MRI brain wo contrast   Final Result by London Gibbs MD (01/14 1807)         1. No acute infarction, intracranial hemorrhage or mass effect.   2. Minimal, chronic microangiopathy.      Workstation performed: [id number]         CT head wo contrast   Final Result by London Gibbs MD (01/14 1805)         1. No acute intracranial image, mass effect or edema.   2. Chronic ventriculomegaly is similar to the brain MRI from 2020                  Workstation performed: [id number]           Other Diagnostic Testing: I have personally reviewed pertinent reports.      ACTIVE MEDICATIONS     Current Facility-Administered Medications   Medication Dose Route Frequency    apixaban (ELIQUIS) tablet 10 mg  10 mg Oral BID    Followed by    [START ON 1/19/2024] apixaban (ELIQUIS) tablet 5 mg  5 mg Oral BID    carbidopa-levodopa (SINEMET)  mg per tablet 2.5 tablet  2.5 tablet Oral 4x Daily    Diclofenac Sodium (VOLTAREN) 1 % topical gel 2 g  2 g Topical TID    Diclofenac Sodium (VOLTAREN) 1 % topical gel 2 g  2 g Topical 4x Daily PRN    ezetimibe (ZETIA) tablet 10 mg  10 mg Oral HS    levETIRAcetam (KEPPRA) tablet 750 mg  750 mg Oral Q12H CESAR    potassium chloride oral solution 10 mEq  10 mEq Oral Daily     Prior to Admission medications    Medication Sig Start Date End Date Taking? Authorizing Provider   apixaban (Eliquis) 5 mg  Take 2 tablets (10 mg total) by mouth 2 (two) times a day for 7 days, THEN 1 tablet (5 mg total) 2 (two) times a day for 21 days. 1/12/24 2/9/24 Yes Jonathan Jenkins MD   carbidopa-levodopa (SINEMET)  mg per tablet Take 2.5 tablets by mouth 4 (four) times a day Take one tablet 3 times a day and then take 1.5 tablets at bedtime. 1/12/24 2/11/24 Yes Monica Rojas DO   diclofenac sodium (VOLTAREN) 1 % APPLY 2 GM TO AFFECTED AREA THREE TIMES A DAY AS NEEDED. 4/1/20  Yes Historical Provider, MD   Diclofenac Sodium (VOLTAREN) 1 % Apply 2 g topically 4 (four) times a day 8/15/23  Yes Gloria Christianson MD   ezetimibe (ZETIA) 10 mg tablet Take by mouth Daily 4/1/19  Yes Historical Provider, MD   potassium chloride (MICRO-K) 10 MEQ CR capsule Take 1 capsule by mouth Daily 4/1/19  Yes Historical Provider, MD   triamterene-hydrochlorothiazide (DYAZIDE) 37.5-25 mg per capsule Take 1 capsule by mouth Daily 4/1/19  Yes Historical Provider, MD   acetaminophen (TYLENOL) 325 mg tablet Take 325 mg by mouth every 6 (six) hours as needed for mild pain    Historical Provider, MD   Calcium Carbonate-Vitamin D (CALCIUM-CARB 600 + D PO) Take 1 tablet by mouth daily    Historical Provider, MD   Cholecalciferol 50 MCG (2000 UT) CAPS Take 1 capsule by mouth daily    Historical Provider, MD     VTE Pharmacologic Prophylaxis: Per primary team  VTE Mechanical Prophylaxis: Per primary team    ======    I have discussed the patient's history, physical exam findings, assessment, and plan in detail with attending, Dr. aGlloway    Thank you for allowing me to participate in the care of your patient, Kamilla Pat.    Keaton Richards, DO  [facility] Neurology Residency, PGY-3

## 2024-01-15 NOTE — ASSESSMENT & PLAN NOTE
Continue Sinemet 2 tablets at 5 AM, 2 tablets at 9 AM, 2 tablets at 1 PM, and 2 tablets at 5 PM at home

## 2024-01-15 NOTE — PROGRESS NOTES
[facility]  Progress Note  Name: Kamilla Pat I  MRN: [id number]  Unit/Bed#: S -01 I Date of Admission: 1/12/2024   Date of Service: 1/15/2024 I Hospital Day: 2    Assessment/Plan   * Ambulatory dysfunction  Assessment & Plan  Assessment:  Patient felt strongly shaky with generalized weakness on 1/13 one day after being discharged.  No focal deficits concerning for a bleed given new anticoagulation.    Urinalysis appears to had signs of a UTI and patient received a dose of Rocephin in the ED    Results from last 7 days   Lab Units 01/12/24  2320   LEUKOCYTES UA  Small*   NITRITE UA  Positive*   GLUCOSE UA mg/dl Negative   KETONES UA mg/dl 15 (1+)*   BLOOD UA  Negative   WBC UA /hpf 20-30*   RBC UA /hpf 4-10*   BACTERIA UA /hpf None Seen       Plan:  UA findings is probably due to asymptomatic bacteriuria  Hold off on antibiotics  PT and OT    Multiple subsegmental pulmonary emboli without acute cor pulmonale (HCC)  Assessment & Plan  Assessment:  COVID-19 positive on 12/19/2023 received Paxlovid.   CTA chest 1/11: multiple bilateral subsegmental pulmonary embolism. Was transitioned from heparin to Eliquis prior to discharge home on 1/12    Plan:  Continue Eliquis 10 mg BID for 6 months  Follow-up echocardiogram 3 to 6 months    Parkinson disease  Assessment & Plan  Verified with family this afternoon, patient takes Sinemet 2 tablets at 5 AM, 2 tablets at 9 AM, 2 tablets at 1 PM, and 2 tablets at 5 PM  Will follow up with Neurology.      Seizure-like activity (HCC)  Assessment & Plan  Assessment:  A rapid response was called on 1/14 for seizure-like activity and unresponsiveness. Generalized tonic-clonic and postictal tongue laceration.   Neurology were consulted.    CT and MRI head: No acute findings  Patient was loaded with 2000 mg Keppra  Today, Patient was AAO x 3. Responsive and talkative.     Plan:  Continue Keppra 750 mg twice daily per neurology recommendations  Pending  EEG read  Neurochecks every 4 hours    Hyponatremia  Assessment & Plan  Recent Labs     24  0417 24  1147 01/15/24  0440   SODIUM 127* 129* 131*     Assessment:   On admission, sodium 129  Patient is asymptomatic and has already received 1 L normal saline bolus.    Sodium today 131    Plan:  Continue to Monitor     Pure hypercholesterolemia  Assessment & Plan  Continue home Zetia         VTE Pharmacologic Prophylaxis: VTE Score: 7 Moderate Risk (Score 3-4) - Pharmacological DVT Prophylaxis Ordered: apixaban (Eliquis).    Mobility:   Basic Mobility Inpatient Raw Score: 20  -HLM Goal: 6: Walk 10 steps or more  JH-HLM Achieved: 2: Bed activities/Dependent transfer  HLM Goal NOT achieved. Continue with multidisciplinary rounding and encourage appropriate mobility to improve upon HLM goals.    Patient Centered Rounds: I performed bedside rounds with nursing staff today.  Discussions with Specialists or Other Care Team Provider: Nursing, Neurology    Education and Discussions with Family / Patient: Updated  () at bedside.    Current Length of Stay: 2 day(s)  Current Patient Status: Inpatient   Discharge Plan: Anticipate discharge in 24-48 hrs to Waiting PT/OT evaluation    Code Status: Level 1 - Full Code    Subjective:   Patient denied any complaints. No headache or dizziness. No abdominal pain, nausea, or vomiting.  No chest pain, shortness of breath.     Objective:   Patient was seen and examined this morning. She was laying comfortably in bed not in pain or discomfort.  AAO x 2. Upon touching her right foot, she complained of lateral right foot tenderness.    Vitals:   Temp (24hrs), Av.6 °F (36.4 °C), Min:96.7 °F (35.9 °C), Max:98.4 °F (36.9 °C)    Temp:  [96.7 °F (35.9 °C)-98.4 °F (36.9 °C)] 98.4 °F (36.9 °C)  HR:  [] 101  Resp:  [18-33] 18  BP: (113-190)/(55-98) 123/70  SpO2:  [94 %-99 %] 95 %  Body mass index is 28.68 kg/m².     Input and Output Summary (last 24 hours):    No intake or output data in the 24 hours ending 01/15/24 1534    Physical Exam:   Physical Exam  Constitutional:       General: She is not in acute distress.  HENT:      Head: Normocephalic and atraumatic.      Nose: Nose normal.      Mouth/Throat:      Mouth: Mucous membranes are moist.   Eyes:      Extraocular Movements: Extraocular movements intact.      Pupils: Pupils are equal, round, and reactive to light.   Cardiovascular:      Rate and Rhythm: Normal rate and regular rhythm.      Pulses: Normal pulses.      Heart sounds: Normal heart sounds.   Pulmonary:      Effort: Pulmonary effort is normal.      Breath sounds: Normal breath sounds.   Abdominal:      General: Abdomen is flat.      Tenderness: There is no abdominal tenderness.   Musculoskeletal:      Right lower leg: No edema.      Left lower leg: No edema.      Right foot: Tenderness present.   Neurological:      Mental Status: She is alert and oriented to person, place, and time.      Cranial Nerves: Cranial nerves 2-12 are intact.      Sensory: Sensation is intact.      Motor: Motor function is intact.      Coordination: Coordination is intact.   Psychiatric:         Behavior: Behavior is cooperative.         Additional Data:     Labs:  Results from last 7 days   Lab Units 01/15/24  0440 01/14/24  1602 01/14/24  0417   WBC Thousand/uL 10.78*  --  6.52   HEMOGLOBIN g/dL 10.8*  --  11.9   I STAT HEMOGLOBIN   --    < >  --    HEMATOCRIT % 32.7*  --  35.4   HEMATOCRIT, ISTAT   --    < >  --    PLATELETS Thousands/uL 156  --  202   NEUTROS PCT %  --   --  72   LYMPHS PCT %  --   --  11*   MONOS PCT %  --   --  11   EOS PCT %  --   --  4    < > = values in this interval not displayed.     Results from last 7 days   Lab Units 01/15/24  0440 01/13/24  0959 01/12/24  2321   SODIUM mmol/L 131*   < > 129*   POTASSIUM mmol/L 3.6   < > 3.5   CHLORIDE mmol/L 100   < > 96   CO2 mmol/L 21   < > 20*   CO2, I-STAT   --    < >  --    BUN mg/dL 12   < > 16   CREATININE  mg/dL 0.66   < > 0.96   ANION GAP mmol/L 10   < > 13   CALCIUM mg/dL 8.5   < > 9.4   ALBUMIN g/dL  --   --  4.2   TOTAL BILIRUBIN mg/dL  --   --  1.50*   ALK PHOS U/L  --   --  83   ALT U/L  --   --  <3*   AST U/L  --   --  5*   GLUCOSE RANDOM mg/dL 89   < > 105    < > = values in this interval not displayed.     Results from last 7 days   Lab Units 01/11/24  2046   INR  1.07     Results from last 7 days   Lab Units 01/14/24  1551   POC GLUCOSE mg/dl 102         Results from last 7 days   Lab Units 01/13/24  0045   LACTIC ACID mmol/L 1.0       Lines/Drains:  Invasive Devices       Peripheral Intravenous Line  Duration             Peripheral IV 01/12/24 Right Antecubital 2 days    Peripheral IV 01/14/24 Left Hand 1 day                          Imaging: Reviewed radiology reports from this admission including: CT head and MRI brain    Recent Cultures (last 7 days):   Results from last 7 days   Lab Units 01/12/24  2320   URINE CULTURE  >100,000 cfu/ml       Last 24 Hours Medication List:   Current Facility-Administered Medications   Medication Dose Route Frequency Provider Last Rate    apixaban  10 mg Oral BID Akhil Magallon MD      Followed by    [START ON 1/19/2024] apixaban  5 mg Oral BID Akhil Magallon MD      carbidopa-levodopa  2 tablet Oral 4x Daily Akhil Magallon MD      Diclofenac Sodium  2 g Topical TID Akhil Magallon MD      Diclofenac Sodium  2 g Topical 4x Daily PRN Akhil Magallon MD      ezetimibe  10 mg Oral HS Akhil Magallon MD      levETIRAcetam  750 mg Oral Q12H CESAR Akhil Magallon MD      potassium chloride  10 mEq Oral Daily Akhil Magallon MD        Nutrition Assessment and Intervention:     Online resources such as NutritionFacts.org, ForksOverKnives.com, plantstrong.com, pcrm.org, or similar provided to patient       Today, Patient Was Seen By: Faina Urrutia MD    **Please Note: This note may have been constructed using a voice recognition system.**

## 2024-01-15 NOTE — PLAN OF CARE
"  Problem: NEUROSENSORY - ADULT  Goal: Remains free of injury related to seizures activity  Description: INTERVENTIONS  - Maintain airway, patient safety  and administer oxygen as ordered  - Monitor patient for seizure activity, document and report duration and description of seizure to physician/advanced practitioner  - If seizure occurs,  ensure patient safety during seizure  - Reorient patient post seizure  - Seizure pads on all 4 side rails  - Instruct patient/family to notify RN of any seizure activity including if an aura is experienced  - Instruct patient/family to call for assistance with activity based on nursing assessment  - Administer anti-seizure medications if ordered    Outcome: Progressing       0400  Patient continues to remove monitoring devices and pull at Ivs. Frequently reeducated on necessity of above. Patient answers orientation questions appropriately. Patient also reeducated on use of call bell system for bathroom needs. Continues to continually void in the bed, before during and after bed changes, refusing to call when having urge to void stating \"I'm peeing and I can't tell you why\". Seizure precautions maintained. See flowsheets for assessments.   "

## 2024-01-15 NOTE — ASSESSMENT & PLAN NOTE
Recent Labs     01/14/24  0417 01/14/24  1147 01/15/24  0440   SODIUM 127* 129* 131*     Assessment:   On admission, sodium 129  Patient is asymptomatic and has already received 1 L normal saline bolus.    Sodium today 131    Plan:  Continue to Monitor

## 2024-01-15 NOTE — ASSESSMENT & PLAN NOTE
Assessment:  Patient felt strongly shaky with generalized weakness on 1/13 one day after being discharged.  No focal deficits concerning for a bleed given new anticoagulation.    Urinalysis appears to had signs of a UTI and patient received a dose of Rocephin in the ED    Results from last 7 days   Lab Units 01/12/24  2320   LEUKOCYTES UA  Small*   NITRITE UA  Positive*   GLUCOSE UA mg/dl Negative   KETONES UA mg/dl 15 (1+)*   BLOOD UA  Negative   WBC UA /hpf 20-30*   RBC UA /hpf 4-10*   BACTERIA UA /hpf None Seen       Plan:  UA findings is probably due to asymptomatic bacteriuria  Hold off on antibiotics  PT and OT

## 2024-01-15 NOTE — PLAN OF CARE
Problem: NEUROSENSORY - ADULT  Goal: Achieves stable or improved neurological status  Description: INTERVENTIONS  - Monitor and report changes in neurological status  - Monitor vital signs such as temperature, blood pressure, glucose, and any other labs ordered   - Initiate measures to prevent increased intracranial pressure  - Monitor for seizure activity and implement precautions if appropriate      Outcome: Progressing  Goal: Remains free of injury related to seizures activity  Description: INTERVENTIONS  - Maintain airway, patient safety  and administer oxygen as ordered  - Monitor patient for seizure activity, document and report duration and description of seizure to physician/advanced practitioner  - If seizure occurs,  ensure patient safety during seizure  - Reorient patient post seizure  - Seizure pads on all 4 side rails  - Instruct patient/family to notify RN of any seizure activity including if an aura is experienced  - Instruct patient/family to call for assistance with activity based on nursing assessment  - Administer anti-seizure medications if ordered    Outcome: Progressing  Goal: Achieves maximal functionality and self care  Description: INTERVENTIONS  - Monitor swallowing and airway patency with patient fatigue and changes in neurological status  - Encourage and assist patient to increase activity and self care.   - Encourage visually impaired, hearing impaired and aphasic patients to use assistive/communication devices  Outcome: Progressing     Problem: SAFETY ADULT  Goal: Patient will remain free of falls  Description: INTERVENTIONS:  - Educate patient/family on patient safety including physical limitations  - Instruct patient to call for assistance with activity   - Consult OT/PT to assist with strengthening/mobility   - Keep Call bell within reach  - Keep bed low and locked with side rails adjusted as appropriate  - Keep care items and personal belongings within reach  - Initiate and  maintain comfort rounds  - Make Fall Risk Sign visible to staff  - Apply yellow socks and bracelet for high fall risk patients  - Consider moving patient to room near nurses station  Outcome: Progressing     Problem: Potential for Falls  Goal: Patient will remain free of falls  Description: INTERVENTIONS:  - Educate patient/family on patient safety including physical limitations  - Instruct patient to call for assistance with activity   - Consult OT/PT to assist with strengthening/mobility   - Keep Call bell within reach  - Keep bed low and locked with side rails adjusted as appropriate  - Keep care items and personal belongings within reach  - Initiate and maintain comfort rounds  - Make Fall Risk Sign visible to staff  - Apply yellow socks and bracelet for high fall risk patients  - Consider moving patient to room near nurses station  Outcome: Progressing     Problem: SKIN/TISSUE INTEGRITY - ADULT  Goal: Skin Integrity remains intact(Skin Breakdown Prevention)  Description: Assess:  -Inspect skin when repositioning, toileting, and assisting with ADLS  -Assess extremities for adequate circulation and sensation     Bed Management:  -Have minimal linens on bed & keep smooth, unwrinkled  -Change linens as needed when moist or perspiring    Toileting:  -Offer bedside commode    Activity:  -Encourage activity and walks on unit  -Encourage or provide ROM exercises   -Use appropriate equipment to lift or move patient in bed  Skin Care:  -Avoid use of baby powder, tape, friction and shearing, hot water or constrictive clothing  -Do not massage red bony areas  Outcome: Progressing

## 2024-01-15 NOTE — ASSESSMENT & PLAN NOTE
Assessment:  COVID-19 positive on 12/19/2023 received Paxlovid.   CTA chest 1/11: multiple bilateral subsegmental pulmonary embolism. Was transitioned from heparin to Eliquis prior to discharge home on 1/12    Plan:  Continue Eliquis 10 mg BID for 6 months  Follow-up echocardiogram 3 to 6 months

## 2024-01-15 NOTE — ASSESSMENT & PLAN NOTE
Assessment:  A rapid response was called on 1/14 for seizure-like activity and unresponsiveness. Generalized tonic-clonic and postictal tongue laceration.   Neurology were consulted.    CT and MRI head: No acute findings  Patient was loaded with 2000 mg Keppra  Today, Patient was AAO x 4. Responsive and talkative.   EEG: Generalized irregular delta activity    Plan:  Continue Keppra 750 mg twice daily per neurology recommendations at home  Follow up with Neurology within 4-6 weeks after discharge  MRI brain with contrast will be done outpatient

## 2024-01-16 ENCOUNTER — HOME HEALTH ADMISSION (OUTPATIENT)
Dept: HOME HEALTH SERVICES | Facility: HOME HEALTHCARE | Age: 79
End: 2024-01-16
Payer: MEDICARE

## 2024-01-16 ENCOUNTER — APPOINTMENT (INPATIENT)
Dept: NEUROLOGY | Facility: HOSPITAL | Age: 79
DRG: 640 | End: 2024-01-16
Payer: MEDICARE

## 2024-01-16 VITALS
OXYGEN SATURATION: 93 % | SYSTOLIC BLOOD PRESSURE: 92 MMHG | HEART RATE: 85 BPM | WEIGHT: 167.11 LBS | RESPIRATION RATE: 18 BRPM | HEIGHT: 64 IN | BODY MASS INDEX: 28.53 KG/M2 | TEMPERATURE: 98.4 F | DIASTOLIC BLOOD PRESSURE: 57 MMHG

## 2024-01-16 PROBLEM — R53.1 GENERALIZED WEAKNESS: Status: ACTIVE | Noted: 2024-01-16

## 2024-01-16 PROBLEM — G20.A1 PARKINSON DISEASE: Chronic | Status: ACTIVE | Noted: 2020-05-26

## 2024-01-16 LAB
ANION GAP SERPL CALCULATED.3IONS-SCNC: 11 MMOL/L
BUN SERPL-MCNC: 14 MG/DL (ref 5–25)
CALCIUM SERPL-MCNC: 8.6 MG/DL (ref 8.4–10.2)
CHLORIDE SERPL-SCNC: 99 MMOL/L (ref 96–108)
CO2 SERPL-SCNC: 21 MMOL/L (ref 21–32)
CREAT SERPL-MCNC: 0.7 MG/DL (ref 0.6–1.3)
GFR SERPL CREATININE-BSD FRML MDRD: 84 ML/MIN/1.73SQ M
GLUCOSE SERPL-MCNC: 95 MG/DL (ref 65–140)
POTASSIUM SERPL-SCNC: 3.7 MMOL/L (ref 3.5–5.3)
SODIUM SERPL-SCNC: 131 MMOL/L (ref 135–147)

## 2024-01-16 PROCEDURE — 95816 EEG AWAKE AND DROWSY: CPT

## 2024-01-16 PROCEDURE — 95819 EEG AWAKE AND ASLEEP: CPT | Performed by: STUDENT IN AN ORGANIZED HEALTH CARE EDUCATION/TRAINING PROGRAM

## 2024-01-16 PROCEDURE — 80048 BASIC METABOLIC PNL TOTAL CA: CPT

## 2024-01-16 PROCEDURE — 99238 HOSP IP/OBS DSCHRG MGMT 30/<: CPT | Performed by: HOSPITALIST

## 2024-01-16 PROCEDURE — 99232 SBSQ HOSP IP/OBS MODERATE 35: CPT | Performed by: STUDENT IN AN ORGANIZED HEALTH CARE EDUCATION/TRAINING PROGRAM

## 2024-01-16 RX ORDER — LEVETIRACETAM 750 MG/1
750 TABLET ORAL EVERY 12 HOURS SCHEDULED
Qty: 60 TABLET | Refills: 0 | Status: SHIPPED | OUTPATIENT
Start: 2024-01-16 | End: 2024-02-15

## 2024-01-16 RX ADMIN — LEVETIRACETAM 750 MG: 500 TABLET, FILM COATED ORAL at 08:44

## 2024-01-16 RX ADMIN — CARBIDOPA AND LEVODOPA 2 TABLET: 25; 100 TABLET ORAL at 14:03

## 2024-01-16 RX ADMIN — CARBIDOPA AND LEVODOPA 2 TABLET: 25; 100 TABLET ORAL at 05:48

## 2024-01-16 RX ADMIN — POTASSIUM CHLORIDE 10 MEQ: 1.5 SOLUTION ORAL at 08:44

## 2024-01-16 RX ADMIN — APIXABAN 10 MG: 5 TABLET, FILM COATED ORAL at 08:44

## 2024-01-16 RX ADMIN — CARBIDOPA AND LEVODOPA 2 TABLET: 25; 100 TABLET ORAL at 08:44

## 2024-01-16 NOTE — OCCUPATIONAL THERAPY NOTE
Occupational Therapy Cancellation Note        Patient Name: Kamilla Pat  Today's Date: 1/16/2024 01/16/24 0805   Note Type   Note type Cancelled Session   Cancel Reasons Patient off floor/test   Additional Comments Pt with EEG at bedside, will hold OT eval at this time, will continue to follow and see as appropriate/able     Clover Lopez MS, OTR/L

## 2024-01-16 NOTE — PHYSICAL THERAPY NOTE
PHYSICAL THERAPY CANCELLATION NOTE          Patient Name: Kamilla Pat  Today's Date: 1/16/2024 01/16/24 0810   Note Type   Note type Cancelled Session   Cancel Reasons Patient off floor/test   Additional Comments PT eval orders received, chart review performed. Attempted to see pt for PT eval; however, EEG at bedside. PT will continue to follow pt to provide PT intervention and DC recommendation as appropriate and as schedule allows.         Deborah Puri, PT, DPT  01/16/24

## 2024-01-16 NOTE — DISCHARGE SUMMARY
[facility]  Discharge- Kamilla Pat 1945, 78 y.o. female MRN: [id number]  Unit/Bed#: S -01 Encounter: [id number]  Primary Care Provider: Olivia Blackwood MD   Date and time admitted to hospital: 1/12/2024 10:20 PM    * Ambulatory dysfunction  Assessment & Plan  Assessment:  Patient felt strongly shaky with generalized weakness on 1/13 one day after being discharged.  No focal deficits concerning for a bleed given new anticoagulation.    Urinalysis appears to had signs of a UTI and patient received a dose of Rocephin in the ED    Results from last 7 days   Lab Units 01/12/24  2320   LEUKOCYTES UA  Small*   NITRITE UA  Positive*   GLUCOSE UA mg/dl Negative   KETONES UA mg/dl 15 (1+)*   BLOOD UA  Negative   WBC UA /hpf 20-30*   RBC UA /hpf 4-10*   BACTERIA UA /hpf None Seen       Plan:  UA findings is probably due to asymptomatic bacteriuria  Hold off on antibiotics  PT and OT    Multiple subsegmental pulmonary emboli without acute cor pulmonale (HCC)  Assessment & Plan  Assessment:  COVID-19 positive on 12/19/2023 received Paxlovid.   CTA chest 1/11: multiple bilateral subsegmental pulmonary embolism. Was transitioned from heparin to Eliquis prior to discharge home on 1/12    Plan:  Continue Eliquis 10 mg BID for 6 months  Follow-up echocardiogram 3 to 6 months    Parkinson disease  Assessment & Plan  Verified with family this afternoon, patient takes Sinemet 2 tablets at 5 AM, 2 tablets at 9 AM, 2 tablets at 1 PM, and 2 tablets at 5 PM  Will follow up with Neurology.      Seizure-like activity (HCC)  Assessment & Plan  Assessment:  A rapid response was called on 1/14 for seizure-like activity and unresponsiveness. Generalized tonic-clonic and postictal tongue laceration.   Neurology were consulted.    CT and MRI head: No acute findings  Patient was loaded with 2000 mg Keppra  Today, Patient was AAO x 3. Responsive and talkative.     Plan:  Continue Keppra 750 mg twice daily  per neurology recommendations  Pending EEG read  Neurochecks every 4 hours    Hyponatremia  Assessment & Plan  Recent Labs     01/14/24  0417 01/14/24  1147 01/15/24  0440   SODIUM 127* 129* 131*     Assessment:   On admission, sodium 129  Patient is asymptomatic and has already received 1 L normal saline bolus.    Sodium today 131    Plan:  Continue to Monitor     Pure hypercholesterolemia  Assessment & Plan  Continue home Zetia      Medical Problems       Resolved Problems  Date Reviewed: 1/13/2024   None       Discharging Resident: Faina Urrutia MD  Discharging Attending: No att. providers found  PCP: Olivia Blackwood MD  Admission Date:   Admission Orders (From admission, onward)       Ordered        01/13/24 0209  INPATIENT ADMISSION  Once                          Discharge Date: 01/16/24    Consultations During Hospital Stay:  Neurology     Procedures Performed:   EEG    Significant Findings / Test Results:       Incidental Findings:   Abnormal EEG  Hyponatremia   I reviewed the above mentioned incidental findings with the patient and/or family and they expressed understanding.    Test Results Pending at Discharge (will require follow up):  None     Outpatient Tests Requested:  MRI brain with contrast     Complications:  None    Reason for Admission: Lower extremities weakness.      Hospital Course:   Kamilla Pat is a 78 y.o. female patient with PMHx of Parkinson's, CKD, PE who originally presented to the hospital on 1/12/2024 due to lower extremities weakness. Patient presented with complaints of lower extremities shakiness 1 day after being discharged secondary to the PE.  Reported generalized weakness. On admission, she was found to have low magnesium and low sodium. Magnesium was repleted.  She was also found to have asymptomatic bacteriuria according to her urine microscopy findings.  On second day of admission, a rapid response was called and patient had a witnessed seizure with her eyes rolling up.   "She was unresponsive and in postictal state and tongue laceration was noted.  Neurology were consulted and patient was loaded with Keppra. CT and MRI of the head were unremarkable. EEG was abnormal.  Her lower extremity shakiness seems to be an appropriate Sinemet dosage which was verified with the family for proper dosage on discharge. Per neurology recommendations, patient will continue Keppra twice daily on discharge and will follow-up with neurology for MRI brain with contrast.  Sodium was stable on discharge. Upon family concerns patient was offered to be evaluated by PT/OT before discharge, but she refused and preferred to go home as she does not prefer to go to a rehab.  She was advised to follow-up with her primary care provider within 1 week of discharge as well as neurology within 4 to 6 weeks of discharge.    Please see above list of diagnoses and related plan for additional information.     Condition at Discharge: stable    Discharge Day Visit / Exam:   Subjective: Patient denied any pain or complaints. No chest pain or cough. No abdominal pain, nausea, or vomiting. No headache or dizziness. No lower extremity shakiness.    Vitals: Blood Pressure: 92/57 (01/16/24 1501)  Pulse: 85 (01/16/24 1501)  Temperature: 98.4 °F (36.9 °C) (01/16/24 1501)  Temp Source: Axillary (01/15/24 1517)  Respirations: 18 (01/16/24 1501)  Height: 5' 4\" (162.6 cm) (01/13/24 0642)  Weight - Scale: 75.8 kg (167 lb 1.7 oz) (01/14/24 1731)  SpO2: 93 % (01/16/24 1501)  Exam:   Physical Exam  Constitutional:       General: She is not in acute distress.  HENT:      Head: Normocephalic and atraumatic.      Nose: Nose normal.      Mouth/Throat:      Mouth: Mucous membranes are moist.   Eyes:      Extraocular Movements: Extraocular movements intact.      Pupils: Pupils are equal, round, and reactive to light.   Cardiovascular:      Rate and Rhythm: Normal rate and regular rhythm.      Pulses: Normal pulses.      Heart sounds: Normal heart " sounds.   Pulmonary:      Effort: Pulmonary effort is normal.      Breath sounds: Normal breath sounds.   Abdominal:      General: Abdomen is flat.      Tenderness: There is no abdominal tenderness.   Musculoskeletal:      Right lower leg: No edema.      Left lower leg: No edema.   Skin:     General: Skin is warm.   Neurological:      Mental Status: She is alert and oriented to person, place, and time.      Cranial Nerves: Cranial nerves 2-12 are intact.      Sensory: Sensation is intact.      Motor: Motor function is intact.   Psychiatric:         Behavior: Behavior is cooperative.         Discussion with Family: Updated  () at bedside.    Discharge instructions/Information to patient and family:   See after visit summary for information provided to patient and family.      Provisions for Follow-Up Care:  See after visit summary for information related to follow-up care and any pertinent home health orders.      Mobility at time of Discharge:   Basic Mobility Inpatient Raw Score: 18  JH-HLM Goal: 6: Walk 10 steps or more  JH-HLM Achieved: 2: Bed activities/Dependent transfer  HLM Goal achieved. Continue to encourage appropriate mobility.     Disposition:   Home    Planned Readmission: Not at this time     Discharge Medications:  See after visit summary for reconciled discharge medications provided to patient and/or family.          Physical Activity Assessment and Intervention:    Exercise capacity assessment reviewed       **Please Note: This note may have been constructed using a voice recognition system**

## 2024-01-16 NOTE — ED ATTENDING ATTESTATION
1/12/2024  IConnie MD, saw and evaluated the patient. I have discussed the patient with the resident/non-physician practitioner and agree with the resident's/non-physician practitioner's findings, Plan of Care, and MDM as documented in the resident's/non-physician practitioner's note, except where noted. All available labs and Radiology studies were reviewed.  I was present for key portions of any procedure(s) performed by the resident/non-physician practitioner and I was immediately available to provide assistance.       At this point I agree with the current assessment done in the Emergency Department.  I have conducted an independent evaluation of this patient a history and physical is as follows:    ED Course  ED Course as of 01/1945   Fri Jan 12, 2024   2311 78-year-old female presenting to the emergency department with generalized weakness.  Her past medical history is significant for Parkinson's disease.  Since 12/19/2023, patient has had worsening weakness and ambulatory dysfunction ever since she developed a COVID infection.  She has had increasing weakness, difficulty walking, and tremors since.  She has been following up with neurology.  She presented to [facility] ED yesterday for shortness of breath, found to have a PE, placed on Eliquis, observed over 24 hours, then discharged approximately 30 minutes prior to arrival in the emergency department.  Patient did not make it home due to extreme weakness and difficulty walking.    vital signs on arrival are within normal limits.  , Pleasant woman, shaky, with bilateral ankle clonus, difficulty ambulation --attempted to walk patient, abdominal tenderness on palpation generally.    Clinical presentation puts patient at risk for the following differential diagnoses:    Progression of her Parkinson's, electrolyte dysfunction, KATHERINE/dehydration, sepsis.  Lab work and imaging was ordered.       Sat Jan 13, 2024   0020 Magnesium(!): 1.8   0020 Sodium(!):  129   0021 CO2(!): 20   0021 Anion Gap: 13   0146 Leukocytes, UA(!): Small   0146 Nitrite, UA(!): Positive   0146 Ketones, UA(!): 15 (1+)   0148 pH, Sami: 7.380   0148 pCO2, Sami(!): 36.5   0148 pO2, Sami(!): 29.4   0148 HCO3, Sami(!): 21.1   0230 Review of patient's labs overall note hypomagnesemia, anion gap metabolic acidemia with respiratory compensation, steadily declining hyponatremia, and a possible urinary tract infection, possibly progressing parkinsonism leading to difficult ambulation.  Case was discussed with general medicine for admission.  Urine culture and is pending at this time.         Critical Care Time  Procedures

## 2024-01-16 NOTE — PROGRESS NOTES
Patient:    MRN:  [id number]    Carin Request ID:  [id number]    Level of care reserved:  Home Health Agency    Partner Reserved:  [facility], [place] [address] [phone]    Clinical needs requested:    Geography searched:  [phone]    Start of Service:    Request sent:  4:06pm EST on 1/16/2024 by Radha Willis    Partner reserved:  4:13pm EST on 1/16/2024 by Radha Willis    Choice list shared:

## 2024-01-16 NOTE — DISCHARGE INSTR - AVS FIRST PAGE
Dear Kamilla Pat,     It was our pleasure to care for you here at [facility].  It is our hope that we were always able to exceed the expected standards for your care during your stay.  You were hospitalized due to lower extremities shaking.  You were cared for on the second floor by Faina Urrutia MD under the service of Dale Crespo MD with the [facility] who covers for your primary care physician (PCP), Olivia Blackwood MD, while you were hospitalized.  If you have any questions or concerns related to this hospitalization, you may contact us at .  For follow up as well as any medication refills, we recommend that you follow up with your primary care physician.  A registered nurse will reach out to you by phone within a few days after your discharge to answer any additional questions that you may have after going home.  However, at this time we provide for you here, the most important instructions / recommendations at discharge:     Notable Medication Adjustments -   START taking Keppra 750 mg twice daily  Continue taking Sinemet 2 tablets at 5 AM, 9 AM, 1 PM, 5 PM  No other medications have been made  Testing Required after Discharge -   MRI brain with contrast   Important follow up information -   Please follow-up with neurology within 4 to 6 weeks of discharge  Please follow-up with your primary care provider within 1 week of discharge  Other Instructions -   If symptoms worsens including lower extremity shakiness, seizures, or weakness please return to the ED  Please review this entire after visit summary as additional general instructions including medication list, appointments, activity, diet, any pertinent wound care, and other additional recommendations from your care team that may be provided for you.      Sincerely,     Faina Urrutia MD

## 2024-01-16 NOTE — CASE MANAGEMENT
Case Management Discharge Planning Note    Patient name Kamilla Pat  Location S /S -01 MRN [id number]  : 1945 Date 2024       Current Admission Date: 2024  Current Admission Diagnosis:Ambulatory dysfunction   Patient Active Problem List    Diagnosis Date Noted    Generalized weakness 2024    Seizure-like activity (HCC) 2024    Hyponatremia 2024    Ambulatory dysfunction 2024    Multiple subsegmental pulmonary emboli without acute cor pulmonale (HCC) 2024    Pure hypercholesterolemia 06/10/2020    Edema, lower extremity 06/10/2020    CKD (chronic kidney disease) stage 3, GFR 30-59 ml/min (HCC) 06/10/2020    Parkinson disease 2020      LOS (days): 3  Geometric Mean LOS (GMLOS) (days): 4.5  Days to GMLOS:0.9     OBJECTIVE:  Risk of Unplanned Readmission Score: 14.4         Current admission status: Inpatient   Preferred Pharmacy:   [facility]pharmacy #[id number] [place], [place] - [address] [address]  Phone: [phone] Fax: [phone]    Primary Care Provider: Olivia Blackwood MD    Primary Insurance: MEDICARE  Secondary Insurance: [facility]    DISCHARGE DETAILS:    Discharge planning discussed with:: Patient, spouse and daughter  Freedom of Choice: Yes  Comments - Freedom of Choice: CM s/w patient and family regarding discharge plans. Patient is a readmit and ambulated with RW with assistance from nurse. Patient wants to be discharged today and not wait for therapy to evaluate tomorrow. Patient and family requesting [facility] - referral made.  CM contacted family/caregiver?: Yes  Were Treatment Team discharge recommendations reviewed with patient/caregiver?: Yes  Did patient/caregiver verbalize understanding of patient care needs?: Yes  Were patient/caregiver advised of the risks associated with not following Treatment Team discharge recommendations?: Yes         Requested Home Health Care         Is the patient  interested in C at discharge?: Yes  Home Health Discipline requested:: Nursing, Occupational Therapy, Physical Therapy  Home Health Agency Name:: [facility]  Home Health Follow-Up Provider:: PCP  Home Health Services Needed:: Evaluate Functional Status and Safety, Gait/ADL Training, Strengthening/Theraputic Exercises to Improve Function  Homebound Criteria Met:: Uses an Assist Device (i.e. cane, walker, etc)  Supporting Clincal Findings:: Limited Endurance    DME Referral Provided  Referral made for DME?: No (Declines)    Other Referral/Resources/Interventions Provided:  Interventions: HHC  [facility] Comments:  [facility]    Would you like to participate in our [facility] service program?  : No - Declined    Treatment Team Recommendation: Home with Home Health Care  Discharge Destination Plan:: Home with Home Health Care              IMM Given (Date):: 01/16/24  IMM Given to:: Family  Family notified:: Spouse

## 2024-01-16 NOTE — PROGRESS NOTES
NEUROLOGY RESIDENCY PROGRESS NOTE     Name: Kamilla Pat   Age & Sex: 78 y.o. female   MRN: [id number]  Unit/Bed#: S -01   Encounter: [id number]    Kamilla Pat will need follow up in in 4 weeks with epilepsy Advanced Practitioner .  She will not require outpatient neurological testing.     Pending for discharge: Per Primary Team    ASSESSMENT & PLAN     Seizure-like activity (HCC)  Assessment & Plan  Assessment:  Patient is a 78-year-old female with a past medical history of hypertension, hyperlipidemia, Parkinson disease who was recently admitted for COVID infection complicated by pulmonary emboli currently on apixaban and readmitted for urine tract infection and hyponatremia General weakness, the presented with seizure-like activity consisting of episodic jaw jerking, unresponsiveness, and an episode of generalized tonic-clonic activity with apparent postictal state and tongue laceration concerning for elliptic seizure.     Impression:  Etiology of seizures unclear, may potentially be provoked or due to underlying CNS insult as episodes reported to be initially focal which may be demonstrative of hemispheric dysfunction.    Plan:  - Case discussed with attending neurologist Dr. Galloawy  - Recommend continuing neurologic checks  - Recommend fall precautions, seizure precautions  - Recommend MRI brain with contrast (okay to complete in outpatient setting)  - [employer] paperwork to be completed before discharge  - Routine EEG results pending  - Recommend continuing levetiracetam 750 mg every 12 hours  - Recommend lorazepam 0.1 mg/kg for any event lasting more than 3 to 5 minutes or recurrent events without return to baseline in between  - Recommend noncontrast CT head for any acute changes status or neurologic examination felt not to be secondary to seizure activity.  - Appreciate management metabolic, infectious, inflammatory derangements per primary team  - DVT prophylaxis per primary team  - No  further inpatient neurology recommendations at this time, please call for any questions or concerns  - Patient may follow-up in outpatient neurology setting with advanced practitioner in 4 to 6 weeks.  If MRI is completed inpatient, further outpatient imaging is not required.  - Rest of care per primary team appreciated    Parkinson disease  Assessment & Plan  - Recommend continuing patient's home regiment of carbidopa levodopa.      SUBJECTIVE     Patient was seen and examined. No acute events overnight.  Patient reported no complaints or concerns during evaluation, the patient reported no headache, chest pain, shortness of breath, nausea, vomiting, change in sensorium, or new changes in her ability to move, perceived interact with her environment, and reported no further seizure-like activity.    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  Patient was provided education on seizure precautions, seizure safety with attending physician present.    Patient demonstrated understanding of the fact that [employer] paperwork will be completed, and that she is not allowed to operate a vehicle for minimum of 6 months per [place] law.    Review of Systems   Constitutional:  Negative for chills and fatigue.   HENT:  Negative for congestion, drooling, ear pain, sneezing and voice change.         Tongue laceration, blood products on teeth   Eyes:  Negative for pain, discharge and visual disturbance.   Respiratory:  Negative for cough, chest tightness and shortness of breath.    Cardiovascular:  Negative for chest pain and palpitations.   Gastrointestinal:  Negative for abdominal distention, blood in stool and diarrhea.   Genitourinary:  Negative for dysuria and hematuria.   Musculoskeletal:  Negative for neck pain and neck stiffness.   Skin:   Negative for color change.   Neurological:  Negative for dizziness, tremors, facial asymmetry, speech difficulty, weakness, numbness and headaches.   Psychiatric/Behavioral:  Negative for agitation, confusion, decreased concentration, hallucinations, self-injury and suicidal ideas. The patient is nervous/anxious.      OBJECTIVE     Patient ID: Kamilla Pat is a 78 y.o. female.    Vitals:    01/15/24 0851 01/15/24 1517 01/15/24 2147 24 0735   BP: 117/62 123/70 122/64 102/63   BP Location:  Right arm     Pulse: 87 101 91 89   Resp:  18  16   Temp:  98.4 °F (36.9 °C) 98.2 °F (36.8 °C) 97.9 °F (36.6 °C)   TempSrc:  Axillary     SpO2: 94% 95% 91% (!) 86%   Weight:       Height:          Temperature:   Temp (24hrs), Av.2 °F (36.8 °C), Min:97.9 °F (36.6 °C), Max:98.4 °F (36.9 °C)    Temperature: 97.9 °F (36.6 °C)      Physical Exam  Vitals and nursing note reviewed.   Constitutional:       General: She is not in acute distress.     Appearance: She is well-developed. She is not toxic-appearing.   HENT:      Head: Normocephalic and atraumatic.      Mouth/Throat:      Mouth: Mucous membranes are moist.      Pharynx: No oropharyngeal exudate.   Eyes:      Extraocular Movements: Extraocular movements intact and EOM normal.      Conjunctiva/sclera: Conjunctivae normal.      Pupils: Pupils are equal, round, and reactive to light.   Cardiovascular:      Rate and Rhythm: Normal rate and regular rhythm.   Pulmonary:      Effort: No respiratory distress.   Abdominal:      General: There is no distension.   Musculoskeletal:         General: No swelling.      Cervical back: Neck supple.      Right lower leg: No edema.      Left lower leg: No edema.   Skin:     General: Skin is warm and dry.      Capillary Refill: Capillary refill takes less than 2 seconds.   Neurological:      Mental Status: She is alert.      Coordination: Finger-Nose-Finger Test normal.      Deep Tendon Reflexes:      Reflex Scores:       Tricep  reflexes are 2+ on the right side and 2+ on the left side.       Bicep reflexes are 2+ on the right side and 2+ on the left side.       Brachioradialis reflexes are 2+ on the right side and 2+ on the left side.       Patellar reflexes are 0 on the right side and 0 on the left side.       Achilles reflexes are 2+ on the right side and 2+ on the left side.  Psychiatric:         Mood and Affect: Mood normal.         Speech: Speech normal.          Neurologic Exam     Mental Status   Oriented to person.   Oriented to place.   Oriented to year, month, date and day.   Follows 2 step commands.   Attention: normal. Concentration: decreased.   Speech: speech is normal   Level of consciousness: alert  Able to perform simple calculations.   Able to name object. Able to repeat. Normal comprehension.     Cranial Nerves     CN II   Right visual field deficit: none  Left visual field deficit: none     CN III, IV, VI   Pupils are equal, round, and reactive to light.  Extraocular motions are normal.   Right pupil: Size: 3 mm. Shape: regular. Reactivity: brisk.   Left pupil: Size: 3 mm. Shape: regular. Reactivity: brisk.   CN III: no CN III palsy  CN VI: no CN VI palsy  Nystagmus: none   Diplopia: none  Ophthalmoparesis: none    CN V   Right facial sensation deficit: none  Left facial sensation deficit: none    CN VII   Right facial weakness: none  Left facial weakness: none    CN IX, X   Palate: symmetric    CN XI   Right sternocleidomastoid strength: normal  Left sternocleidomastoid strength: normal  Right trapezius strength: normal  Left trapezius strength: normal    CN XII   Tongue: not atrophic  Fasciculations: absent  Tongue deviation: none  Hearing grossly intact     Motor Exam   Muscle bulk: normal  Right arm tone: normal  Left arm tone: normal  Right arm pronator drift: absent  Left arm pronator drift: absent  Right leg tone: normal  Left leg tone: normal  Patient demonstrated grossly 4+/5 in all 4 extremities.     Sensory  Exam   Right arm light touch: normal  Left arm light touch: normal  Right leg light touch: normal  Left leg light touch: normal    Gait, Coordination, and Reflexes     Coordination   Finger to nose coordination: normal    Tremor   Resting tremor: absent  Intention tremor: absent  Action tremor: absent    Reflexes   Right brachioradialis: 2+  Left brachioradialis: 2+  Right biceps: 2+  Left biceps: 2+  Right triceps: 2+  Left triceps: 2+  Right patellar: 0  Left patellar: 0  Right achilles: 2+  Left achilles: 2+  Right plantar: normal  Left plantar: normal  Right Cantu: absent  Left Cantu: absent  Right ankle clonus: absent  Left ankle clonus: absent     LABORATORY DATA     Labs: I have personally reviewed pertinent reports.    Results from last 7 days   Lab Units 01/15/24  0440 01/14/24  1602 01/14/24  0417 01/12/24 2321 01/12/24  0333   WBC Thousand/uL 10.78*  --  6.52 8.88 6.39   HEMOGLOBIN g/dL 10.8*  --  11.9 12.4 11.7   I STAT HEMOGLOBIN g/dl  --  11.6  --   --   --    HEMATOCRIT % 32.7*  --  35.4 37.7 35.2   HEMATOCRIT, ISTAT %  --  34*  --   --   --    PLATELETS Thousands/uL 156  --  202 201 216   NEUTROS PCT %  --   --  72 85* 75   MONOS PCT %  --   --  11 7 9   EOS PCT %  --   --  4 0 2      Results from last 7 days   Lab Units 01/16/24  0612 01/15/24  0440 01/14/24  1602 01/14/24  1147 01/13/24  0959 01/12/24  2321 01/12/24  0333 01/11/24  1013   SODIUM mmol/L 131* 131*  --  129*   < > 129*   < > 133*   POTASSIUM mmol/L 3.7 3.6  --  3.4*   < > 3.5   < > 3.0*   CHLORIDE mmol/L 99 100  --  95*   < > 96   < > 97   CO2 mmol/L 21 21  --  24   < > 20*   < > 20*   CO2, I-STAT mmol/L  --   --  14*  --   --   --   --   --    BUN mg/dL 14 12  --  13   < > 16   < > 10   CREATININE mg/dL 0.70 0.66  --  0.72   < > 0.96   < > 0.82   CALCIUM mg/dL 8.6 8.5  --  8.9   < > 9.4   < > 9.4   ALK PHOS U/L  --   --   --   --   --  83  --  78   ALT U/L  --   --   --   --   --  <3*  --  <3*   AST U/L  --   --   --   --   --   5*  --  5*   GLUCOSE, ISTAT mg/dl  --   --  120  --   --   --   --   --     < > = values in this interval not displayed.     Results from last 7 days   Lab Units 01/12/24  2321   MAGNESIUM mg/dL 1.8*          Results from last 7 days   Lab Units 01/12/24  0333 01/11/24  2046 01/11/24  1311   INR   --  1.07 1.02   PTT seconds 81* 72* 26     Results from last 7 days   Lab Units 01/13/24  0045   LACTIC ACID mmol/L 1.0           IMAGING & DIAGNOSTIC TESTING     Radiology Results: I have personally reviewed pertinent reports.      MRI brain wo contrast   Final Result by London Gibbs MD (01/14 1807)         1. No acute infarction, intracranial hemorrhage or mass effect.   2. Minimal, chronic microangiopathy.      Workstation performed: [id number]         CT head wo contrast   Final Result by London Gibbs MD (01/14 1805)         1. No acute intracranial image, mass effect or edema.   2. Chronic ventriculomegaly is similar to the brain MRI from 2020                  Workstation performed: [id number]         MRI brain w contrast    (Results Pending)     Other Diagnostic Testing: I have personally reviewed pertinent reports.      ACTIVE MEDICATIONS     Current Facility-Administered Medications   Medication Dose Route Frequency    apixaban (ELIQUIS) tablet 10 mg  10 mg Oral BID    Followed by    [START ON 1/19/2024] apixaban (ELIQUIS) tablet 5 mg  5 mg Oral BID    carbidopa-levodopa (SINEMET)  mg per tablet 2 tablet  2 tablet Oral 4x Daily    Diclofenac Sodium (VOLTAREN) 1 % topical gel 2 g  2 g Topical TID    Diclofenac Sodium (VOLTAREN) 1 % topical gel 2 g  2 g Topical 4x Daily PRN    ezetimibe (ZETIA) tablet 10 mg  10 mg Oral HS    levETIRAcetam (KEPPRA) tablet 750 mg  750 mg Oral Q12H CESAR    LORazepam (ATIVAN) injection 0.5 mg  0.5 mg Intravenous 30 min pre-procedure    potassium chloride oral solution 10 mEq  10 mEq Oral Daily     Prior to Admission medications    Medication Sig Start Date End Date  Taking? Authorizing Provider   apixaban (Eliquis) 5 mg Take 2 tablets (10 mg total) by mouth 2 (two) times a day for 7 days, THEN 1 tablet (5 mg total) 2 (two) times a day for 21 days. 1/12/24 2/9/24 Yes Jonathan Jenkins MD   carbidopa-levodopa (SINEMET)  mg per tablet Take 2.5 tablets by mouth 4 (four) times a day Take one tablet 3 times a day and then take 1.5 tablets at bedtime. 1/12/24 2/11/24 Yes Monica Rojas DO   diclofenac sodium (VOLTAREN) 1 % APPLY 2 GM TO AFFECTED AREA THREE TIMES A DAY AS NEEDED. 4/1/20  Yes Historical Provider, MD   Diclofenac Sodium (VOLTAREN) 1 % Apply 2 g topically 4 (four) times a day 8/15/23  Yes Gloria Christianson MD   ezetimibe (ZETIA) 10 mg tablet Take by mouth Daily 4/1/19  Yes Historical Provider, MD   potassium chloride (MICRO-K) 10 MEQ CR capsule Take 1 capsule by mouth Daily 4/1/19  Yes Historical Provider, MD   triamterene-hydrochlorothiazide (DYAZIDE) 37.5-25 mg per capsule Take 1 capsule by mouth Daily 4/1/19  Yes Historical Provider, MD   acetaminophen (TYLENOL) 325 mg tablet Take 325 mg by mouth every 6 (six) hours as needed for mild pain    Historical Provider, MD   Calcium Carbonate-Vitamin D (CALCIUM-CARB 600 + D PO) Take 1 tablet by mouth daily    Historical Provider, MD   Cholecalciferol 50 MCG (2000 UT) CAPS Take 1 capsule by mouth daily    Historical Provider, MD     VTE Pharmacologic Prophylaxis: Per Primary Team    VTE Mechanical Prophylaxis: Per Primary Team    ======    I have discussed the patient's history, physical exam findings, assessment, and plan in detail with attending, Dr. Galloway    Thank you for allowing me to participate in the care of your patient, Kamilla Pat.    Keaton Richards, DO  [facility] Neurology Residency, PGY-3

## 2024-01-18 ENCOUNTER — HOME CARE VISIT (OUTPATIENT)
Dept: HOME HEALTH SERVICES | Facility: HOME HEALTHCARE | Age: 79
End: 2024-01-18
Payer: MEDICARE

## 2024-01-18 VITALS
TEMPERATURE: 96 F | RESPIRATION RATE: 14 BRPM | OXYGEN SATURATION: 96 % | DIASTOLIC BLOOD PRESSURE: 68 MMHG | SYSTOLIC BLOOD PRESSURE: 122 MMHG | HEART RATE: 88 BPM

## 2024-01-18 PROCEDURE — G0299 HHS/HOSPICE OF RN EA 15 MIN: HCPCS

## 2024-01-18 PROCEDURE — 10330081 VN NO-PAY CLAIM PROCEDURE

## 2024-01-18 PROCEDURE — 400013 VN SOC

## 2024-01-18 NOTE — CASE COMMUNICATION
Back office please fax to PCP Olivia Blackwood MD Fax: [phone]     Patient: Kamilla Pat : 49    [facility] has admitted your patient to Home Health service with the following disciplines:      SN, PT and OT  Response needed, please respond via phone [phone] In [facility]  Primary focus of home health care: Pulmonary  Patient stated goals of care:  Walk unaided.  Anticipated visit pattern: 1w1 2w2 a nd next visit date: 24 PE  See medication list - meds in home differ from AVS: Patient reports 20 meq of potassium chloride per day not 10 meq daily. Per [facility] note from Nahomi Sandoval, DO - 2023 10:10 AM EST 20 meq.  No longer using vitamin D 2000 IU please DC.   Significant clinical findings: Would you like a BMP to follow up on Hyponatremia?  SOB with minimal exertion.  Potential barriers to goal achievement: immobility     Thank you for allowing us to participate in the care of your patient.      Cesar Gardner RN

## 2024-01-22 PROCEDURE — G0180 MD CERTIFICATION HHA PATIENT: HCPCS | Performed by: HOSPITALIST

## 2024-01-23 ENCOUNTER — HOME CARE VISIT (OUTPATIENT)
Dept: HOME HEALTH SERVICES | Facility: HOME HEALTHCARE | Age: 79
End: 2024-01-23
Payer: MEDICARE

## 2024-01-23 VITALS — HEART RATE: 63 BPM | OXYGEN SATURATION: 98 %

## 2024-01-23 PROCEDURE — G0299 HHS/HOSPICE OF RN EA 15 MIN: HCPCS

## 2024-01-23 PROCEDURE — G0152 HHCP-SERV OF OT,EA 15 MIN: HCPCS

## 2024-01-24 ENCOUNTER — HOME CARE VISIT (OUTPATIENT)
Dept: HOME HEALTH SERVICES | Facility: HOME HEALTHCARE | Age: 79
End: 2024-01-24
Payer: MEDICARE

## 2024-01-24 VITALS
DIASTOLIC BLOOD PRESSURE: 80 MMHG | TEMPERATURE: 97.1 F | OXYGEN SATURATION: 98 % | SYSTOLIC BLOOD PRESSURE: 122 MMHG | RESPIRATION RATE: 16 BRPM | HEART RATE: 79 BPM

## 2024-01-24 PROCEDURE — G0151 HHCP-SERV OF PT,EA 15 MIN: HCPCS

## 2024-01-26 ENCOUNTER — HOME CARE VISIT (OUTPATIENT)
Dept: HOME HEALTH SERVICES | Facility: HOME HEALTHCARE | Age: 79
End: 2024-01-26
Payer: MEDICARE

## 2024-01-26 VITALS
SYSTOLIC BLOOD PRESSURE: 126 MMHG | OXYGEN SATURATION: 99 % | RESPIRATION RATE: 16 BRPM | HEART RATE: 74 BPM | DIASTOLIC BLOOD PRESSURE: 80 MMHG

## 2024-01-26 PROCEDURE — G0151 HHCP-SERV OF PT,EA 15 MIN: HCPCS

## 2024-01-30 ENCOUNTER — HOME CARE VISIT (OUTPATIENT)
Dept: HOME HEALTH SERVICES | Facility: HOME HEALTHCARE | Age: 79
End: 2024-01-30
Payer: MEDICARE

## 2024-01-30 VITALS
HEART RATE: 77 BPM | TEMPERATURE: 97.4 F | OXYGEN SATURATION: 97 % | SYSTOLIC BLOOD PRESSURE: 138 MMHG | RESPIRATION RATE: 16 BRPM | DIASTOLIC BLOOD PRESSURE: 78 MMHG

## 2024-01-30 PROCEDURE — G0299 HHS/HOSPICE OF RN EA 15 MIN: HCPCS

## 2024-01-31 ENCOUNTER — HOME CARE VISIT (OUTPATIENT)
Dept: HOME HEALTH SERVICES | Facility: HOME HEALTHCARE | Age: 79
End: 2024-01-31
Payer: MEDICARE

## 2024-01-31 VITALS — SYSTOLIC BLOOD PRESSURE: 132 MMHG | DIASTOLIC BLOOD PRESSURE: 88 MMHG | OXYGEN SATURATION: 95 % | HEART RATE: 75 BPM

## 2024-01-31 PROCEDURE — G0158 HHC OT ASSISTANT EA 15: HCPCS

## 2024-02-02 ENCOUNTER — HOME CARE VISIT (OUTPATIENT)
Dept: HOME HEALTH SERVICES | Facility: HOME HEALTHCARE | Age: 79
End: 2024-02-02
Payer: MEDICARE

## 2024-02-05 ENCOUNTER — HOME CARE VISIT (OUTPATIENT)
Dept: HOME HEALTH SERVICES | Facility: HOME HEALTHCARE | Age: 79
End: 2024-02-05
Payer: MEDICARE

## 2024-02-06 ENCOUNTER — APPOINTMENT (EMERGENCY)
Dept: CT IMAGING | Facility: HOSPITAL | Age: 79
DRG: 101 | End: 2024-02-06
Payer: MEDICARE

## 2024-02-06 ENCOUNTER — HOSPITAL ENCOUNTER (INPATIENT)
Facility: HOSPITAL | Age: 79
LOS: 1 days | DRG: 101 | End: 2024-02-08
Attending: EMERGENCY MEDICINE | Admitting: INTERNAL MEDICINE
Payer: MEDICARE

## 2024-02-06 ENCOUNTER — HOME CARE VISIT (OUTPATIENT)
Dept: HOME HEALTH SERVICES | Facility: HOME HEALTHCARE | Age: 79
End: 2024-02-06
Payer: MEDICARE

## 2024-02-06 DIAGNOSIS — R26.2 AMBULATORY DYSFUNCTION: ICD-10-CM

## 2024-02-06 DIAGNOSIS — G20.A1 PARKINSON DISEASE: Chronic | ICD-10-CM

## 2024-02-06 DIAGNOSIS — E87.6 HYPOKALEMIA: ICD-10-CM

## 2024-02-06 DIAGNOSIS — R79.81 HYPOCARBIA: ICD-10-CM

## 2024-02-06 DIAGNOSIS — R56.9 SEIZURE-LIKE ACTIVITY (HCC): Primary | ICD-10-CM

## 2024-02-06 DIAGNOSIS — E87.20 LACTIC ACIDOSIS: ICD-10-CM

## 2024-02-06 LAB
ALBUMIN SERPL BCP-MCNC: 4.4 G/DL (ref 3.5–5)
ALP SERPL-CCNC: 92 U/L (ref 34–104)
ALT SERPL W P-5'-P-CCNC: <3 U/L (ref 7–52)
AMPHETAMINES SERPL QL SCN: NEGATIVE
ANION GAP SERPL CALCULATED.3IONS-SCNC: 27 MMOL/L
APAP SERPL-MCNC: <2 UG/ML (ref 10–20)
APTT PPP: 24 SECONDS (ref 23–37)
AST SERPL W P-5'-P-CCNC: 7 U/L (ref 13–39)
BACTERIA UR QL AUTO: NORMAL /HPF
BARBITURATES UR QL: NEGATIVE
BASOPHILS # BLD AUTO: 0.04 THOUSANDS/ÂΜL (ref 0–0.1)
BASOPHILS NFR BLD AUTO: 1 % (ref 0–1)
BENZODIAZ UR QL: NEGATIVE
BILIRUB SERPL-MCNC: 1.28 MG/DL (ref 0.2–1)
BILIRUB UR QL STRIP: NEGATIVE
BUN SERPL-MCNC: 10 MG/DL (ref 5–25)
CALCIUM SERPL-MCNC: 9.5 MG/DL (ref 8.4–10.2)
CARDIAC TROPONIN I PNL SERPL HS: <2 NG/L
CHLORIDE SERPL-SCNC: 98 MMOL/L (ref 96–108)
CK SERPL-CCNC: 26 U/L (ref 26–192)
CLARITY UR: CLEAR
CO2 SERPL-SCNC: 9 MMOL/L (ref 21–32)
COCAINE UR QL: NEGATIVE
COLOR UR: YELLOW
CREAT SERPL-MCNC: 0.88 MG/DL (ref 0.6–1.3)
EOSINOPHIL # BLD AUTO: 0.02 THOUSAND/ÂΜL (ref 0–0.61)
EOSINOPHIL NFR BLD AUTO: 0 % (ref 0–6)
ERYTHROCYTE [DISTWIDTH] IN BLOOD BY AUTOMATED COUNT: 16.7 % (ref 11.6–15.1)
ETHANOL SERPL-MCNC: <10 MG/DL
GFR SERPL CREATININE-BSD FRML MDRD: 64 ML/MIN/1.73SQ M
GLUCOSE SERPL-MCNC: 132 MG/DL (ref 65–140)
GLUCOSE SERPL-MCNC: 140 MG/DL (ref 65–140)
GLUCOSE UR STRIP-MCNC: NEGATIVE MG/DL
HCT VFR BLD AUTO: 39.8 % (ref 34.8–46.1)
HGB BLD-MCNC: 12.5 G/DL (ref 11.5–15.4)
HGB UR QL STRIP.AUTO: NEGATIVE
HOLD SPECIMEN: NORMAL
IMM GRANULOCYTES # BLD AUTO: 0.06 THOUSAND/UL (ref 0–0.2)
IMM GRANULOCYTES NFR BLD AUTO: 1 % (ref 0–2)
INR PPP: 1.04 (ref 0.84–1.19)
KETONES UR STRIP-MCNC: ABNORMAL MG/DL
LACTATE SERPL-SCNC: 11.8 MMOL/L (ref 0.5–2)
LEUKOCYTE ESTERASE UR QL STRIP: NEGATIVE
LYMPHOCYTES # BLD AUTO: 0.92 THOUSANDS/ÂΜL (ref 0.6–4.47)
LYMPHOCYTES NFR BLD AUTO: 14 % (ref 14–44)
MAGNESIUM SERPL-MCNC: 2.1 MG/DL (ref 1.9–2.7)
MCH RBC QN AUTO: 28.2 PG (ref 26.8–34.3)
MCHC RBC AUTO-ENTMCNC: 31.4 G/DL (ref 31.4–37.4)
MCV RBC AUTO: 90 FL (ref 82–98)
METHADONE UR QL: NEGATIVE
MONOCYTES # BLD AUTO: 0.39 THOUSAND/ÂΜL (ref 0.17–1.22)
MONOCYTES NFR BLD AUTO: 6 % (ref 4–12)
NEUTROPHILS # BLD AUTO: 5.01 THOUSANDS/ÂΜL (ref 1.85–7.62)
NEUTS SEG NFR BLD AUTO: 78 % (ref 43–75)
NITRITE UR QL STRIP: NEGATIVE
NON-SQ EPI CELLS URNS QL MICRO: NORMAL /HPF
NRBC BLD AUTO-RTO: 0 /100 WBCS
OPIATES UR QL SCN: NEGATIVE
OXYCODONE+OXYMORPHONE UR QL SCN: NEGATIVE
PCP UR QL: NEGATIVE
PH UR STRIP.AUTO: 5.5 [PH]
PHOSPHATE SERPL-MCNC: 3.9 MG/DL (ref 2.3–4.1)
PLATELET # BLD AUTO: 257 THOUSANDS/UL (ref 149–390)
PMV BLD AUTO: 12.5 FL (ref 8.9–12.7)
POTASSIUM SERPL-SCNC: 3.1 MMOL/L (ref 3.5–5.3)
PROT SERPL-MCNC: 7.1 G/DL (ref 6.4–8.4)
PROT UR STRIP-MCNC: ABNORMAL MG/DL
PROTHROMBIN TIME: 14.2 SECONDS (ref 11.6–14.5)
RBC # BLD AUTO: 4.43 MILLION/UL (ref 3.81–5.12)
RBC #/AREA URNS AUTO: NORMAL /HPF
SALICYLATES SERPL-MCNC: <5 MG/DL (ref 3–20)
SODIUM SERPL-SCNC: 134 MMOL/L (ref 135–147)
SP GR UR STRIP.AUTO: 1.03 (ref 1–1.03)
THC UR QL: NEGATIVE
TSH SERPL DL<=0.05 MIU/L-ACNC: 4.42 UIU/ML (ref 0.45–4.5)
UROBILINOGEN UR STRIP-ACNC: <2 MG/DL
WBC # BLD AUTO: 6.44 THOUSAND/UL (ref 4.31–10.16)
WBC #/AREA URNS AUTO: NORMAL /HPF

## 2024-02-06 PROCEDURE — 96361 HYDRATE IV INFUSION ADD-ON: CPT

## 2024-02-06 PROCEDURE — 82948 REAGENT STRIP/BLOOD GLUCOSE: CPT

## 2024-02-06 PROCEDURE — 85730 THROMBOPLASTIN TIME PARTIAL: CPT | Performed by: PHYSICIAN ASSISTANT

## 2024-02-06 PROCEDURE — 36415 COLL VENOUS BLD VENIPUNCTURE: CPT

## 2024-02-06 PROCEDURE — 99285 EMERGENCY DEPT VISIT HI MDM: CPT | Performed by: PHYSICIAN ASSISTANT

## 2024-02-06 PROCEDURE — 83735 ASSAY OF MAGNESIUM: CPT | Performed by: PHYSICIAN ASSISTANT

## 2024-02-06 PROCEDURE — 70450 CT HEAD/BRAIN W/O DYE: CPT

## 2024-02-06 PROCEDURE — 85025 COMPLETE CBC W/AUTO DIFF WBC: CPT | Performed by: PHYSICIAN ASSISTANT

## 2024-02-06 PROCEDURE — 84100 ASSAY OF PHOSPHORUS: CPT | Performed by: PHYSICIAN ASSISTANT

## 2024-02-06 PROCEDURE — 93005 ELECTROCARDIOGRAM TRACING: CPT

## 2024-02-06 PROCEDURE — G1004 CDSM NDSC: HCPCS

## 2024-02-06 PROCEDURE — 80179 DRUG ASSAY SALICYLATE: CPT | Performed by: PHYSICIAN ASSISTANT

## 2024-02-06 PROCEDURE — 82077 ASSAY SPEC XCP UR&BREATH IA: CPT | Performed by: PHYSICIAN ASSISTANT

## 2024-02-06 PROCEDURE — 82550 ASSAY OF CK (CPK): CPT | Performed by: PHYSICIAN ASSISTANT

## 2024-02-06 PROCEDURE — 80307 DRUG TEST PRSMV CHEM ANLYZR: CPT | Performed by: PHYSICIAN ASSISTANT

## 2024-02-06 PROCEDURE — 99285 EMERGENCY DEPT VISIT HI MDM: CPT

## 2024-02-06 PROCEDURE — 84484 ASSAY OF TROPONIN QUANT: CPT | Performed by: PHYSICIAN ASSISTANT

## 2024-02-06 PROCEDURE — 83605 ASSAY OF LACTIC ACID: CPT | Performed by: EMERGENCY MEDICINE

## 2024-02-06 PROCEDURE — 85610 PROTHROMBIN TIME: CPT | Performed by: PHYSICIAN ASSISTANT

## 2024-02-06 PROCEDURE — 96374 THER/PROPH/DIAG INJ IV PUSH: CPT

## 2024-02-06 PROCEDURE — 80143 DRUG ASSAY ACETAMINOPHEN: CPT | Performed by: PHYSICIAN ASSISTANT

## 2024-02-06 PROCEDURE — 81001 URINALYSIS AUTO W/SCOPE: CPT | Performed by: PHYSICIAN ASSISTANT

## 2024-02-06 PROCEDURE — 84443 ASSAY THYROID STIM HORMONE: CPT | Performed by: PHYSICIAN ASSISTANT

## 2024-02-06 PROCEDURE — 80177 DRUG SCRN QUAN LEVETIRACETAM: CPT | Performed by: PHYSICIAN ASSISTANT

## 2024-02-06 PROCEDURE — 80053 COMPREHEN METABOLIC PANEL: CPT | Performed by: PHYSICIAN ASSISTANT

## 2024-02-06 RX ORDER — LORAZEPAM 2 MG/ML
1 INJECTION INTRAMUSCULAR ONCE
Status: COMPLETED | OUTPATIENT
Start: 2024-02-06 | End: 2024-02-06

## 2024-02-06 RX ORDER — LORAZEPAM 2 MG/ML
2 INJECTION INTRAMUSCULAR ONCE
Status: DISCONTINUED | OUTPATIENT
Start: 2024-02-06 | End: 2024-02-06

## 2024-02-06 RX ORDER — LEVETIRACETAM 500 MG/5ML
1500 INJECTION, SOLUTION, CONCENTRATE INTRAVENOUS ONCE
Status: COMPLETED | OUTPATIENT
Start: 2024-02-06 | End: 2024-02-06

## 2024-02-06 RX ADMIN — LEVETIRACETAM 1500 MG: 100 INJECTION, SOLUTION INTRAVENOUS at 21:57

## 2024-02-06 RX ADMIN — SODIUM CHLORIDE 1000 ML: 0.9 INJECTION, SOLUTION INTRAVENOUS at 22:03

## 2024-02-06 RX ADMIN — SODIUM CHLORIDE 1000 ML: 0.9 INJECTION, SOLUTION INTRAVENOUS at 21:55

## 2024-02-06 NOTE — LETTER
Thank you for allowing us to participate in the care of your patient, Kamilla Pat, who was hospitalized from 2/6/2024 through 2/8/2024 with the admitting diagnosis of seizure-like activities.  CT head, MRI brain with contrast were performed with no acute findings.  Neurology was also consulted with recommendation of increase her Keppra to 1000 mg twice daily.  During her hospital stay, she did not have any recurrent seizure activities.  She is medically stable to be discharged today with follow-up with neurology as outpatient.      If you have any additional questions or would like to discuss further, please feel free to contact me.    Ruperto Sexton MD  [facility] Internal Medicine, Hospitalist  [phone]

## 2024-02-06 NOTE — Clinical Note
Case was discussed with slim resident and the patient's admission status was agreed to be Admission Status: inpatient status to the service of , internal medicine

## 2024-02-07 ENCOUNTER — APPOINTMENT (INPATIENT)
Dept: MRI IMAGING | Facility: HOSPITAL | Age: 79
DRG: 101 | End: 2024-02-07
Payer: MEDICARE

## 2024-02-07 ENCOUNTER — HOME CARE VISIT (OUTPATIENT)
Dept: HOME HEALTH SERVICES | Facility: HOME HEALTHCARE | Age: 79
End: 2024-02-07
Payer: MEDICARE

## 2024-02-07 ENCOUNTER — APPOINTMENT (INPATIENT)
Dept: NEUROLOGY | Facility: HOSPITAL | Age: 79
DRG: 101 | End: 2024-02-07
Payer: MEDICARE

## 2024-02-07 PROBLEM — E87.1 HYPONATREMIA: Status: RESOLVED | Noted: 2024-01-13 | Resolved: 2024-02-07

## 2024-02-07 LAB
2HR DELTA HS TROPONIN: >0 NG/L
ANION GAP SERPL CALCULATED.3IONS-SCNC: 9 MMOL/L
BUN SERPL-MCNC: 7 MG/DL (ref 5–25)
CALCIUM SERPL-MCNC: 8.2 MG/DL (ref 8.4–10.2)
CARDIAC TROPONIN I PNL SERPL HS: 2 NG/L
CHLORIDE SERPL-SCNC: 107 MMOL/L (ref 96–108)
CO2 SERPL-SCNC: 21 MMOL/L (ref 21–32)
CREAT SERPL-MCNC: 0.55 MG/DL (ref 0.6–1.3)
ERYTHROCYTE [DISTWIDTH] IN BLOOD BY AUTOMATED COUNT: 16.6 % (ref 11.6–15.1)
GFR SERPL CREATININE-BSD FRML MDRD: 91 ML/MIN/1.73SQ M
GLUCOSE SERPL-MCNC: 91 MG/DL (ref 65–140)
HCT VFR BLD AUTO: 33.2 % (ref 34.8–46.1)
HGB BLD-MCNC: 10.6 G/DL (ref 11.5–15.4)
LACTATE SERPL-SCNC: 1.6 MMOL/L (ref 0.5–2)
MCH RBC QN AUTO: 28 PG (ref 26.8–34.3)
MCHC RBC AUTO-ENTMCNC: 31.9 G/DL (ref 31.4–37.4)
MCV RBC AUTO: 88 FL (ref 82–98)
PLATELET # BLD AUTO: 200 THOUSANDS/UL (ref 149–390)
PMV BLD AUTO: 11.8 FL (ref 8.9–12.7)
POTASSIUM SERPL-SCNC: 3.4 MMOL/L (ref 3.5–5.3)
RBC # BLD AUTO: 3.79 MILLION/UL (ref 3.81–5.12)
SODIUM SERPL-SCNC: 137 MMOL/L (ref 135–147)
WBC # BLD AUTO: 7.26 THOUSAND/UL (ref 4.31–10.16)

## 2024-02-07 PROCEDURE — 95816 EEG AWAKE AND DROWSY: CPT

## 2024-02-07 PROCEDURE — 80048 BASIC METABOLIC PNL TOTAL CA: CPT

## 2024-02-07 PROCEDURE — 36415 COLL VENOUS BLD VENIPUNCTURE: CPT

## 2024-02-07 PROCEDURE — 96361 HYDRATE IV INFUSION ADD-ON: CPT

## 2024-02-07 PROCEDURE — 70553 MRI BRAIN STEM W/O & W/DYE: CPT

## 2024-02-07 PROCEDURE — 99223 1ST HOSP IP/OBS HIGH 75: CPT | Performed by: PSYCHIATRY & NEUROLOGY

## 2024-02-07 PROCEDURE — 85027 COMPLETE CBC AUTOMATED: CPT

## 2024-02-07 PROCEDURE — A9585 GADOBUTROL INJECTION: HCPCS | Performed by: INTERNAL MEDICINE

## 2024-02-07 PROCEDURE — 99223 1ST HOSP IP/OBS HIGH 75: CPT | Performed by: INTERNAL MEDICINE

## 2024-02-07 RX ORDER — POTASSIUM CHLORIDE 20 MEQ/1
40 TABLET, EXTENDED RELEASE ORAL ONCE
Status: COMPLETED | OUTPATIENT
Start: 2024-02-07 | End: 2024-02-07

## 2024-02-07 RX ORDER — GADOBUTROL 604.72 MG/ML
7 INJECTION INTRAVENOUS
Status: COMPLETED | OUTPATIENT
Start: 2024-02-07 | End: 2024-02-07

## 2024-02-07 RX ORDER — POTASSIUM CHLORIDE 20MEQ/15ML
10 LIQUID (ML) ORAL DAILY
Status: DISCONTINUED | OUTPATIENT
Start: 2024-02-07 | End: 2024-02-08 | Stop reason: HOSPADM

## 2024-02-07 RX ORDER — LEVETIRACETAM 500 MG/1
1000 TABLET ORAL EVERY 12 HOURS SCHEDULED
Status: DISCONTINUED | OUTPATIENT
Start: 2024-02-07 | End: 2024-02-08 | Stop reason: HOSPADM

## 2024-02-07 RX ORDER — LEVETIRACETAM 250 MG/1
750 TABLET ORAL EVERY 12 HOURS SCHEDULED
Status: DISCONTINUED | OUTPATIENT
Start: 2024-02-07 | End: 2024-02-07

## 2024-02-07 RX ORDER — MELATONIN
1000 2 TIMES DAILY
Status: DISCONTINUED | OUTPATIENT
Start: 2024-02-07 | End: 2024-02-08 | Stop reason: HOSPADM

## 2024-02-07 RX ORDER — EZETIMIBE 10 MG/1
10 TABLET ORAL
Status: DISCONTINUED | OUTPATIENT
Start: 2024-02-07 | End: 2024-02-08 | Stop reason: HOSPADM

## 2024-02-07 RX ORDER — CALCIUM CARBONATE 500(1250)
1 TABLET ORAL DAILY
Status: DISCONTINUED | OUTPATIENT
Start: 2024-02-07 | End: 2024-02-08 | Stop reason: HOSPADM

## 2024-02-07 RX ADMIN — APIXABAN 5 MG: 5 TABLET, FILM COATED ORAL at 08:48

## 2024-02-07 RX ADMIN — POTASSIUM CHLORIDE 40 MEQ: 1500 TABLET, EXTENDED RELEASE ORAL at 02:10

## 2024-02-07 RX ADMIN — CARBIDOPA AND LEVODOPA 3 TABLET: 25; 100 TABLET ORAL at 18:12

## 2024-02-07 RX ADMIN — POTASSIUM CHLORIDE 10 MEQ: 1.5 SOLUTION ORAL at 08:53

## 2024-02-07 RX ADMIN — CARBIDOPA AND LEVODOPA 2 TABLET: 25; 100 TABLET ORAL at 12:15

## 2024-02-07 RX ADMIN — POTASSIUM CHLORIDE 40 MEQ: 1500 TABLET, EXTENDED RELEASE ORAL at 04:41

## 2024-02-07 RX ADMIN — APIXABAN 5 MG: 5 TABLET, FILM COATED ORAL at 18:12

## 2024-02-07 RX ADMIN — EZETIMIBE 10 MG: 10 TABLET ORAL at 02:09

## 2024-02-07 RX ADMIN — EZETIMIBE 10 MG: 10 TABLET ORAL at 21:16

## 2024-02-07 RX ADMIN — Medication 1 TABLET: at 08:48

## 2024-02-07 RX ADMIN — LEVETIRACETAM 750 MG: 250 TABLET, FILM COATED ORAL at 08:49

## 2024-02-07 RX ADMIN — CARBIDOPA AND LEVODOPA 3 TABLET: 25; 100 TABLET ORAL at 21:16

## 2024-02-07 RX ADMIN — GADOBUTROL 7 ML: 604.72 INJECTION INTRAVENOUS at 18:01

## 2024-02-07 RX ADMIN — Medication 1000 UNITS: at 08:49

## 2024-02-07 RX ADMIN — Medication 1000 UNITS: at 18:13

## 2024-02-07 RX ADMIN — LEVETIRACETAM 1000 MG: 500 TABLET, FILM COATED ORAL at 21:12

## 2024-02-07 RX ADMIN — CARBIDOPA AND LEVODOPA 2 TABLET: 25; 100 TABLET ORAL at 08:48

## 2024-02-07 NOTE — CONSULTS
"  NEUROLOGY RESIDENCY CONSULT NOTE     Name: Kamilla Pat   Age & Sex: 78 y.o. female   MRN: [id number]  Unit/Bed#: S -01   Encounter: [id number]  Length of Stay: 0    Kamilla Pat will need follow up in in 4 weeks with epilepsy Advanced Practitioner .  She will not require outpatient neurological testing.       Pending for discharge: Pending MRI/EEG, rest per primary team    ASSESSMENT & PLAN     * Seizure-like activity (HCC)  Assessment & Plan  Assessment:  Patient is a 78-year-old right-handed female with past medical history of Parkinson disease, seizure-like activity, hypercholesterolemia, CKD stage III, history of multiple subsegmental pulmonary emoli that presented to [facility] on February 6, 2024 for seizure-like activity.  Patient reported she was sitting in bed, was eating Doritos chips, felt that her jaw was \"locking up\" told her partner, , that \"I can feel it coming on\".  The patient reported then feeling that her whole body \"started to shake a lot\", reported that her first episode was February 5 2024, lasting around 5 minutes, and second episode February 6, 2024, having lasted around 15 minutes.  Patient reported that she was aware through the entire episode, was able to look around, however was unable to move or speak.  Patient reported that she felt very tired afterwards, reported no loss of consciousness, no bowel bladder incontinence, no tongue biting.  Patient reported that this event occurred soon after having taken her carbidopa levodopa, and reported that incidentally she noticed that this also happened the day prior.  The patient reported that her event on February 6, 2024 was the same as prior to presentation in January 2024, reports that she has not missed any of her levetiracetam dosing, and reports that her 's license was suspended.    Impression:  Unclear reasoning as to why patient has been experiencing increasing seizure episodes.  Further evaluation " with MRI, EEG will be completed and levetiracetam baseline dosing will be increased to 1000 every 12 hours.    Plan:  - Case discussed with attending neurologist Dr. Shane  - Maintain normotension, normothermia, euglycemia  - Recommend MRI brain with and without contrast seizure protocol  - Recommend routine EEG  - Recommend levetiracetam 1000 mg every 12 hours  - Recommend continuing carbidopa levodopa home dosing  - [employer] to be completed before discharge  --Education regarding driving cessation, seizure safety, precautions of medication adherence provided, patient understanding.  - Continue to evaluate and treat any infectious, inflammatory, metabolic derangements  - Continue PT/OT  - Continue seizure precautions  - DVT prophylaxis per primary team  - Recommend 1 mg of intravenous lorazepam for generalized tonic-clonic seizure only lasting more than 3 minutes, please contact neurology  - Recommend noncontrast CT head, STAT, to be completed for any acute change in mental status/neurologic examination, please contact neurology  - Recommend observing patient for minimum of 24 hours, if MRI brain and EEG are both unremarkable, not indicating acute intracranial pathology, not indicating seizure activity or seizure potential, patient okay to follow-up with neurology in outpatient setting with advanced practitioner in 4 to 6 weeks.  No further imaging required at this time.  - Rest per primary team appreciated      SUBJECTIVE     Reason for Consult / Principal Problem: Seizure activity  Hx and PE limited by: Fatigue    HPI: Kamilla Pat is a 78 y.o. right handed female with past medical history of hypercholesterolemia, Parkinson disease, CKD stage III, ambulatory dysfunction, generalized weakness, multiple subsegmental pulmonary emboli without acute cor pulmonale presented to [facility] on February 6, 2024 for seizure-like activity.    Per ED note, activity was witnessed to have lasted around 15  "minutes.  History was obtained by family members at bedside.  Patient  reported she was found to \"have jaw quivering and then arm jerking and then full body shaking with loss of consciousness for about 15 minutes\".  Patient reported that after 15 minutes she interacted with her  and her environment.  911 was called and patient subsequently came to ED for further evaluation.  Patient recently admitted for pulmonary embolism, hypokalemia, and Parkinson disease and a consultation to neurology on 1/14/2024.  During the previous admission, seizure activity was described as follows: Patient noted to begin having jaw jerking again (217 difficulty explaining which), went unresponsive, then was described to look up have generalized clonic activity on the extremities with arching of her back.  This lasted for around 30 seconds before it self resolved.  She then appeared very tired and poorly responsive.  Rapid response noted a left gaze following the event.  Patient did have a tongue laceration on the right.  Stat CT head demonstrated no clear acute pathology.  MRI brain without contrast demonstrated no acute infarction, intracranial hemorrhage, or mass effect.  Minimal, chronic microangiopathic changes noted.  EEG demonstrated mild generalized nonspecific encephalopathy, no electrographic seizures, interictal epileptiform discharges (seizure tendency) or focal slowing were seen.  No diagnostic clinical events were captured.  Patient was discharged on levetiracetam 750 mg every 12 hours.    Blood pressure on this admission initially 177/84, pulse 105, respirations 18, SpO2 99% on room air.    Initial labs indicated the following:  - WBC 6.44, RBC 4.43, hemoglobin 12.5, hematocrit 39.8, RDW 16.7, neutrophils relative 70%  - PT 14.2, PTT 24, INR 1.04  - Lactic acid 11.8, 2-hour lactic acid 1.6  - Troponin 0-hour less than 2, 2-hour troponin 2  - Levetiracetam level pending  - Magnesium 2.1  - Phosphorus 3.9  - " Total CK20 6  - TSH 4.419  - Sodium 134, potassium 3.1, chloride 98, carbon dioxide 9, BUN 10, creatinine 0.88, glucose 140, T. bili 1.28, EGFR 64  - Ethanol level less than 10  - Acetaminophen level less than 2  - Salicylate level less than 5  - Urinalysis negative for leukocytes, nitrites, 1+ protein, negative glucose, 2+ ketones  - Urine microscope demonstrated no epithelial cells no bacteria  - UDS negative    CT head without contrast demonstrated stable findings without acute intracranial abnormality.  Stable moderate enlargement of ventricles, cerebral sulci and cisterns appear normal in size and configuration for chronological age.    Patient was given 1500 mg of IV levetiracetam    Chart review indicates patient was [facility] on January 25, 2024 for Parkinson disease.  Review indicated at that time patient's examination was notable for left sided resting tremor, decreased arm swing when walking, rigidity grade 2 on left than right.  Patient was previously started on Sinemet 25 100 3 times daily which patient reported to helping, however reported she would like to increase her medication at night as she finds her tremor to be worse at that time.  History and examination at that time consistent with tremors most likely secondary to Parkinson disease given the fact the patient is responsive to medication.  Patient's symptoms reportedly started in early 2020 with left hand and left leg tremor, more at rest.  Patient utilizes Sinemet 1 tab at 0600, 12 00, and 2 tabs nightly.  The patient reported increased tremor when the dose is missed, and reported unfortunately doses appear to be missed frequently.  The patient noted some balance difficulty with walking, reported no falls.  Reported no loss of smell, REM sleep disorder.  No significant cognitive change or psychiatric symptoms.  Selegiline was attempted in the past which patient was unable to tolerate, Mirapex extended release was not covered  by insurance.  Neupro in future may be considered if covered by insurance.    Previous lipid panel 11/2023: Cholesterol 220, glycerides 278, HDL 31,     Inpatient consult to Neurology  Consult performed by: Keaton Richards DO  Consult ordered by: Sharon Glez MD        Historical Information   Past Medical History:   Diagnosis Date    Anxiety     Hepatitis C 04/01/2017    screening Negative    High cholesterol     Hip pain     History of low potassium     Lower extremity edema     Obesity     Osteopenia     Overweight     Thigh pain      Past Surgical History:   Procedure Laterality Date    BLADDER SURGERY      CHOLECYSTECTOMY      COLOGUARD (HISTORICAL)  07/02/2018    COLONOSCOPY  01/01/2007    DXA PROCEDURE (HISTORICAL)  03/27/2014    MAMMO (HISTORICAL)  08/31/2016    REDUCTION MAMMAPLASTY      REPLACEMENT TOTAL KNEE Bilateral     TOTAL HIP ARTHROPLASTY Left 01/01/2015    TOTAL KNEE ARTHROPLASTY Left      Social History   Social History     Substance and Sexual Activity   Alcohol Use Yes    Comment: social, Occasional      Social History     Substance and Sexual Activity   Drug Use Never     E-Cigarette/Vaping    E-Cigarette Use Never User      E-Cigarette/Vaping Substances    Nicotine No     THC No     CBD No     Flavoring No     Other No     Unknown No      Social History     Tobacco Use   Smoking Status Never   Smokeless Tobacco Never     Family History:   Family History   Problem Relation Age of Onset    Glaucoma Mother     Hypertension Mother     Glaucoma Father     Leukemia Father     Hypertension Father      Meds/Allergies   current meds:   Current Facility-Administered Medications   Medication Dose Route Frequency    apixaban (ELIQUIS) tablet 5 mg  5 mg Oral BID    calcium carbonate (OYSTER SHELL,OSCAL) 500 mg tablet 1 tablet  1 tablet Oral Daily    carbidopa-levodopa (SINEMET)  mg per tablet 2 tablet  2 tablet Oral 4x Daily    cholecalciferol (VITAMIN D3) tablet 1,000 Units  1,000 Units  Oral BID    ezetimibe (ZETIA) tablet 10 mg  10 mg Oral HS    levETIRAcetam (KEPPRA) tablet 1,000 mg  1,000 mg Oral Q12H CESAR    potassium chloride oral solution 10 mEq  10 mEq Oral Daily    and PTA meds:   Prior to Admission Medications   Prescriptions Last Dose Informant Patient Reported? Taking?   Calcium Carbonate-Vitamin D (CALCIUM-CARB 600 + D PO)   Yes No   Sig: Take 1 tablet by mouth daily   Cholecalciferol 50 MCG (2000 UT) CAPS   Yes No   Sig: Take 1 capsule by mouth daily   Diclofenac Sodium (VOLTAREN) 1 %   No No   Sig: Apply 2 g topically 4 (four) times a day   acetaminophen (TYLENOL) 325 mg tablet   Yes No   Sig: Take 325 mg by mouth every 6 (six) hours as needed for mild pain   apixaban (Eliquis) 5 mg   No No   Sig: Take 2 tablets (10 mg total) by mouth 2 (two) times a day for 7 days, THEN 1 tablet (5 mg total) 2 (two) times a day for 21 days.   carbidopa-levodopa (SINEMET)  mg per tablet   No No   Sig: Take 2 tablets by mouth 4 (four) times a day for 7 days   ezetimibe (ZETIA) 10 mg tablet   Yes No   Sig: Take by mouth Daily   levETIRAcetam (KEPPRA) 750 mg tablet   No No   Sig: Take 1 tablet (750 mg total) by mouth every 12 (twelve) hours   potassium chloride (MICRO-K) 10 MEQ CR capsule   Yes No   Sig: Take 20 mEq by mouth Daily Per [facility] note from Nahomi Sandoval, DO - 12/19/2023 10:10 AM EST 20 meq.    triamterene-hydrochlorothiazide (DYAZIDE) 37.5-25 mg per capsule   Yes No   Sig: Take 1 capsule by mouth Daily      Facility-Administered Medications: None     Allergies   Allergen Reactions    Hydrocodone-Acetaminophen GI Intolerance    Hydromorphone GI Intolerance    Oxycodone-Acetaminophen GI Intolerance    Statins Myalgia     Review of previous medical records was completed.     Review of Systems   Constitutional:  Positive for fatigue. Negative for activity change, appetite change and fever.   HENT:  Negative for ear discharge, facial swelling, postnasal drip, sneezing, trouble swallowing and  voice change.    Eyes:  Negative for photophobia and visual disturbance.   Cardiovascular:  Negative for chest pain and palpitations.   Gastrointestinal:  Negative for diarrhea, nausea and vomiting.   Genitourinary:  Negative for frequency and urgency.   Musculoskeletal:  Negative for neck pain and neck stiffness.   Skin:  Negative for color change.   Neurological:  Positive for tremors and seizures. Negative for dizziness, syncope, facial asymmetry, speech difficulty, weakness, light-headedness, numbness and headaches.   Psychiatric/Behavioral:  Negative for agitation, behavioral problems, confusion, decreased concentration, hallucinations, self-injury, sleep disturbance and suicidal ideas. The patient is not nervous/anxious and is not hyperactive.      OBJECTIVE     Patient ID: Kamilla Pat is a 78 y.o. female.    Vitals:   Vitals:    24 0200 24 0300 24 0600 24 1152   BP: 141/73 145/70 144/66 118/73   BP Location:   Right arm Right arm   Pulse: 85 79 84 82   Resp: 18 18 18 18   Temp:    (!) 97.4 °F (36.3 °C)   TempSrc:    Oral   SpO2: 95% 98% 96% 96%      There is no height or weight on file to calculate BMI.   No intake or output data in the 24 hours ending 24 1411    Temperature:   Temp (24hrs), Av.1 °F (36.7 °C), Min:97.4 °F (36.3 °C), Max:98.7 °F (37.1 °C)    Temperature: (!) 97.4 °F (36.3 °C)    Invasive Devices:   Invasive Devices       Peripheral Intravenous Line  Duration             Peripheral IV 24 Left Antecubital 1 day    Peripheral IV 24 Right Antecubital <1 day                  Physical Exam  Vitals and nursing note reviewed.   Constitutional:       General: She is not in acute distress.     Appearance: She is well-developed. She is not toxic-appearing.   HENT:      Head: Normocephalic and atraumatic.      Mouth/Throat:      Mouth: Mucous membranes are moist.      Pharynx: No oropharyngeal exudate.   Eyes:      Extraocular Movements: Extraocular  movements intact and EOM normal.      Conjunctiva/sclera: Conjunctivae normal.      Pupils: Pupils are equal, round, and reactive to light.   Cardiovascular:      Rate and Rhythm: Normal rate and regular rhythm.   Pulmonary:      Effort: No respiratory distress.   Abdominal:      General: There is no distension.   Musculoskeletal:         General: No swelling.      Cervical back: Neck supple.      Right lower leg: No edema.      Left lower leg: No edema.   Skin:     General: Skin is warm and dry.      Capillary Refill: Capillary refill takes less than 2 seconds.   Neurological:      Mental Status: She is alert.      Coordination: Finger-Nose-Finger Test normal.      Deep Tendon Reflexes:      Reflex Scores:       Tricep reflexes are 2+ on the right side and 2+ on the left side.       Bicep reflexes are 2+ on the right side and 2+ on the left side.       Brachioradialis reflexes are 2+ on the right side and 2+ on the left side.       Patellar reflexes are 0 on the right side and 0 on the left side.       Achilles reflexes are 2+ on the right side and 2+ on the left side.  Psychiatric:         Mood and Affect: Mood normal.         Speech: Speech normal.          Neurologic Exam     Mental Status   Oriented to person.   Oriented to place. Oriented to country.   Oriented to time. Oriented to year, month, date and day.   Follows 3 step commands.   Attention: normal. Concentration: normal.   Speech: speech is normal   Level of consciousness: alert  Able to perform simple calculations.   Able to name object. Able to repeat. Normal comprehension.     Cranial Nerves     CN II   Right visual field deficit: none  Left visual field deficit: none     CN III, IV, VI   Pupils are equal, round, and reactive to light.  Extraocular motions are normal.   Right pupil: Size: 4 mm. Shape: regular. Reactivity: brisk. Accommodation: intact.   Left pupil: Size: 4 mm. Shape: regular. Reactivity: brisk. Accommodation: intact.   CN III: no CN  III palsy  CN VI: no CN VI palsy  Nystagmus: none   Diplopia: none  Ophthalmoparesis: none    CN V   Right facial sensation deficit: none  Left facial sensation deficit: none    CN VII   Right facial weakness: none  Left facial weakness: none    CN IX, X   Palate: symmetric    CN XI   Right sternocleidomastoid strength: normal  Left sternocleidomastoid strength: normal  Right trapezius strength: normal  Left trapezius strength: normal    CN XII   Tongue: not atrophic  Fasciculations: absent  Tongue deviation: none  Hearing grossly intact     Motor Exam   Muscle bulk: normal  Right arm tone: normal  Left arm tone: normal  Right arm pronator drift: absent  Left arm pronator drift: absent  Right leg tone: normal  Left leg tone: normal  Patient demonstrated grossly 4+/5 in all 4 extremities in proximal and distal compartments.     Sensory Exam   Right arm light touch: normal  Left arm light touch: normal  Right leg light touch: normal  Left leg light touch: normal    Gait, Coordination, and Reflexes     Coordination   Finger to nose coordination: normal    Tremor   Resting tremor: present  Intention tremor: absent  Action tremor: absent    Reflexes   Right brachioradialis: 2+  Left brachioradialis: 2+  Right biceps: 2+  Left biceps: 2+  Right triceps: 2+  Left triceps: 2+  Right patellar: 0  Left patellar: 0  Right achilles: 2+  Left achilles: 2+  Right plantar: normal  Left plantar: normal  Right Cantu: absent  Left Cantu: absent  Right ankle clonus: absent  Left ankle clonus: absent     LABORATORY DATA     Labs: I have personally reviewed pertinent reports.    Results from last 7 days   Lab Units 02/07/24  0441 02/06/24  2107   WBC Thousand/uL 7.26 6.44   HEMOGLOBIN g/dL 10.6* 12.5   HEMATOCRIT % 33.2* 39.8   PLATELETS Thousands/uL 200 257   NEUTROS PCT %  --  78*   MONOS PCT %  --  6   EOS PCT %  --  0      Results from last 7 days   Lab Units 02/07/24  0441 02/06/24  2110   POTASSIUM mmol/L 3.4* 3.1*   CHLORIDE  mmol/L 107 98   CO2 mmol/L 21 9*   BUN mg/dL 7 10   CREATININE mg/dL 0.55* 0.88   CALCIUM mg/dL 8.2* 9.5   ALK PHOS U/L  --  92   ALT U/L  --  <3*   AST U/L  --  7*     Results from last 7 days   Lab Units 02/06/24  2110   MAGNESIUM mg/dL 2.1     Results from last 7 days   Lab Units 02/06/24  2110   PHOSPHORUS mg/dL 3.9      Results from last 7 days   Lab Units 02/06/24  2107   INR  1.04   PTT seconds 24     Results from last 7 days   Lab Units 02/06/24  2340   LACTIC ACID mmol/L 1.6           IMAGING & DIAGNOSTIC TESTING     Radiology Results: I have personally reviewed pertinent reports.    CT head without contrast   ED Interpretation by Deshawn Machuca PA-C (02/07 0022)   Reading Physician Reading Date Result Priority  Micky Bahena MD  [phone] 2/7/2024     Narrative & Impression  CT BRAIN - WITHOUT CONTRAST     INDICATION:   Seizure disorder, clinical change  hx of seizures; witnessed seizure this evening with loc.     COMPARISON: 1/14/2024.     TECHNIQUE:  CT examination of the brain was performed.  Multiplanar 2D reformatted images were created from the source data.     Radiation dose length product (DLP) for this visit:  946 mGy-cm .  This examination, like all CT scans performed in the  Network, was performed utilizing techniques to minimize radiation dose exposure, including the use of iterative   reconstruction and automated exposure control.     IMAGE QUALITY:  Diagnostic.     FINDINGS:     PARENCHYMA:  No intracranial mass, mass effect or midline shift. No CT signs of acute infarction.  No acute parenchymal hemorrhage.     VENTRICLES AND EXTRA-AXIAL SPACES: Stable moderate enlargement of the ventricles. Cerebral sulci    and cisterns appear normal in size and configuration for chronological age..     VISUALIZED ORBITS: Normal visualized orbits.     PARANASAL SINUSES: Normal visualized paranasal sinuses.     CALVARIUM AND EXTRACRANIAL SOFT TISSUES: Unremarkable.     IMPRESSION:      Stable findings without acute intracranial abnormality.                 Workstation performed: [id number]        Final Result by Micky Bahena MD (02/07 0000)      Stable findings without acute intracranial abnormality.                  Workstation performed: [id number]         MRI inpatient order    (Results Pending)     Other Diagnostic Testing: I have personally reviewed pertinent reports.      ACTIVE MEDICATIONS     Current Facility-Administered Medications   Medication Dose Route Frequency    apixaban (ELIQUIS) tablet 5 mg  5 mg Oral BID    calcium carbonate (OYSTER SHELL,OSCAL) 500 mg tablet 1 tablet  1 tablet Oral Daily    carbidopa-levodopa (SINEMET)  mg per tablet 2 tablet  2 tablet Oral 4x Daily    cholecalciferol (VITAMIN D3) tablet 1,000 Units  1,000 Units Oral BID    ezetimibe (ZETIA) tablet 10 mg  10 mg Oral HS    levETIRAcetam (KEPPRA) tablet 1,000 mg  1,000 mg Oral Q12H CESAR    potassium chloride oral solution 10 mEq  10 mEq Oral Daily     Prior to Admission medications    Medication Sig Start Date End Date Taking? Authorizing Provider   acetaminophen (TYLENOL) 325 mg tablet Take 325 mg by mouth every 6 (six) hours as needed for mild pain    Historical Provider, MD   apixaban (Eliquis) 5 mg Take 2 tablets (10 mg total) by mouth 2 (two) times a day for 7 days, THEN 1 tablet (5 mg total) 2 (two) times a day for 21 days. 1/12/24 2/9/24  Jonathan Jenkins MD   Calcium Carbonate-Vitamin D (CALCIUM-CARB 600 + D PO) Take 1 tablet by mouth daily    Historical Provider, MD   carbidopa-levodopa (SINEMET)  mg per tablet Take 2 tablets by mouth 4 (four) times a day for 7 days 1/16/24 1/23/24  Faina Urrutia MD   Cholecalciferol 50 MCG (2000 UT) CAPS Take 1 capsule by mouth daily    Historical Provider, MD   Diclofenac Sodium (VOLTAREN) 1 % Apply 2 g topically 4 (four) times a day 8/15/23   [facility] Cindi Chirstianson MD   ezetimibe (ZETIA) 10 mg tablet Take by mouth Daily 4/1/19   Historical Provider, MD    levETIRAcetam (KEPPRA) 750 mg tablet Take 1 tablet (750 mg total) by mouth every 12 (twelve) hours 1/16/24 2/15/24  Faina Urrutia MD   potassium chloride (MICRO-K) 10 MEQ CR capsule Take 20 mEq by mouth Daily Per [facility] note from Nahomi Sandoval DO - 12/19/2023 10:10 AM EST 20 meq.  4/1/19   Historical Provider, MD   triamterene-hydrochlorothiazide (DYAZIDE) 37.5-25 mg per capsule Take 1 capsule by mouth Daily 4/1/19   Historical Provider, MD     CODE STATUS & ADVANCED DIRECTIVES     Code Status: Level 1 - Full Code  Advance Directive and Living Will:      Power of :    POLST:        VTE Pharmacologic Prophylaxis: Per Primary Team  VTE Mechanical Prophylaxis: Per Primary Team    ======    I have discussed the patient's history, physical exam findings, assessment, and plan in detail with attending, Dr. Shane    Thank you for allowing me to participate in the care of your patient, Kamilla Pat.    Keaton Richards DO  [facility] Neurology Residency, PGY-3

## 2024-02-07 NOTE — ASSESSMENT & PLAN NOTE
"Assessment:  Patient is a 78-year-old right-handed female with past medical history of Parkinson disease, seizure-like activity, hypercholesterolemia, CKD stage III, history of multiple subsegmental pulmonary emoli that presented to [facility] on February 6, 2024 for seizure-like activity.  Patient reported she was sitting in bed, was eating [employer]itos chips, felt that her jaw was \"locking up\" told her partner, , that \"I can feel it coming on\".  The patient reported then feeling that her whole body \"started to shake a lot\", reported that her first episode was February 5 2024, lasting around 5 minutes, and second episode February 6, 2024, having lasted around 15 minutes.  Patient reported that she was aware through the entire episode, was able to look around, however was unable to move or speak.  Patient reported that she felt very tired afterwards, reported no loss of consciousness, no bowel bladder incontinence, no tongue biting.  Patient reported that this event occurred soon after having taken her carbidopa levodopa, and reported that incidentally she noticed that this also happened the day prior.  The patient reported that her event on February 6, 2024 was the same as prior to presentation in January 2024, reports that she has not missed any of her levetiracetam dosing, and reports that her 's license was suspended.    Impression:  Unclear reasoning as to why patient has been experiencing increasing seizure episodes.  Further evaluation with MRI demonstrated no acute intracranial pathology, and EEG demonstrated findings consistent with mild cerebral diffuse dysfunction of nonspecific etiology.    Plan:  - Case discussed with attending neurologist Dr. Calvo  - Maintain normotension, normothermia, euglycemia  - Recommend levetiracetam 1000 mg every 12 hours  - Recommend continuing carbidopa levodopa home dosing  - [employer] completed and submitted on 2/7/2024  --Education regarding driving " cessation, seizure safety, precautions and of medication adherence provided, patient understanding.  - Continue to evaluate and treat any infectious, inflammatory, metabolic derangements  - Continue PT/OT  - Continue seizure precautions  - DVT prophylaxis per primary team  - Recommend 1 mg of intravenous lorazepam for generalized tonic-clonic seizure only lasting more than 3 minutes, please contact neurology  - Recommend noncontrast CT head, STAT, to be completed for any acute change in mental status/neurologic examination, please contact neurology  - No further inpatient neurologic recommendations at this time, please contact for any further questions or concerns.  - Patient to follow-up with neurology in outpatient setting with advanced practitioner in 4 to 6 weeks.  No further imaging required at this time.  - Rest per primary team appreciated

## 2024-02-07 NOTE — ASSESSMENT & PLAN NOTE
Patient reports having seizure-like activity for the past 3 nights  Seizure-like activity tonight lasted for around 15 minutes  Patient family reports patient was found to have generalized shaking and arm jerking and then full body shaking with loss of consciousness  No urinary or bowel incontinence reported, tongue bite present on the left side  Patient was recently admitted on January 12, due to PE and was found to have seizure in the hospital  Patient was started on Keppra 750 Mg twice daily  MRI brain without contrast on 1/14/2024 with no acute infarction, intracranial hemorrhage or mass effect.  EEG: Abnormal with Generalized irregular delta activity   Lactic acid: 11.8 - 1.6   CT head: Stable findings without acute intracranial abnormality.     PLAN:  Consult neurology   Continue keppra 750 mg twice daily

## 2024-02-07 NOTE — ED PROVIDER NOTES
"History  Chief Complaint   Patient presents with    Seizure - Prior Hx Of     Pt arrives via EMS from home. Pt has hx of seizures and had one earlier today and was going to just follow up with neurology. Pt had another witnessed seizure tonight so family called EMS. Pt had a third grand mal seizure per EMS prior to arrival. Pt postictal. Also hx of parkinson's.     Patient is a 78-year-old female with history of Parkinson's disease, osteopenia, pulmonary embolism surgical history of cholecystectomy, presents to the emergency department with seizure-like activity witnessed lasting approximately for 15 minutes.  Patient is surrounded by family members at this time at bedside that provide all of patient history at this time.  Patient's  states that patient was found to \"have jaw quivering and then arm jerking then full body shaking with loss of consciousness for about 15 minutes.\"  Patient has been stated that after 15 minutes was concluded after patient with witnessed seizure-like activity, patient started regarding patient  and regarding her environment as well.  Patient has been stated that he had called 911 and patient came to the ED for further evaluation of seizure-like activity.  Patient was recently admitted to the hospital for treatment of pulmonary embolism, hypokalemia, and Parkinson disease with consult to neurology on 1/14/2024 for seizure-like activity noticed \"consisting of episodic jaw jerking, unresponsiveness, and an episode generalized clonic activity with apparent both ictal state and tongue laceration...\" With load of 2 g of IV Keppra and continuing on 750 mg of twice daily maintenance...\" MRI brain without contrast on 1/14/2024 with no acute infarction, intracranial hemorrhage or mass effect...\" EEG awake or drowsy routine on 1/14/2024-noted abnormal routine EEG recording due to-generalized irregular delta activity...\" Patient has been also reports that patient has not taken her " "Eliquis medication 5 mg last couple days \"I gave her too much and now they will not fill her prescription.\"  Patient daughter states that they had called the patient PCP for prescription of Eliquis secondary to patient diagnosis of pulmonary embolism.  Patient  also reports that she had a recent increase in Sinemet for the last 3 days          Seizure - Prior Hx Of      Prior to Admission Medications   Prescriptions Last Dose Informant Patient Reported? Taking?   Calcium Carbonate-Vitamin D (CALCIUM-CARB 600 + D PO)   Yes No   Sig: Take 1 tablet by mouth daily   Cholecalciferol 50 MCG (2000 UT) CAPS   Yes No   Sig: Take 1 capsule by mouth daily   Diclofenac Sodium (VOLTAREN) 1 %   No No   Sig: Apply 2 g topically 4 (four) times a day   acetaminophen (TYLENOL) 325 mg tablet   Yes No   Sig: Take 325 mg by mouth every 6 (six) hours as needed for mild pain   apixaban (Eliquis) 5 mg   No No   Sig: Take 2 tablets (10 mg total) by mouth 2 (two) times a day for 7 days, THEN 1 tablet (5 mg total) 2 (two) times a day for 21 days.   carbidopa-levodopa (SINEMET)  mg per tablet   No No   Sig: Take 2 tablets by mouth 4 (four) times a day for 7 days   ezetimibe (ZETIA) 10 mg tablet   Yes No   Sig: Take by mouth Daily   levETIRAcetam (KEPPRA) 750 mg tablet   No No   Sig: Take 1 tablet (750 mg total) by mouth every 12 (twelve) hours   potassium chloride (MICRO-K) 10 MEQ CR capsule   Yes No   Sig: Take 20 mEq by mouth Daily Per [facility] note from Nahomi Sandoval, DO - 12/19/2023 10:10 AM EST 20 meq.    triamterene-hydrochlorothiazide (DYAZIDE) 37.5-25 mg per capsule   Yes No   Sig: Take 1 capsule by mouth Daily      Facility-Administered Medications: None       Past Medical History:   Diagnosis Date    Anxiety     Hepatitis C 04/01/2017    screening Negative    High cholesterol     Hip pain     History of low potassium     Lower extremity edema     Obesity     Osteopenia     Overweight     Thigh pain        Past Surgical " History:   Procedure Laterality Date    BLADDER SURGERY      CHOLECYSTECTOMY      COLOGUARD (HISTORICAL)  07/02/2018    COLONOSCOPY  01/01/2007    DXA PROCEDURE (HISTORICAL)  03/27/2014    MAMMO (HISTORICAL)  08/31/2016    REDUCTION MAMMAPLASTY      REPLACEMENT TOTAL KNEE Bilateral     TOTAL HIP ARTHROPLASTY Left 01/01/2015    TOTAL KNEE ARTHROPLASTY Left        Family History   Problem Relation Age of Onset    Glaucoma Mother     Hypertension Mother     Glaucoma Father     Leukemia Father     Hypertension Father      I have reviewed and agree with the history as documented.    E-Cigarette/Vaping    E-Cigarette Use Never User      E-Cigarette/Vaping Substances    Nicotine No     THC No     CBD No     Flavoring No     Other No     Unknown No      Social History     Tobacco Use    Smoking status: Never    Smokeless tobacco: Never   Vaping Use    Vaping status: Never Used   Substance Use Topics    Alcohol use: Yes     Comment: social, Occasional     Drug use: Never       Review of Systems   Constitutional:  Negative for activity change, appetite change, chills and fever.   HENT:  Negative for congestion, postnasal drip, rhinorrhea, sinus pressure, sinus pain, sore throat and tinnitus.    Eyes:  Negative for photophobia and visual disturbance.   Respiratory:  Negative for cough, chest tightness and shortness of breath.    Cardiovascular:  Negative for chest pain and palpitations.   Gastrointestinal:  Negative for abdominal pain, constipation, diarrhea, nausea and vomiting.   Genitourinary:  Negative for difficulty urinating, dysuria, flank pain, frequency and urgency.   Musculoskeletal:  Negative for back pain, gait problem, neck pain and neck stiffness.   Skin:  Negative for pallor and rash.   Allergic/Immunologic: Negative for environmental allergies and food allergies.   Neurological:  Positive for seizures. Negative for dizziness, weakness, numbness and headaches.   Psychiatric/Behavioral:  Negative for confusion.     All other systems reviewed and are negative.      Physical Exam  Physical Exam  Vitals and nursing note reviewed.   Constitutional:       General: She is awake.      Appearance: Normal appearance. She is well-developed and normal weight. She is not ill-appearing, toxic-appearing or diaphoretic.   HENT:      Head: Normocephalic and atraumatic.      Jaw: There is normal jaw occlusion.      Right Ear: Hearing, tympanic membrane and external ear normal. No decreased hearing noted. No drainage, swelling or tenderness. No mastoid tenderness.      Left Ear: Hearing, tympanic membrane and external ear normal. No decreased hearing noted. No drainage, swelling or tenderness. No mastoid tenderness.      Nose: Nose normal.      Mouth/Throat:      Lips: Pink.      Mouth: Mucous membranes are moist.      Tongue: Lesions present.      Pharynx: Oropharynx is clear. Uvula midline.     Eyes:      General: Lids are normal. Vision grossly intact. Gaze aligned appropriately.         Right eye: No discharge.         Left eye: No discharge.      Extraocular Movements: Extraocular movements intact.      Conjunctiva/sclera: Conjunctivae normal.      Pupils: Pupils are equal, round, and reactive to light.   Neck:      Vascular: No JVD.      Trachea: Trachea and phonation normal. No tracheal tenderness or tracheal deviation.   Cardiovascular:      Rate and Rhythm: Normal rate and regular rhythm.      Pulses: Normal pulses.           Radial pulses are 2+ on the right side and 2+ on the left side.        Posterior tibial pulses are 2+ on the right side and 2+ on the left side.      Heart sounds: Normal heart sounds.   Pulmonary:      Effort: Pulmonary effort is normal.      Breath sounds: Normal breath sounds and air entry. No stridor. No decreased breath sounds, wheezing, rhonchi or rales.   Chest:      Chest wall: No tenderness.   Abdominal:      General: Abdomen is flat. Bowel sounds are normal. There is no distension.      Palpations:  Abdomen is soft. Abdomen is not rigid.      Tenderness: There is no abdominal tenderness. There is no guarding or rebound.   Musculoskeletal:         General: Normal range of motion.      Cervical back: Full passive range of motion without pain, normal range of motion and neck supple. No rigidity. No spinous process tenderness or muscular tenderness. Normal range of motion.      Comments: Passive ROM intact  Upper and lower extremity 5/5 bilaterally  Neurovascularly intact  No grinding or clicking of joints  \   Feet:      Right foot:      Toenail Condition: Right toenails are normal.      Left foot:      Toenail Condition: Left toenails are normal.   Lymphadenopathy:      Head:      Right side of head: No submental, submandibular, tonsillar, preauricular, posterior auricular or occipital adenopathy.      Left side of head: No submental, submandibular, tonsillar, preauricular, posterior auricular or occipital adenopathy.      Cervical: No cervical adenopathy.      Right cervical: No superficial, deep or posterior cervical adenopathy.     Left cervical: No superficial, deep or posterior cervical adenopathy.   Skin:     General: Skin is warm.      Capillary Refill: Capillary refill takes less than 2 seconds.      Findings: No rash.   Neurological:      General: No focal deficit present.      Mental Status: She is alert and oriented to person, place, and time. Mental status is at baseline.      GCS: GCS eye subscore is 4. GCS verbal subscore is 5. GCS motor subscore is 6.      Cranial Nerves: Cranial nerves 2-12 are intact.      Sensory: No sensory deficit.      Deep Tendon Reflexes: Reflexes are normal and symmetric.      Reflex Scores:       Patellar reflexes are 2+ on the right side and 2+ on the left side.  Psychiatric:         Mood and Affect: Mood normal.         Speech: Speech is slurred.         Behavior: Behavior is slowed. Behavior is cooperative.         Thought Content: Thought content normal.         Vital  Signs  ED Triage Vitals   Temperature Pulse Respirations Blood Pressure SpO2   02/06/24 2033 02/06/24 2032 02/06/24 2032 02/06/24 2032 02/06/24 2032   98.7 °F (37.1 °C) 105 18 (!) 177/84 99 %      Temp Source Heart Rate Source Patient Position - Orthostatic VS BP Location FiO2 (%)   02/06/24 2033 02/06/24 2032 -- -- --   Axillary Monitor         Pain Score       --                  Vitals:    02/07/24 0100 02/07/24 0130 02/07/24 0200 02/07/24 0300   BP: 160/78 154/75 141/73 145/70   Pulse: 94 87 85 79         Visual Acuity      ED Medications  Medications   apixaban (ELIQUIS) tablet 5 mg (has no administration in time range)   calcium carbonate (OYSTER SHELL,OSCAL) 500 mg tablet 1 tablet (has no administration in time range)   carbidopa-levodopa (SINEMET)  mg per tablet 2 tablet (has no administration in time range)   cholecalciferol (VITAMIN D3) tablet 1,000 Units (has no administration in time range)   ezetimibe (ZETIA) tablet 10 mg (10 mg Oral Given 2/7/24 0209)   potassium chloride oral solution 10 mEq (has no administration in time range)   potassium chloride (Klor-Con M20) CR tablet 40 mEq (has no administration in time range)   levETIRAcetam (KEPPRA) tablet 750 mg (has no administration in time range)   LORazepam (FOR EMS ONLY) (ATIVAN) 2 mg/mL injection 2 mg (0 mg Does not apply Given to EMS 2/6/24 2034)   sodium chloride 0.9 % bolus 1,000 mL (0 mL Intravenous Stopped 2/6/24 2324)   levETIRAcetam (KEPPRA) injection 1,500 mg (1,500 mg Intravenous Given 2/6/24 2157)   sodium chloride 0.9 % bolus 1,000 mL (0 mL Intravenous Stopped 2/7/24 0211)   potassium chloride (Klor-Con M20) CR tablet 40 mEq (40 mEq Oral Given 2/7/24 0210)       Diagnostic Studies  Results Reviewed       Procedure Component Value Units Date/Time    Basic metabolic panel [[id number]]     Lab Status: No result Specimen: Blood     CBC (With Platelets) [[id number]]     Lab Status: No result Specimen: Blood     HS Troponin I 2hr [[id number]]   (Normal) Collected: 02/06/24 2340    Lab Status: Final result Specimen: Blood from Arm, Right Updated: 02/07/24 0105     hs TnI 2hr 2 ng/L      Delta 2hr hsTnI >0 ng/L     Lactic acid 2 Hours [[id number]]  (Normal) Collected: 02/06/24 2340    Lab Status: Final result Specimen: Blood from Arm, Right Updated: 02/07/24 0017     LACTIC ACID 1.6 mmol/L     Narrative:      Result may be elevated if tourniquet was used during collection.    Rapid drug screen, urine [[id number]]  (Normal) Collected: 02/06/24 2231    Lab Status: Final result Specimen: Urine, Catheter Updated: 02/06/24 2306     Amph/Meth UR Negative     Barbiturate Ur Negative     Benzodiazepine Urine Negative     Cocaine Urine Negative     Methadone Urine Negative     Opiate Urine Negative     PCP Ur Negative     THC Urine Negative     Oxycodone Urine Negative    Narrative:      FOR MEDICAL PURPOSES ONLY.   IF CONFIRMATION NEEDED PLEASE CONTACT THE LAB WITHIN 5 DAYS.    Drug Screen Cutoff Levels:  AMPHETAMINE/METHAMPHETAMINES  1000 ng/mL  BARBITURATES     200 ng/mL  BENZODIAZEPINES     200 ng/mL  COCAINE      300 ng/mL  METHADONE      300 ng/mL  OPIATES      300 ng/mL  PHENCYCLIDINE     25 ng/mL  THC       50 ng/mL  OXYCODONE      100 ng/mL    [place] draw [[id number]] Collected: 02/06/24 2107    Lab Status: Final result Specimen: Blood from Arm, Right Updated: 02/06/24 2301    Narrative:      The following orders were created for panel order [place] draw.  Procedure                               Abnormality         Status                     ---------                               -----------         ------                     Light Blue Top on hold[[id number]]                           Final result               Green / Black tube on hold[[id number]]                       Final result               Lavender Top 3 ml on hold[[id number]]                        Final result                 Please view results for these tests on the individual orders.    [place]  draw [[id number]] Collected: 02/06/24 2110    Lab Status: Final result Specimen: Blood from Arm, Right Updated: 02/06/24 2301    Narrative:      The following orders were created for panel order [place] draw.  Procedure                               Abnormality         Status                     ---------                               -----------         ------                     Green / Yellow tube on hold[[id number]]                      Final result                 Please view results for these tests on the individual orders.    Urine Microscopic [[id number]]  (Normal) Collected: 02/06/24 2231    Lab Status: Final result Specimen: Urine, Straight Cath Updated: 02/06/24 2259     RBC, UA 1-2 /hpf      WBC, UA 1-2 /hpf      Epithelial Cells None Seen /hpf      Bacteria, UA None Seen /hpf     UA w Reflex to Microscopic w Reflex to Culture [[id number]]  (Abnormal) Collected: 02/06/24 2231    Lab Status: Final result Specimen: Urine, Straight Cath Updated: 02/06/24 2246     Color, UA Yellow     Clarity, UA Clear     Specific Gravity, UA 1.026     pH, UA 5.5     Leukocytes, UA Negative     Nitrite, UA Negative     Protein, UA 30 (1+) mg/dl      Glucose, UA Negative mg/dl      Ketones, UA 40 (2+) mg/dl      Urobilinogen, UA <2.0 mg/dl      Bilirubin, UA Negative     Occult Blood, UA Negative    Salicylate level [[id number]]  (Normal) Collected: 02/06/24 2153    Lab Status: Final result Specimen: Blood from Arm, Right Updated: 02/06/24 2230     Salicylate Lvl <5 mg/dL     Acetaminophen level-If concentration is detectable, please discuss with medical  on call. [[id number]]  (Abnormal) Collected: 02/06/24 2153    Lab Status: Final result Specimen: Blood from Arm, Right Updated: 02/06/24 2230     Acetaminophen Level <2 ug/mL     Ethanol [[id number]]  (Normal) Collected: 02/06/24 2153    Lab Status: Final result Specimen: Blood from Arm, Right Updated: 02/06/24 2219     Ethanol Lvl <10 mg/dL     Comprehensive metabolic  panel [[id number]]  (Abnormal) Collected: 02/06/24 2110    Lab Status: Final result Specimen: Blood from Arm, Right Updated: 02/06/24 2214     Sodium 134 mmol/L      Potassium 3.1 mmol/L      Chloride 98 mmol/L      CO2 9 mmol/L      ANION GAP 27 mmol/L      BUN 10 mg/dL      Creatinine 0.88 mg/dL      Glucose 140 mg/dL      Calcium 9.5 mg/dL      AST 7 U/L      ALT <3 U/L      Alkaline Phosphatase 92 U/L      Total Protein 7.1 g/dL      Albumin 4.4 g/dL      Total Bilirubin 1.28 mg/dL      eGFR 64 ml/min/1.73sq m     Narrative:      [employer] guidelines for Chronic Kidney Disease (CKD):     Stage 1 with normal or high GFR (GFR > 90 mL/min/1.73 square meters)    Stage 2 Mild CKD (GFR = 60-89 mL/min/1.73 square meters)    Stage 3A Moderate CKD (GFR = 45-59 mL/min/1.73 square meters)    Stage 3B Moderate CKD (GFR = 30-44 mL/min/1.73 square meters)    Stage 4 Severe CKD (GFR = 15-29 mL/min/1.73 square meters)    Stage 5 End Stage CKD (GFR <15 mL/min/1.73 square meters)  Note: GFR calculation is accurate only with a steady state creatinine    TSH, 3rd generation with Free T4 reflex [[id number]]  (Normal) Collected: 02/06/24 2110    Lab Status: Final result Specimen: Blood from Arm, Right Updated: 02/06/24 2212     TSH 3RD GENERATON 4.419 uIU/mL     CK [[id number]]  (Normal) Collected: 02/06/24 2110    Lab Status: Final result Specimen: Blood from Arm, Right Updated: 02/06/24 2212     Total CK 26 U/L     Phosphorus [[id number]]  (Normal) Collected: 02/06/24 2110    Lab Status: Final result Specimen: Blood from Arm, Right Updated: 02/06/24 2212     Phosphorus 3.9 mg/dL     Magnesium [[id number]]  (Normal) Collected: 02/06/24 2110    Lab Status: Final result Specimen: Blood from Arm, Right Updated: 02/06/24 2212     Magnesium 2.1 mg/dL     Levetiracetam level [[id number]] Collected: 02/06/24 2153    Lab Status: In process Specimen: Blood from Arm, Right Updated: 02/06/24 2159    HS Troponin 0hr (reflex  protocol) [[id number]]  (Normal) Collected: 02/06/24 2107    Lab Status: Final result Specimen: Blood from Arm, Right Updated: 02/06/24 2158     hs TnI 0hr <2 ng/L     Lactic acid, plasma (w/reflex if result > 2.0) [[id number]]  (Abnormal) Collected: 02/06/24 2107    Lab Status: Final result Specimen: Blood from Arm, Right Updated: 02/06/24 2150     LACTIC ACID 11.8 mmol/L     Narrative:      Result may be elevated if tourniquet was used during collection.    Protime-INR [[id number]]  (Normal) Collected: 02/06/24 2107    Lab Status: Final result Specimen: Blood from Arm, Right Updated: 02/06/24 2147     Protime 14.2 seconds      INR 1.04    APTT [[id number]]  (Normal) Collected: 02/06/24 2107    Lab Status: Final result Specimen: Blood from Arm, Right Updated: 02/06/24 2147     PTT 24 seconds     CBC and differential [[id number]]  (Abnormal) Collected: 02/06/24 2107    Lab Status: Final result Specimen: Blood from Arm, Right Updated: 02/06/24 2140     WBC 6.44 Thousand/uL      RBC 4.43 Million/uL      Hemoglobin 12.5 g/dL      Hematocrit 39.8 %      MCV 90 fL      MCH 28.2 pg      MCHC 31.4 g/dL      RDW 16.7 %      MPV 12.5 fL      Platelets 257 Thousands/uL      nRBC 0 /100 WBCs      Neutrophils Relative 78 %      Immat GRANS % 1 %      Lymphocytes Relative 14 %      Monocytes Relative 6 %      Eosinophils Relative 0 %      Basophils Relative 1 %      Neutrophils Absolute 5.01 Thousands/µL      Immature Grans Absolute 0.06 Thousand/uL      Lymphocytes Absolute 0.92 Thousands/µL      Monocytes Absolute 0.39 Thousand/µL      Eosinophils Absolute 0.02 Thousand/µL      Basophils Absolute 0.04 Thousands/µL     Fingerstick Glucose (POCT) [[id number]]  (Normal) Collected: 02/06/24 2034    Lab Status: Final result Updated: 02/06/24 2035     POC Glucose 132 mg/dl                    CT head without contrast   ED Interpretation by Deshawn Machuca PA-C (02/07 0022)   Reading Physician Reading Date Result Priority  Micky Bahena,  MD  [phone] 2/7/2024     Narrative & Impression  CT BRAIN - WITHOUT CONTRAST     INDICATION:   Seizure disorder, clinical change  hx of seizures; witnessed seizure this evening with loc.     COMPARISON: 1/14/2024.     TECHNIQUE:  CT examination of the brain was performed.  Multiplanar 2D reformatted images were created from the source data.     Radiation dose length product (DLP) for this visit:  946 mGy-cm .  This examination, like all CT scans performed in the [facility], was performed utilizing techniques to minimize radiation dose exposure, including the use of iterative   reconstruction and automated exposure control.     IMAGE QUALITY:  Diagnostic.     FINDINGS:     PARENCHYMA:  No intracranial mass, mass effect or midline shift. No CT signs of acute infarction.  No acute parenchymal hemorrhage.     VENTRICLES AND EXTRA-AXIAL SPACES: Stable moderate enlargement of the ventricles. Cerebral sulci    and cisterns appear normal in size and configuration for chronological age..     VISUALIZED ORBITS: Normal visualized orbits.     PARANASAL SINUSES: Normal visualized paranasal sinuses.     CALVARIUM AND EXTRACRANIAL SOFT TISSUES: Unremarkable.     IMPRESSION:     Stable findings without acute intracranial abnormality.                 Workstation performed: [id number]        Final Result by Micky Bahena MD (02/07 0000)      Stable findings without acute intracranial abnormality.                  Workstation performed: [id number]                    Procedures  ECG 12 Lead Documentation Only    Date/Time: 2/6/2024 9:24 PM    Performed by: Deshawn Machuca PA-C  Authorized by: Deshawn Machuca PA-C    Indications / Diagnosis:  Seizure like activity  ECG reviewed by me, the ED Provider: yes    Patient location:  ED  Previous ECG:     Previous ECG:  Compared to current    Comparison ECG info:  Compared with ECG of 9/14/2024, no significant changes are noted.    Similarity:  No change    Comparison to  "cardiac monitor: Yes    Interpretation:     Interpretation: normal    Rate:     ECG rate:  113    ECG rate assessment: normal    Rhythm:     Rhythm: sinus tachycardia    Ectopy:     Ectopy: none    QRS:     QRS axis:  Normal    QRS intervals:  Normal  Conduction:     Conduction: normal    ST segments:     ST segments:  Normal  T waves:     T waves: normal             ED Course  ED Course as of 02/07/24 0426   Tue Feb 06, 2024   2154 LACTIC ACID(!!): 11.8  IV fluids in process   Wed Feb 07, 2024   0032 Tiger text to slim                               SBIRT 22yo+      Flowsheet Row Most Recent Value   Initial Alcohol Screen: US AUDIT-C     1. How often do you have a drink containing alcohol? 0 Filed at: 02/07/2024 0359   2. How many drinks containing alcohol do you have on a typical day you are drinking?  0 Filed at: 02/07/2024 0359   3b. FEMALE Any Age, or MALE 65+: How often do you have 4 or more drinks on one occassion? 0 Filed at: 02/07/2024 0359   Audit-C Score 0 Filed at: 02/07/2024 0359   ELMO: How many times in the past year have you...    Used an illegal drug or used a prescription medication for non-medical reasons? Never Filed at: 02/07/2024 0359                      Medical Decision Making  Patient is a 78-year-old female with history of Parkinson's disease, osteopenia, pulmonary embolism surgical history of cholecystectomy, presents to the emergency department with seizure-like activity witnessed lasting approximately for 15 minutes.   Patient hemodynamically stable and afebrile  No sirs  Hyponatremia 34, hypokalemia 3.1; normal kidney function, bicarbonate 9, anion gap 27-fluid repletion  No leukocytosis, no banding, initial lactic acid 11.8 that improved with fluid hydration to 1.6  Negative UDS  Normal glucose at 140  Normal kidney function, normal CK level 26  TSH normal at 4.419  Negative COVID panel  Head CT without contrast-impression-\"stable findings without acute intracranial abnormality.  \"  ECG " with sinus tachycardia, no ischemic changes  Suspect recent increase in carbidopa levodopa potentiated patient recent seizure?  Keppra level in process; 1.5 g of Keppra IV  Seizure precautions  Discussion with patient and internal medicine and both agreed to place patient on inpatient status under the care of Dr. Santamaria, internal medicine  With reevaluation by self remaining GCS 15 alert and oriented x 3, no acute neurological findings  Patient demonstrates verbal understanding of all clinical laboratory and imaging findings, admission instructions, follow-up, and verbally agrees with current treatment plan with teach back    *Due to voice recognition software, sound alike and misspelled words may be contained in the documentation*        Amount and/or Complexity of Data Reviewed  Labs: ordered. Decision-making details documented in ED Course.  Radiology: ordered.    Risk  Prescription drug management.  Decision regarding hospitalization.             Disposition  Final diagnoses:   Hypocarbia   Seizure-like activity (HCC)   Hypokalemia   Lactic acidosis     Time reflects when diagnosis was documented in both MDM as applicable and the Disposition within this note       Time User Action Codes Description Comment    2/6/2024 10:20 PM Deshawn Machuca Add [R79.81] Hypocarbia     2/6/2024 10:20 PM Deshawn Machuca Add [R56.9] Seizure-like activity (HCC)     2/6/2024 10:20 PM Deshawn Machuca Add [E87.6] Hypokalemia     2/6/2024 10:21 PM Deshawn Machuca [E87.20] Lactic acidosis     2/7/2024  3:38 AM Deshawn Machuca Modify [R79.81] Hypocarbia     2/7/2024  3:38 AM Deshawn Machuca Modify [R56.9] Seizure-like activity (HCC)           ED Disposition       ED Disposition   Admit    Condition   Stable    Date/Time   Wed Feb 7, 2024 4616    Comment   Case was discussed with [facility] resident and the patient's admission status was agreed to be Admission Status: inpatient status to the service of , internal medicine                Follow-up Information    None         Patient's Medications   Discharge Prescriptions    No medications on file       No discharge procedures on file.    PDMP Review         Value Time User    PDMP Reviewed  Yes 2/7/2024  4:26 AM Deshawn Machuca PA-C            ED Provider  Electronically Signed by             Deshawn Machuca PA-C  02/07/24 0426

## 2024-02-07 NOTE — H&P
07/12/23 10:04 AM     VB [employer] SmartForm used to document caregap status.     Eunice Vera MA [facility]  H&P  Name: Kamilla Pat 78 y.o. female I MRN: [id number]  Unit/Bed#: ED-07 I Date of Admission: 2/6/2024   Date of Service: 2/7/2024 I Hospital Day: 0      Assessment/Plan   * Seizure-like activity (HCC)  Assessment & Plan  Patient reports having seizure-like activity for the past 3 nights  Seizure-like activity tonight lasted for around 15 minutes  Patient family reports patient was found to have generalized shaking and arm jerking and then full body shaking with loss of consciousness  No urinary or bowel incontinence reported, tongue bite present on the left side  Patient was recently admitted on January 12, due to PE and was found to have seizure in the hospital  Patient was started on Keppra 750 Mg twice daily  MRI brain without contrast on 1/14/2024 with no acute infarction, intracranial hemorrhage or mass effect.  EEG: Abnormal with Generalized irregular delta activity   Lactic acid: 11.8 - 1.6   CT head: Stable findings without acute intracranial abnormality.     PLAN:  Consult neurology   Continue keppra 750 mg twice daily           Hyponatremia  Assessment & Plan  Na: 134   Patient currently asymptomatic  Can be secondary to Keppra    PLAN:  Monitor BMP    Multiple subsegmental pulmonary emboli without acute cor pulmonale (HCC)  Assessment & Plan  Continue Eliquis 5 Mg twice daily    Pure hypercholesterolemia  Assessment & Plan  Continue ezetimibe    Parkinson disease  Assessment & Plan  Continue Sinemet         VTE Pharmacologic Prophylaxis: VTE Score: 7 High Risk (Score >/= 5) - Pharmacological DVT Prophylaxis Ordered: apixaban (Eliquis). Sequential Compression Devices Ordered.  Code Status: Level 1 - Full Code   Discussion with family:  Morning team will update the family.     Anticipated Length of Stay: Patient will be admitted on an inpatient basis with an anticipated length of stay of greater than 2 midnights secondary to management of seizure.    Total Time  Spent on Date of Encounter in care of patient: 45 mins. This time was spent on one or more of the following: performing physical exam; counseling and coordination of care; obtaining or reviewing history; documenting in the medical record; reviewing/ordering tests, medications or procedures; communicating with other healthcare professionals and discussing with patient's family/caregivers.    Chief Complaint: seizure like activity     History of Present Illness:  Kamilla Pat is a 78 y.o. female with a PMH of Parkinson's disease, osteopenia, pulmonary embolism and seizures who presents with seizure-like activity for 15 minutes.  Patient reports having seizure-like activity for the past 3 nights however this one lasted for over 15 minutes.  Patient family reports patient having tonic-clonic movements starting from her mouth to her arm and then the whole body.  Patient was recently admitted on January 12, was found to have pulmonary embolism along with seizure-like activity which occurred in the hospital.  Patient was seen by neurology during that admission.  Patient was started on Keppra 750 Mg twice daily.  Imaging including MRI showed no acute infarction or intracranial hemorrhage.  EEG was also done which showed irregular delta activity.  Patient has been compliant with the medication.  Patient is being admitted under [facility] service for management of seizure-like activity.    Review of Systems:  Review of Systems   Constitutional:  Positive for activity change and fatigue.   HENT: Negative.     Eyes: Negative.    Respiratory: Negative.     Cardiovascular: Negative.    Gastrointestinal: Negative.    Endocrine: Negative.    Genitourinary: Negative.    Musculoskeletal: Negative.    Skin: Negative.    Allergic/Immunologic: Negative.    Neurological:  Positive for tremors.   Hematological: Negative.    Psychiatric/Behavioral: Negative.         Past Medical and Surgical History:   Past Medical History:   Diagnosis Date     Anxiety     Hepatitis C 04/01/2017    screening Negative    High cholesterol     Hip pain     History of low potassium     Lower extremity edema     Obesity     Osteopenia     Overweight     Thigh pain        Past Surgical History:   Procedure Laterality Date    BLADDER SURGERY      CHOLECYSTECTOMY      COLOGUARD (HISTORICAL)  07/02/2018    COLONOSCOPY  01/01/2007    DXA PROCEDURE (HISTORICAL)  03/27/2014    MAMMO (HISTORICAL)  08/31/2016    REDUCTION MAMMAPLASTY      REPLACEMENT TOTAL KNEE Bilateral     TOTAL HIP ARTHROPLASTY Left 01/01/2015    TOTAL KNEE ARTHROPLASTY Left        Meds/Allergies:  Prior to Admission medications    Medication Sig Start Date End Date Taking? Authorizing Provider   acetaminophen (TYLENOL) 325 mg tablet Take 325 mg by mouth every 6 (six) hours as needed for mild pain    Historical Provider, MD   apixaban (Eliquis) 5 mg Take 2 tablets (10 mg total) by mouth 2 (two) times a day for 7 days, THEN 1 tablet (5 mg total) 2 (two) times a day for 21 days. 1/12/24 2/9/24  Jonathan Jenkins MD   Calcium Carbonate-Vitamin D (CALCIUM-CARB 600 + D PO) Take 1 tablet by mouth daily    Historical Provider, MD   carbidopa-levodopa (SINEMET)  mg per tablet Take 2 tablets by mouth 4 (four) times a day for 7 days 1/16/24 1/23/24  Faina Urrutia MD   Cholecalciferol 50 MCG (2000 UT) CAPS Take 1 capsule by mouth daily    Historical Provider, MD   Diclofenac Sodium (VOLTAREN) 1 % Apply 2 g topically 4 (four) times a day 8/15/23   [facility] Cindi Christianson MD   ezetimibe (ZETIA) 10 mg tablet Take by mouth Daily 4/1/19   Historical Provider, MD   levETIRAcetam (KEPPRA) 750 mg tablet Take 1 tablet (750 mg total) by mouth every 12 (twelve) hours 1/16/24 2/15/24  Faina Urrutia MD   potassium chloride (MICRO-K) 10 MEQ CR capsule Take 20 mEq by mouth Daily Per [facility] note from Nahomi Sandoval,  - 12/19/2023 10:10 AM EST 20 meq.  4/1/19   Historical Provider, MD   triamterene-hydrochlorothiazide (DYAZIDE) 37.5-25 mg per  capsule Take 1 capsule by mouth Daily 4/1/19   Historical Provider, MD SALDANA have reviewed home medications with patient personally.    Allergies:   Allergies   Allergen Reactions    Hydrocodone-Acetaminophen GI Intolerance    Hydromorphone GI Intolerance    Oxycodone-Acetaminophen GI Intolerance    Statins Myalgia       Social History:  Marital Status: /Civil Union   Occupation: N/A  Patient Pre-hospital Living Situation: Home  Patient Pre-hospital Level of Mobility: unable to be assessed at time of evaluation  Patient Pre-hospital Diet Restrictions: N/A  Substance Use History:   Social History     Substance and Sexual Activity   Alcohol Use Yes    Comment: social, Occasional      Social History     Tobacco Use   Smoking Status Never   Smokeless Tobacco Never     Social History     Substance and Sexual Activity   Drug Use Never       Family History:  Family History   Problem Relation Age of Onset    Glaucoma Mother     Hypertension Mother     Glaucoma Father     Leukemia Father     Hypertension Father        Physical Exam:     Vitals:   Blood Pressure: 154/75 (02/07/24 0130)  Pulse: 87 (02/07/24 0130)  Temperature: 98.7 °F (37.1 °C) (02/06/24 2033)  Temp Source: Axillary (02/06/24 2033)  Respirations: 18 (02/07/24 0130)  SpO2: 96 % (02/07/24 0130)    Physical Exam  Constitutional:       Appearance: She is ill-appearing.   HENT:      Head: Normocephalic and atraumatic.      Right Ear: External ear normal.      Left Ear: External ear normal.      Nose: Nose normal.      Mouth/Throat:      Pharynx: Oropharynx is clear.      Comments: Tongue bite present on the left side  Eyes:      Extraocular Movements: Extraocular movements intact.      Conjunctiva/sclera: Conjunctivae normal.      Pupils: Pupils are equal, round, and reactive to light.   Cardiovascular:      Rate and Rhythm: Normal rate and regular rhythm.      Pulses: Normal pulses.      Heart sounds: Normal heart sounds.   Pulmonary:      Effort: Pulmonary  effort is normal.      Breath sounds: Normal breath sounds.   Abdominal:      General: Abdomen is flat. Bowel sounds are normal.      Palpations: Abdomen is soft.   Musculoskeletal:         General: Normal range of motion.      Cervical back: Normal range of motion.      Right lower leg: No edema.      Left lower leg: No edema.   Skin:     General: Skin is warm.      Capillary Refill: Capillary refill takes less than 2 seconds.   Neurological:      General: No focal deficit present.      Mental Status: She is oriented to person, place, and time.          Additional Data:     Lab Results:  Results from last 7 days   Lab Units 02/06/24  2107   WBC Thousand/uL 6.44   HEMOGLOBIN g/dL 12.5   HEMATOCRIT % 39.8   PLATELETS Thousands/uL 257   NEUTROS PCT % 78*   LYMPHS PCT % 14   MONOS PCT % 6   EOS PCT % 0     Results from last 7 days   Lab Units 02/06/24  2110   SODIUM mmol/L 134*   POTASSIUM mmol/L 3.1*   CHLORIDE mmol/L 98   CO2 mmol/L 9*   BUN mg/dL 10   CREATININE mg/dL 0.88   ANION GAP mmol/L 27   CALCIUM mg/dL 9.5   ALBUMIN g/dL 4.4   TOTAL BILIRUBIN mg/dL 1.28*   ALK PHOS U/L 92   ALT U/L <3*   AST U/L 7*   GLUCOSE RANDOM mg/dL 140     Results from last 7 days   Lab Units 02/06/24  2107   INR  1.04     Results from last 7 days   Lab Units 02/06/24  2034   POC GLUCOSE mg/dl 132         Results from last 7 days   Lab Units 02/06/24  2340 02/06/24  2107   LACTIC ACID mmol/L 1.6 11.8*       Lines/Drains:  Invasive Devices       Peripheral Intravenous Line  Duration             Peripheral IV 02/06/24 Left Antecubital 1 day    Peripheral IV 02/06/24 Right Antecubital <1 day                        Imaging: Reviewed radiology reports from this admission including: CT head  CT head without contrast   ED Interpretation by Deshawn Machuca PA-C (02/07 0022)   Reading Physician Reading Date Result Priority  Micky Bahena MD  [phone] 2/7/2024     Narrative & Impression  CT BRAIN - WITHOUT CONTRAST     INDICATION:   Seizure  disorder, clinical change  hx of seizures; witnessed seizure this evening with loc.     COMPARISON: 1/14/2024.     TECHNIQUE:  CT examination of the brain was performed.  Multiplanar 2D reformatted images were created from the source data.     Radiation dose length product (DLP) for this visit:  946 mGy-cm .  This examination, like all CT scans performed in the [facility], was performed utilizing techniques to minimize radiation dose exposure, including the use of iterative   reconstruction and automated exposure control.     IMAGE QUALITY:  Diagnostic.     FINDINGS:     PARENCHYMA:  No intracranial mass, mass effect or midline shift. No CT signs of acute infarction.  No acute parenchymal hemorrhage.     VENTRICLES AND EXTRA-AXIAL SPACES: Stable moderate enlargement of the ventricles. Cerebral sulci    and cisterns appear normal in size and configuration for chronological age..     VISUALIZED ORBITS: Normal visualized orbits.     PARANASAL SINUSES: Normal visualized paranasal sinuses.     CALVARIUM AND EXTRACRANIAL SOFT TISSUES: Unremarkable.     IMPRESSION:     Stable findings without acute intracranial abnormality.                 Workstation performed: [id number]        Final Result by Micky Bahena MD (02/07 0000)      Stable findings without acute intracranial abnormality.                  Workstation performed: [id number]             EKG and Other Studies Reviewed on Admission:   EKG: NSR. HR 87.    ** Please Note: This note has been constructed using a voice recognition system. **

## 2024-02-08 ENCOUNTER — HOSPITAL ENCOUNTER (INPATIENT)
Facility: HOSPITAL | Age: 79
LOS: 3 days | DRG: 101 | End: 2024-02-11
Attending: EMERGENCY MEDICINE | Admitting: HOSPITALIST
Payer: MEDICARE

## 2024-02-08 VITALS
SYSTOLIC BLOOD PRESSURE: 146 MMHG | TEMPERATURE: 97.9 F | DIASTOLIC BLOOD PRESSURE: 83 MMHG | OXYGEN SATURATION: 94 % | HEART RATE: 83 BPM | RESPIRATION RATE: 18 BRPM

## 2024-02-08 DIAGNOSIS — R56.9 SEIZURE-LIKE ACTIVITY (HCC): Primary | Chronic | ICD-10-CM

## 2024-02-08 PROBLEM — N18.2 CKD (CHRONIC KIDNEY DISEASE) STAGE 2, GFR 60-89 ML/MIN: Status: ACTIVE | Noted: 2024-02-08

## 2024-02-08 PROBLEM — F41.9 ANXIETY: Status: ACTIVE | Noted: 2024-02-08

## 2024-02-08 PROBLEM — D72.829 ELEVATED WBC COUNT: Status: ACTIVE | Noted: 2024-02-08

## 2024-02-08 PROBLEM — Z86.711 HISTORY OF PULMONARY EMBOLISM: Status: ACTIVE | Noted: 2024-02-08

## 2024-02-08 LAB
ALBUMIN SERPL BCP-MCNC: 4.4 G/DL (ref 3.5–5)
ALP SERPL-CCNC: 87 U/L (ref 34–104)
ALT SERPL W P-5'-P-CCNC: <3 U/L (ref 7–52)
ANION GAP SERPL CALCULATED.3IONS-SCNC: 10 MMOL/L
ANION GAP SERPL CALCULATED.3IONS-SCNC: 25 MMOL/L
ANION GAP SERPL CALCULATED.3IONS-SCNC: 9 MMOL/L
APTT PPP: 23 SECONDS (ref 23–37)
AST SERPL W P-5'-P-CCNC: 6 U/L (ref 13–39)
BASOPHILS # BLD AUTO: 0.06 THOUSANDS/ÂΜL (ref 0–0.1)
BASOPHILS NFR BLD AUTO: 1 % (ref 0–1)
BILIRUB SERPL-MCNC: 1.18 MG/DL (ref 0.2–1)
BUN SERPL-MCNC: 6 MG/DL (ref 5–25)
BUN SERPL-MCNC: 8 MG/DL (ref 5–25)
BUN SERPL-MCNC: 8 MG/DL (ref 5–25)
CALCIUM SERPL-MCNC: 8.9 MG/DL (ref 8.4–10.2)
CALCIUM SERPL-MCNC: 9 MG/DL (ref 8.4–10.2)
CALCIUM SERPL-MCNC: 9.6 MG/DL (ref 8.4–10.2)
CHLORIDE SERPL-SCNC: 101 MMOL/L (ref 96–108)
CHLORIDE SERPL-SCNC: 104 MMOL/L (ref 96–108)
CHLORIDE SERPL-SCNC: 105 MMOL/L (ref 96–108)
CO2 SERPL-SCNC: 10 MMOL/L (ref 21–32)
CO2 SERPL-SCNC: 22 MMOL/L (ref 21–32)
CO2 SERPL-SCNC: 22 MMOL/L (ref 21–32)
CREAT SERPL-MCNC: 0.62 MG/DL (ref 0.6–1.3)
CREAT SERPL-MCNC: 0.69 MG/DL (ref 0.6–1.3)
CREAT SERPL-MCNC: 0.87 MG/DL (ref 0.6–1.3)
EOSINOPHIL # BLD AUTO: 0.1 THOUSAND/ÂΜL (ref 0–0.61)
EOSINOPHIL NFR BLD AUTO: 1 % (ref 0–6)
ERYTHROCYTE [DISTWIDTH] IN BLOOD BY AUTOMATED COUNT: 16.9 % (ref 11.6–15.1)
ERYTHROCYTE [DISTWIDTH] IN BLOOD BY AUTOMATED COUNT: 17 % (ref 11.6–15.1)
EST. AVERAGE GLUCOSE BLD GHB EST-MCNC: 103 MG/DL
GFR SERPL CREATININE-BSD FRML MDRD: 64 ML/MIN/1.73SQ M
GFR SERPL CREATININE-BSD FRML MDRD: 84 ML/MIN/1.73SQ M
GFR SERPL CREATININE-BSD FRML MDRD: 87 ML/MIN/1.73SQ M
GLUCOSE SERPL-MCNC: 135 MG/DL (ref 65–140)
GLUCOSE SERPL-MCNC: 85 MG/DL (ref 65–140)
GLUCOSE SERPL-MCNC: 97 MG/DL (ref 65–140)
HBA1C MFR BLD: 5.2 %
HCT VFR BLD AUTO: 33.8 % (ref 34.8–46.1)
HCT VFR BLD AUTO: 39.8 % (ref 34.8–46.1)
HGB BLD-MCNC: 10.9 G/DL (ref 11.5–15.4)
HGB BLD-MCNC: 12.3 G/DL (ref 11.5–15.4)
IMM GRANULOCYTES # BLD AUTO: 0.06 THOUSAND/UL (ref 0–0.2)
IMM GRANULOCYTES NFR BLD AUTO: 1 % (ref 0–2)
INR PPP: 1.26 (ref 0.84–1.19)
LYMPHOCYTES # BLD AUTO: 1.11 THOUSANDS/ÂΜL (ref 0.6–4.47)
LYMPHOCYTES NFR BLD AUTO: 10 % (ref 14–44)
MCH RBC QN AUTO: 27.7 PG (ref 26.8–34.3)
MCH RBC QN AUTO: 27.9 PG (ref 26.8–34.3)
MCHC RBC AUTO-ENTMCNC: 30.9 G/DL (ref 31.4–37.4)
MCHC RBC AUTO-ENTMCNC: 32.2 G/DL (ref 31.4–37.4)
MCV RBC AUTO: 86 FL (ref 82–98)
MCV RBC AUTO: 90 FL (ref 82–98)
MONOCYTES # BLD AUTO: 0.91 THOUSAND/ÂΜL (ref 0.17–1.22)
MONOCYTES NFR BLD AUTO: 8 % (ref 4–12)
NEUTROPHILS # BLD AUTO: 8.74 THOUSANDS/ÂΜL (ref 1.85–7.62)
NEUTS SEG NFR BLD AUTO: 79 % (ref 43–75)
NRBC BLD AUTO-RTO: 0 /100 WBCS
PLATELET # BLD AUTO: 228 THOUSANDS/UL (ref 149–390)
PLATELET # BLD AUTO: 277 THOUSANDS/UL (ref 149–390)
PMV BLD AUTO: 11.8 FL (ref 8.9–12.7)
PMV BLD AUTO: 12.8 FL (ref 8.9–12.7)
POTASSIUM SERPL-SCNC: 3.2 MMOL/L (ref 3.5–5.3)
POTASSIUM SERPL-SCNC: 3.4 MMOL/L (ref 3.5–5.3)
POTASSIUM SERPL-SCNC: 3.8 MMOL/L (ref 3.5–5.3)
PROT SERPL-MCNC: 7.1 G/DL (ref 6.4–8.4)
PROTHROMBIN TIME: 16.5 SECONDS (ref 11.6–14.5)
RBC # BLD AUTO: 3.91 MILLION/UL (ref 3.81–5.12)
RBC # BLD AUTO: 4.44 MILLION/UL (ref 3.81–5.12)
SODIUM SERPL-SCNC: 135 MMOL/L (ref 135–147)
SODIUM SERPL-SCNC: 136 MMOL/L (ref 135–147)
SODIUM SERPL-SCNC: 137 MMOL/L (ref 135–147)
VIT B12 SERPL-MCNC: 153 PG/ML (ref 180–914)
WBC # BLD AUTO: 10.98 THOUSAND/UL (ref 4.31–10.16)
WBC # BLD AUTO: 5.12 THOUSAND/UL (ref 4.31–10.16)

## 2024-02-08 PROCEDURE — 85730 THROMBOPLASTIN TIME PARTIAL: CPT | Performed by: EMERGENCY MEDICINE

## 2024-02-08 PROCEDURE — 99284 EMERGENCY DEPT VISIT MOD MDM: CPT

## 2024-02-08 PROCEDURE — 82607 VITAMIN B-12: CPT

## 2024-02-08 PROCEDURE — 80048 BASIC METABOLIC PNL TOTAL CA: CPT

## 2024-02-08 PROCEDURE — 99285 EMERGENCY DEPT VISIT HI MDM: CPT | Performed by: EMERGENCY MEDICINE

## 2024-02-08 PROCEDURE — 99239 HOSP IP/OBS DSCHRG MGMT >30: CPT | Performed by: INTERNAL MEDICINE

## 2024-02-08 PROCEDURE — 96374 THER/PROPH/DIAG INJ IV PUSH: CPT

## 2024-02-08 PROCEDURE — 85025 COMPLETE CBC W/AUTO DIFF WBC: CPT | Performed by: EMERGENCY MEDICINE

## 2024-02-08 PROCEDURE — 93005 ELECTROCARDIOGRAM TRACING: CPT

## 2024-02-08 PROCEDURE — 36415 COLL VENOUS BLD VENIPUNCTURE: CPT | Performed by: EMERGENCY MEDICINE

## 2024-02-08 PROCEDURE — 83036 HEMOGLOBIN GLYCOSYLATED A1C: CPT

## 2024-02-08 PROCEDURE — 95819 EEG AWAKE AND ASLEEP: CPT | Performed by: PSYCHIATRY & NEUROLOGY

## 2024-02-08 PROCEDURE — 85610 PROTHROMBIN TIME: CPT | Performed by: EMERGENCY MEDICINE

## 2024-02-08 PROCEDURE — 99223 1ST HOSP IP/OBS HIGH 75: CPT | Performed by: HOSPITALIST

## 2024-02-08 PROCEDURE — 80053 COMPREHEN METABOLIC PANEL: CPT | Performed by: EMERGENCY MEDICINE

## 2024-02-08 PROCEDURE — 97163 PT EVAL HIGH COMPLEX 45 MIN: CPT

## 2024-02-08 PROCEDURE — 85027 COMPLETE CBC AUTOMATED: CPT

## 2024-02-08 RX ORDER — POTASSIUM CHLORIDE 20 MEQ/1
40 TABLET, EXTENDED RELEASE ORAL ONCE
Status: COMPLETED | OUTPATIENT
Start: 2024-02-08 | End: 2024-02-08

## 2024-02-08 RX ORDER — LEVETIRACETAM 500 MG/5ML
2000 INJECTION, SOLUTION, CONCENTRATE INTRAVENOUS ONCE
Status: COMPLETED | OUTPATIENT
Start: 2024-02-08 | End: 2024-02-08

## 2024-02-08 RX ORDER — POTASSIUM CHLORIDE 20 MEQ/1
40 TABLET, EXTENDED RELEASE ORAL ONCE
Status: CANCELLED | OUTPATIENT
Start: 2024-02-08

## 2024-02-08 RX ORDER — MELATONIN
1000 DAILY
Status: DISCONTINUED | OUTPATIENT
Start: 2024-02-09 | End: 2024-02-11 | Stop reason: HOSPADM

## 2024-02-08 RX ORDER — POTASSIUM CHLORIDE 750 MG/1
20 CAPSULE, EXTENDED RELEASE ORAL DAILY
Status: DISCONTINUED | OUTPATIENT
Start: 2024-02-09 | End: 2024-02-08

## 2024-02-08 RX ORDER — POTASSIUM CHLORIDE 20 MEQ/1
20 TABLET, EXTENDED RELEASE ORAL DAILY
Status: DISCONTINUED | OUTPATIENT
Start: 2024-02-09 | End: 2024-02-11 | Stop reason: HOSPADM

## 2024-02-08 RX ORDER — LEVETIRACETAM 1000 MG/1
1000 TABLET ORAL EVERY 12 HOURS SCHEDULED
Qty: 60 TABLET | Refills: 0 | Status: SHIPPED | OUTPATIENT
Start: 2024-02-08 | End: 2024-02-15

## 2024-02-08 RX ORDER — EZETIMIBE 10 MG/1
10 TABLET ORAL
Status: DISCONTINUED | OUTPATIENT
Start: 2024-02-08 | End: 2024-02-11 | Stop reason: HOSPADM

## 2024-02-08 RX ORDER — POTASSIUM CHLORIDE 20 MEQ/1
40 TABLET, EXTENDED RELEASE ORAL ONCE
Status: CANCELLED | OUTPATIENT
Start: 2024-02-08 | End: 2024-02-08

## 2024-02-08 RX ORDER — LORAZEPAM 2 MG/ML
1 INJECTION INTRAMUSCULAR EVERY 6 HOURS PRN
Status: DISCONTINUED | OUTPATIENT
Start: 2024-02-08 | End: 2024-02-11 | Stop reason: HOSPADM

## 2024-02-08 RX ORDER — LEVETIRACETAM 500 MG/1
1000 TABLET ORAL EVERY 12 HOURS SCHEDULED
Status: DISCONTINUED | OUTPATIENT
Start: 2024-02-08 | End: 2024-02-10

## 2024-02-08 RX ORDER — CYANOCOBALAMIN 1000 UG/ML
1000 INJECTION, SOLUTION INTRAMUSCULAR; SUBCUTANEOUS DAILY
Status: DISCONTINUED | OUTPATIENT
Start: 2024-02-09 | End: 2024-02-09

## 2024-02-08 RX ORDER — TRIAMTERENE AND HYDROCHLOROTHIAZIDE 37.5; 25 MG/1; MG/1
1 TABLET ORAL DAILY
Status: DISCONTINUED | OUTPATIENT
Start: 2024-02-09 | End: 2024-02-11 | Stop reason: HOSPADM

## 2024-02-08 RX ADMIN — EZETIMIBE 10 MG: 10 TABLET ORAL at 21:37

## 2024-02-08 RX ADMIN — APIXABAN 5 MG: 5 TABLET, FILM COATED ORAL at 09:07

## 2024-02-08 RX ADMIN — LEVETIRACETAM 2000 MG: 100 INJECTION, SOLUTION INTRAVENOUS at 16:10

## 2024-02-08 RX ADMIN — CARBIDOPA AND LEVODOPA 3 TABLET: 25; 100 TABLET ORAL at 09:07

## 2024-02-08 RX ADMIN — LEVETIRACETAM 1000 MG: 500 TABLET, FILM COATED ORAL at 21:38

## 2024-02-08 RX ADMIN — APIXABAN 5 MG: 5 TABLET, FILM COATED ORAL at 18:46

## 2024-02-08 RX ADMIN — POTASSIUM CHLORIDE 10 MEQ: 1.5 SOLUTION ORAL at 09:07

## 2024-02-08 RX ADMIN — CARBIDOPA AND LEVODOPA 3 TABLET: 25; 100 TABLET ORAL at 18:46

## 2024-02-08 RX ADMIN — LEVETIRACETAM 1000 MG: 500 TABLET, FILM COATED ORAL at 09:07

## 2024-02-08 RX ADMIN — Medication 1000 UNITS: at 09:07

## 2024-02-08 RX ADMIN — CARBIDOPA AND LEVODOPA 3 TABLET: 25; 100 TABLET ORAL at 23:29

## 2024-02-08 RX ADMIN — CARBIDOPA AND LEVODOPA 3 TABLET: 25; 100 TABLET ORAL at 13:07

## 2024-02-08 RX ADMIN — POTASSIUM CHLORIDE 40 MEQ: 1500 TABLET, EXTENDED RELEASE ORAL at 09:07

## 2024-02-08 RX ADMIN — Medication 1 TABLET: at 09:07

## 2024-02-08 NOTE — OCCUPATIONAL THERAPY NOTE
"  Occupational Therapy Cancellation     02/08/24 1214   OT Last Visit   OT Visit Date 02/08/24   Note Type   Note type Cancelled Session   Additional Comments OT consult received. Chart reviewed and RN cleared pt to be evaluated. Pt just finishing with PT evaluation and returned to bed after being up OOB for a quite some time this AM. Pt reports feeling as though OT services not needed at this time as pt's spouse assists with ADLs as needed. Furthermore, PT reports pt \"did well\" mobilizing and states, \"I believe she is safe to go home.\" Pt agreeable to have this writer follow up later this afternoon if not discharged. Will f/u as schedule permits.     Tayler Rodriguez, OT    "

## 2024-02-08 NOTE — ASSESSMENT & PLAN NOTE
Lab Results   Component Value Date    SODIUM 137 02/07/2024    SODIUM 134 (L) 02/06/2024    SODIUM 131 (L) 01/16/2024     Na: 134   Patient currently asymptomatic  Can be secondary to Keppra  Monitor BMP daily while inpatient

## 2024-02-08 NOTE — PHYSICAL THERAPY NOTE
PT EVALUATION    NAME:  Kamilla Pat  AGE:   78 y.o.  Mrn:   [id number]  Length Of Stay: 1    ADMIT DX:  Hypokalemia [E87.6]  Lactic acidosis [E87.20]  Seizure (HCC) [R56.9]  Hypocarbia [R79.81]  Seizure-like activity (HCC) [R56.9]    Past Medical History:   Diagnosis Date    Anxiety     Hepatitis C 04/01/2017    screening Negative    High cholesterol     Hip pain     History of low potassium     Hyponatremia     Lower extremity edema     Obesity     Osteopenia     Overweight     Thigh pain      Past Surgical History:   Procedure Laterality Date    BLADDER SURGERY      CHOLECYSTECTOMY      COLOGUARD (HISTORICAL)  07/02/2018    COLONOSCOPY  01/01/2007    DXA PROCEDURE (HISTORICAL)  03/27/2014    MAMMO (HISTORICAL)  08/31/2016    REDUCTION MAMMAPLASTY      REPLACEMENT TOTAL KNEE Bilateral     TOTAL HIP ARTHROPLASTY Left 01/01/2015    TOTAL KNEE ARTHROPLASTY Left        Performed at least 2 patient identifiers during session: Name, Birthday, and ID bracelet       02/08/24 1220   PT Last Visit   PT Visit Date 02/08/24   Note Type   Note type Evaluation   Pain Assessment   Pain Assessment Tool 0-10   Pain Score No Pain   Restrictions/Precautions   Other Precautions Fall Risk;Bed Alarm;Chair Alarm   Home Living   Type of Home House  (Bilevel)   Home Layout Able to live on main level with bedroom/bathroom;Performs ADLs on one level;1/2 bath on main level  (1 small step to enter; patient has a first-floor set up; 6 steps with a rail to the basement; 6+6 steps with a rail to the second floor)   Bathroom Shower/Tub Tub/shower unit   Bathroom Toilet Standard   Bathroom Equipment Grab bars in shower;Tub transfer bench   Bathroom Accessibility Other (Comment)  (Half bath is assessable; full bath is not accessible)   Home Equipment Cane  (Roller walker)   Prior Function   Level of [place] Needs assistance with ADLs;Needs assistance with functional mobility;Needs assistance with IADLS   Lives With Spouse   Receives  "Help From Family   IADLs Family/Friend/Other provides meals;Family/Friend/Other provides medication management;Family/Friend/Other provides transportation   Falls in the last 6 months 0  (Denies)   Comments Patient ambulating with a roller walker prior to admission   General   Additional Pertinent History Patient is admitted with seizure like activity lasting 15 minutes.   Family/Caregiver Present Yes  (Patient's spouse)   Cognition   Overall Cognitive Status WFL   Arousal/Participation Cooperative   Orientation Level Oriented X4   Following Commands Follows multistep commands without difficulty   Subjective   Subjective \"Tired\"   RLE Assessment   RLE Assessment WFL  (3-3+/5)   LLE Assessment   LLE Assessment WFL  (3-3+/5)   Vision-Basic Assessment   Current Vision Wears contacts  (Or wears glasses)   Bed Mobility   Supine to Sit 5  Supervision   Additional items Assist x 1;Verbal cues;Increased time required;Bedrails   Sit to Supine 4  Minimal assistance   Additional items LE management;Assist x 1;Verbal cues;Increased time required   Transfers   Sit to Stand 4  Minimal assistance  (CGA)   Additional items Assist x 1;Verbal cues;Increased time required  (Cues for safe hand placement)   Stand to Sit 4  Minimal assistance  (CGA)   Additional items Assist x 1;Verbal cues;Increased time required  (Cues for safe hand placement)   Toilet transfer 4  Minimal assistance  (CGA)   Additional items Assist x 1;Armrests;Verbal cues;Increased time required  (Patient is independent with hygiene after urination)   Ambulation/Elevation   Gait pattern Short stride;Step through pattern;Decreased foot clearance;Decreased heel strike   Gait Assistance 4  Minimal assist  (CGA)   Additional items Assist x 1;Verbal cues;Tactile cues   Assistive Device Rolling walker   Distance 50 feet with change in direction -patient then returned to bed, declining further ambulation or out of bed to chair secondary to fatigue   Balance   Static Sitting " Good   Dynamic Sitting Fair +   Static Standing Fair  (With roller walker)   Ambulatory Fair  (With roller walker)   Activity Tolerance   Activity Tolerance Patient tolerated treatment well;Patient limited by fatigue   Medical Staff Made Aware Alyssa Pisano CM   Nurse Made Aware MARY Montiel   Assessment   Problem List Decreased strength;Decreased endurance;Impaired balance;Decreased mobility;Decreased coordination;Decreased safety awareness   Assessment Patient seen for Physical Therapy evaluation. Patient admitted with Seizure-like activity (HCC).  Comorbidities affecting patient's physical performance include: Parkinson's, PE, lower extremity edema, CKD 3, generalized weakness.  Personal factors affecting patient at time of initial evaluation include: lives in bilevel [place] house, ambulating with assistive device, stairs to enter home, inability to navigate community distances, inability to navigate level surfaces without external assistance, and inability to perform dynamic tasks in community. Prior to admission, patient was independent with functional mobility with RW, requiring assist for ADLS, requiring assist for IADLS, living with spouse in a bilevel level home with 1 steps to enter, and ambulating household distance.  Please find objective findings from Physical Therapy assessment regarding body systems outlined above with impairments and limitations including weakness, impaired balance, decreased endurance, impaired coordination, gait deviations, decreased activity tolerance, decreased functional mobility tolerance, decreased safety awareness, and fall risk.  The Barthel Index was used as a functional outcome tool presenting with a score of Barthel Index Score: 45 today indicating marked limitations of functional mobility and ADLS.  Patient's clinical presentation is currently unstable/unpredictable as seen in patient's presentation of vital sign response, increased fall risk, new onset of impairment of  functional mobility, decreased endurance, and new onset of weakness. Pt would benefit from continued Physical Therapy treatment to address deficits as defined above and maximize level of functional mobility. As demonstrated by objective findings, the assigned level of complexity for this evaluation is high.    The patient's AM-PAC Basic Mobility Inpatient Short Form Raw Score is 17. A Raw score of greater than 16 suggests the patient may benefit from discharge to home. Please also refer to the recommendation of the Physical Therapist for safe discharge planning.   Goals   Patient Goals To go home   STG Expiration Date 02/18/24   Short Term Goal #1 Patient will: Increase bilateral LE strength 1/2 grade to facilitate independent mobility, Perform all bed mobility tasks independently to improve pt's independence w/ repositioning for decrease risk of skin breakdown, Perform all transfers independently consistently from various height surfaces in order to improve I w/ engagement w/ real-world environments/situations, Ambulate at least 150+ ft. with least restrictive assistive device independently w/o LOB to facilitate return and engagement w/ previous living environment, Navigate 6+6 stairs w/ supervision with unilateral handrail to either improve independence w/ entering home and/or so patient can fully access living areas in home, Increase ambulatory and static and dynamic standing balance 1 grade to decrease risk for falls, Tolerate at least 15 consecutive minutes of activity to demonstrate improved activity tolerance and endurance, and Tolerate 3 hr OOB to faciliate upright tolerance   Plan   Treatment/Interventions ADL retraining;Functional transfer training;LE strengthening/ROM;Elevations;Therapeutic exercise;Endurance training;Patient/family training;Equipment eval/education;Bed mobility;Gait training;Compensatory technique education;Spoke to nursing;Spoke to MD;Spoke to case management;OT   PT Frequency 3-5x/wk    Discharge Recommendation   Rehab Resource Intensity Level, PT III (Minimum Resource Intensity)   AM-PAC Basic Mobility Inpatient   Turning in Flat Bed Without Bedrails 3   Lying on Back to Sitting on Edge of Flat Bed Without Bedrails 3   Moving Bed to Chair 3   Standing Up From Chair Using Arms 3   Walk in Room 3   Climb 3-5 Stairs With Railing 2   Basic Mobility Inpatient Raw Score 17   Basic Mobility Standardized Score 39.67   Highest Level Of Mobility   -HLM Goal 5: Stand one or more mins   JH-HLM Achieved 7: Walk 25 feet or more   Barthel Index   Feeding 5   Bathing 0   Grooming Score 0   Dressing Score 5   Bladder Score 5   Bowels Score 10   Toilet Use Score 5   Transfers (Bed/Chair) Score 10   Mobility (Level Surface) Score 0   Stairs Score 5   Barthel Index Score 45   End of Consult   Patient Position at End of Consult Supine;All needs within reach;Bed/Chair alarm activated   Licensure   [place] License Number  Deidre L Samanta, PT   Portions of the documentation may have been created using voice recognition software. Occasional wrong word or sound alike substitutions may have occurred due to the inherent limitation of the voice recognition software. Read the chart carefully and recognize, using context, where substitutions have occurred.

## 2024-02-08 NOTE — ASSESSMENT & PLAN NOTE
Patient reports having seizure-like activity for the past 3 nights  Seizure-like activity tonight lasted for around 15 minutes  Patient family reports patient was found to have generalized shaking and arm jerking and then full body shaking with loss of consciousness  No urinary or bowel incontinence reported, tongue bite present on the left side  Patient was recently admitted on January 12, due to PE and was found to have seizure in the hospital  Patient was started on Keppra 750 Mg twice daily  MRI brain without contrast on 1/14/2024 with no acute infarction, intracranial hemorrhage or mass effect.  1/14: EEG: Abnormal with Generalized irregular delta activity   Lactic acid: 11.8 - 1.6     Workups:  CT head: Stable findings without acute intracranial abnormality.   MRI w/ con: Stable MRI of the brain with no acute intracranial abnormality. 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  EEG: This Routine EEG recorded during wakefulness, drowsiness, and sleep is abnormal. Background activities and posteriorly dominant rhythm are mildly too slow, suggesting mild diffuse cerebral dysfunction of nonspecific etiology.     PLAN:  Consult neurology. Appreciated recs.  Continue Keppra 1000 mg twice daily  Seizure precaution reviewed with patient and  at bedside.  Outpatient follow-up with neurologist.

## 2024-02-08 NOTE — ASSESSMENT & PLAN NOTE
Patient has history of anxiety.  Thinks that anxiety is provoking seizures.  Currently not on any medications.  Pulse: 97    Plan:  Consider Atarax

## 2024-02-08 NOTE — CASE MANAGEMENT
Case Management Assessment & Discharge Planning Note    Patient name Kamilla Pat  Location S /S -01 MRN [id number]  : 1945 Date 2024       Current Admission Date: 2024  Current Admission Diagnosis:Seizure-like activity (HCC)   Patient Active Problem List    Diagnosis Date Noted    Generalized weakness 2024    Seizure-like activity (HCC) 2024    Ambulatory dysfunction 2024    Multiple subsegmental pulmonary emboli without acute cor pulmonale (HCC) 2024    Pure hypercholesterolemia 06/10/2020    Edema, lower extremity 06/10/2020    CKD (chronic kidney disease) stage 3, GFR 30-59 ml/min (HCC) 06/10/2020    Parkinson disease 2020      LOS (days): 1  Geometric Mean LOS (GMLOS) (days): 2.7  Days to GMLOS:1.1     OBJECTIVE:  PATIENT READMITTED TO HOSPITAL  Risk of Unplanned Readmission Score: 17.22         Current admission status: Inpatient       Preferred Pharmacy:   [facility]/pharmacy #[id number] - [place], [place] - [address] [place] [id number]  Phone: [phone] Fax: [phone]    Primary Care Provider: Olivia Blackwood MD    Primary Insurance: MEDICARE  Secondary Insurance: [employer]    ASSESSMENT:  Active Health Care Proxies    There are no active Health Care Proxies on file.                      Patient Information  Admitted from:: Home  Mental Status: Alert  During Assessment patient was accompanied by: Spouse  Assessment information provided by:: Patient  Primary Caregiver: Spouse  Support Systems: Self, Spouse/significant other  County of Residence: [place]  What [facility] do you live in?: [place]  Living Arrangements: Lives w/ Spouse/significant other    Activities of Daily Living Prior to Admission  Does patient currently have HHC?: Yes ([facility])    Current Home Health Care  Type of Current Home Care Services: Home PT, Home OT  Current Home Health Agency:: [facility]  Current Home Health Follow-Up Provider:: PCP    Patient  Information Continued  Does patient receive dialysis treatments?: No  Does patient have a history of substance abuse?: No  Does patient have a history of Mental Health Diagnosis?: No         Means of Transportation  Means of Transport to Appts:: Family transport      Housing Stability: Low Risk  (2/8/2024)    Housing Stability Vital Sign     Unable to Pay for Housing in the Last Year: No     Number of Places Lived in the Last Year: 1     Unstable Housing in the Last Year: No   Food Insecurity: No Food Insecurity (2/8/2024)    Hunger Vital Sign     Worried About Running Out of Food in the Last Year: Never true     Ran Out of Food in the Last Year: Never true   Transportation Needs: No Transportation Needs (2/8/2024)    PRAPARE - Transportation     Lack of Transportation (Medical): No     Lack of Transportation (Non-Medical): No   Utilities: Not At Risk (2/8/2024)    [facility] Utilities     Threatened with loss of utilities: No       DISCHARGE DETAILS:    Discharge planning discussed with:: patient and spouse at bedside  Freedom of Choice: Yes (re: home care)  Comments - Freedom of Choice: requesting referral to [facility] for resumption of care  CM contacted family/caregiver?: Yes  Were Treatment Team discharge recommendations reviewed with patient/caregiver?: Yes  Did patient/caregiver verbalize understanding of patient care needs?: Yes  Were patient/caregiver advised of the risks associated with not following Treatment Team discharge recommendations?: Yes    Contacts  Patient Contacts: darya Cruz  Relationship to Patient:: Family  Contact Method: In Person  Reason/Outcome: Referral, Continuity of Care, Emergency Contact, Discharge Planning    Requested Home Health Care         Is the patient interested in HHC at discharge?: Yes  Home Health Discipline requested:: Occupational Therapy, Physical Therapy  Home Health Agency Name:: [facility]  Home Health Follow-Up Provider:: PCP  Home Health Services Needed::  Gait/ADL Training, Evaluate Functional Status and Safety, Strengthening/Theraputic Exercises to Improve Function  Homebound Criteria Met:: Requires the Assistance of Another Person for Safe Ambulation or to Leave the Home, Uses an Assist Device (i.e. cane, walker, etc)  Supporting Clincal Findings:: Limited Endurance, Fatigues Easliy in Short Distances    DME Referral Provided  Referral made for DME?: No    Other Referral/Resources/Interventions Provided:  Interventions: [facility]  Referral Comments: Patient admitted due to seizure-like activity. Per SLIM, patient anticipated to be ready for d/c today. PT recommending home care, but patient current with PT/OT through [facility] prior to admission. Met with patient and spouse at bedside to review d/c for today and resumption of home care services - both in agreement with same and requesting referral to [facility]. Referral sent in AIDIN and TT sent to MD to request order for home care when d/c being written. No other d/c needs identified at this time. Spouse to transport home once d/c written.    Would you like to participate in our [facility] service program?  : No - Declined    Treatment Team Recommendation: Home with Home Health Care  Discharge Destination Plan:: Home with Home Health Care ([facility])  Transport at Discharge : Family

## 2024-02-08 NOTE — DISCHARGE INSTR - AVS FIRST PAGE
Dear Kamilla Pat,     It was our pleasure to care for you here at [facility].  It is our hope that we were always able to exceed the expected standards for your care during your stay.  You were hospitalized due to seizure-like activities.  You were cared for on the [place] third floor by Ruperto Sexton MD under the service of Twila Santamaria MD with the [facility] who covers for your primary care physician (PCP), Olivia Blackwood MD, while you were hospitalized.  If you have any questions or concerns related to this hospitalization, you may contact us at .  For follow up as well as any medication refills, we recommend that you follow up with your primary care physician.  A registered nurse will reach out to you by phone within a few days after your discharge to answer any additional questions that you may have after going home.  However, at this time we provide for you here, the most important instructions / recommendations at discharge:     Notable Medication Adjustments -   Please start to take Keppra to 1000 mg twice daily.  Please continue to take all your home medications.  Testing Required after Discharge -   None  ** Please contact your PCP to request testing orders for any of the testing recommended here **  Important follow up information -   Please follow-up with your PCP within 1 week of discharge.  Please call your Neurologist office within 1 to 2 days of discharge.  Please also follow-up with them within 1 to 2 weeks of discharge.  Other Instructions -   Please return back to ED/hospital if you are experiencing recurrent seizure, worsening headache, dizziness, blurry vision, chest pain, shortness of breath.  Please review this entire after visit summary as additional general instructions including medication list, appointments, activity, diet, any pertinent wound care, and other additional recommendations from your care team that  may be provided for you.      Sincerely,     Ruperto Sexton MD

## 2024-02-08 NOTE — ASSESSMENT & PLAN NOTE
Currently taking Sinemet immediate release 3 tablets 4 times daily.  Patient was seen by movement disorder specialist in the month of December 2023 and increase the dose.  The first-time seizure that she had was in December/2023.  Patient does not recall if the seizure onset is prior to or after the dose adjustment.  She has been feeling restless, agitated, anxious, and having insomnia.  Suspecting increase in dopamine levels.     Plan:  Consider vitamin D and B6 levels.  Consider lowering the dose of Sinemet/changing from IR to ER and monitor for clinical improvement.

## 2024-02-08 NOTE — DISCHARGE SUMMARY
[facility]  Discharge- Kamilla Pat 1945, 78 y.o. female MRN: [id number]  Unit/Bed#: S MS Xie-Bhav[facility] Encounter: [id number]  Primary Care Provider: Olivia Blackwood MD   Date and time admitted to hospital: 2/6/2024  8:26 PM    Multiple subsegmental pulmonary emboli without acute cor pulmonale (HCC)  Assessment & Plan  Continue Eliquis 5 Mg twice daily    Pure hypercholesterolemia  Assessment & Plan  Continue ezetimibe    Parkinson disease  Assessment & Plan  Continue home dosage Sinemet    * Seizure-like activity (HCC)  Assessment & Plan  Patient reports having seizure-like activity for the past 3 nights  Seizure-like activity tonight lasted for around 15 minutes  Patient family reports patient was found to have generalized shaking and arm jerking and then full body shaking with loss of consciousness  No urinary or bowel incontinence reported, tongue bite present on the left side  Patient was recently admitted on January 12, due to PE and was found to have seizure in the hospital  Patient was started on Keppra 750 Mg twice daily  MRI brain without contrast on 1/14/2024 with no acute infarction, intracranial hemorrhage or mass effect.  1/14: EEG: Abnormal with Generalized irregular delta activity   Lactic acid: 11.8 - 1.6     Workups:  CT head: Stable findings without acute intracranial abnormality.   MRI: Pending  EEG: Deferred    PLAN:  Consult neurology. Appreciated recs.  Increased Keppra 1000 mg twice daily    Hyponatremia-resolved as of 2/7/2024  Assessment & Plan  Lab Results   Component Value Date    SODIUM 137 02/07/2024    SODIUM 134 (L) 02/06/2024    SODIUM 131 (L) 01/16/2024     Na: 134   Patient currently asymptomatic  Can be secondary to Keppra  Monitor BMP daily while inpatient        VTE Pharmacologic Prophylaxis: VTE Score: 7 High Risk (Score >/= 5) - Pharmacological DVT Prophylaxis Ordered: apixaban (Eliquis). Sequential Compression Devices Ordered.    Mobility:    Basic Mobility Inpatient Raw Score: 13  -HLM Goal: 4: Move to chair/commode  JH-HLM Achieved: 5: Stand (1 or more minutes)  HLM Goal achieved. Continue to encourage appropriate mobility.    Patient Centered Rounds: I performed bedside rounds with nursing staff today.  Discussions with Specialists or Other Care Team Provider: Neurology, attending    Education and Discussions with Family / Patient:  Will update family later.     Current Length of Stay: 1 day(s)  Current Patient Status: Inpatient   Discharge Plan: Anticipate discharge later today or tomorrow to discharge location to be determined pending rehab evaluations.    Code Status: Level 1 - Full Code    Subjective:   Patient was seen and examined bedside.  OOA x 4, NAD.  Did not have any more seizure-like activities.  Denied any headache, dizziness, CP, SOB, abdomen pain.    Objective:     Vitals:   Temp (24hrs), Av.9 °F (36.6 °C), Min:97.4 °F (36.3 °C), Max:98.2 °F (36.8 °C)    Temp:  [97.4 °F (36.3 °C)-98.2 °F (36.8 °C)] 97.9 °F (36.6 °C)  HR:  [81-85] 83  Resp:  [18] 18  BP: (118-146)/(71-83) 146/83  SpO2:  [94 %-98 %] 94 %  There is no height or weight on file to calculate BMI.     Input and Output Summary (last 24 hours):     Intake/Output Summary (Last 24 hours) at 2024 0843  Last data filed at 2024 2100  Gross per 24 hour   Intake 480 ml   Output --   Net 480 ml       Physical Exam:   Physical Exam  Vitals and nursing note reviewed.   Constitutional:       General: She is not in acute distress.     Appearance: She is well-developed. She is not ill-appearing.   HENT:      Head: Normocephalic and atraumatic.      Mouth/Throat:      Mouth: Mucous membranes are moist.   Eyes:      General:         Right eye: No discharge.         Left eye: No discharge.      Extraocular Movements: Extraocular movements intact.      Conjunctiva/sclera: Conjunctivae normal.   Cardiovascular:      Rate and Rhythm: Normal rate and regular rhythm.      Heart  sounds: No murmur heard.  Pulmonary:      Effort: Pulmonary effort is normal. No respiratory distress.      Breath sounds: Normal breath sounds. No wheezing, rhonchi or rales.   Chest:      Chest wall: No tenderness.   Abdominal:      General: Bowel sounds are normal.      Palpations: Abdomen is soft.      Tenderness: There is no abdominal tenderness. There is no guarding or rebound.   Musculoskeletal:         General: No swelling or tenderness.      Cervical back: Normal range of motion and neck supple. No rigidity or tenderness.   Skin:     General: Skin is warm and dry.      Capillary Refill: Capillary refill takes less than 2 seconds.   Neurological:      Mental Status: She is alert and oriented to person, place, and time.      Comments: Face symmetrical, tongue midline. Normal tongue movement. Normal speech.  No sensory deficits on face.  Normal vision, normal EOM, normal hearing.  Bilateral hand  strength 5/5.  Bilateral upper extremities strength 4-5/5. No light touch/proprioception sensory deficits.  Bilateral feet/ legs strength 4-5/5.  No light touch/proprioception sensory deficits.  Finger-to-nose normal.  No obvious resting tremor noticed.   Psychiatric:         Mood and Affect: Mood normal.         Thought Content: Thought content normal.          Additional Data:     Labs:  Results from last 7 days   Lab Units 02/08/24 0450 02/07/24 0441 02/06/24  2107   WBC Thousand/uL 5.12   < > 6.44   HEMOGLOBIN g/dL 10.9*   < > 12.5   HEMATOCRIT % 33.8*   < > 39.8   PLATELETS Thousands/uL 228   < > 257   NEUTROS PCT %  --   --  78*   LYMPHS PCT %  --   --  14   MONOS PCT %  --   --  6   EOS PCT %  --   --  0    < > = values in this interval not displayed.     Results from last 7 days   Lab Units 02/08/24  0450 02/07/24 0441 02/06/24  2110   SODIUM mmol/L 137   < > 134*   POTASSIUM mmol/L 3.2*   < > 3.1*   CHLORIDE mmol/L 105   < > 98   CO2 mmol/L 22   < > 9*   BUN mg/dL 6   < > 10   CREATININE mg/dL 0.62   <  > 0.88   ANION GAP mmol/L 10   < > 27   CALCIUM mg/dL 8.9   < > 9.5   ALBUMIN g/dL  --   --  4.4   TOTAL BILIRUBIN mg/dL  --   --  1.28*   ALK PHOS U/L  --   --  92   ALT U/L  --   --  <3*   AST U/L  --   --  7*   GLUCOSE RANDOM mg/dL 85   < > 140    < > = values in this interval not displayed.     Results from last 7 days   Lab Units 02/06/24  2107   INR  1.04     Results from last 7 days   Lab Units 02/06/24  2034   POC GLUCOSE mg/dl 132         Results from last 7 days   Lab Units 02/06/24  2340 02/06/24  2107   LACTIC ACID mmol/L 1.6 11.8*       Lines/Drains:  Invasive Devices       Peripheral Intravenous Line  Duration             Peripheral IV 02/06/24 Left Antecubital 2 days    Peripheral IV 02/06/24 Right Antecubital 1 day                          Imaging: Reviewed radiology reports from this admission including:    CT head without contrast   ED Interpretation by Deshawn Machuca PA-C (02/07 0022)   Reading Physician Reading Date Result Priority  Micky Bahena MD  [phone] 2/7/2024     Narrative & Impression  CT BRAIN - WITHOUT CONTRAST     INDICATION:   Seizure disorder, clinical change  hx of seizures; witnessed seizure this evening with loc.     COMPARISON: 1/14/2024.     TECHNIQUE:  CT examination of the brain was performed.  Multiplanar 2D reformatted images were created from the source data.     Radiation dose length product (DLP) for this visit:  946 mGy-cm .  This examination, like all CT scans performed in the [facility], was performed utilizing techniques to minimize radiation dose exposure, including the use of iterative   reconstruction and automated exposure control.     IMAGE QUALITY:  Diagnostic.     FINDINGS:     PARENCHYMA:  No intracranial mass, mass effect or midline shift. No CT signs of acute infarction.  No acute parenchymal hemorrhage.     VENTRICLES AND EXTRA-AXIAL SPACES: Stable moderate enlargement of the ventricles. Cerebral sulci    and cisterns appear normal in  size and configuration for chronological age..     VISUALIZED ORBITS: Normal visualized orbits.     PARANASAL SINUSES: Normal visualized paranasal sinuses.     CALVARIUM AND EXTRACRANIAL SOFT TISSUES: Unremarkable.     IMPRESSION:     Stable findings without acute intracranial abnormality.                 Workstation performed: [id number]        Final Result by Micky Bahena MD (02/07 0000)      Stable findings without acute intracranial abnormality.                  Workstation performed: [id number]         MRI brain w wo contrast    (Results Pending)       Recent Cultures (last 7 days):         Last 24 Hours Medication List:   Current Facility-Administered Medications   Medication Dose Route Frequency Provider Last Rate    apixaban  5 mg Oral BID Ruperto Sexton MD      calcium carbonate  1 tablet Oral Daily Sharon Glez MD      carbidopa-levodopa  3 tablet Oral 4x Daily Ruperto Sexton MD      cholecalciferol  1,000 Units Oral BID Sharon Glez MD      ezetimibe  10 mg Oral HS Sharon Glez MD      levETIRAcetam  1,000 mg Oral Q12H CESAR Richards DO      potassium chloride  40 mEq Oral Once Ruperto Sexotn MD      potassium chloride  10 mEq Oral Daily Sharon Glez MD          Today, Patient Was Seen By: Ruperto Sexton MD    **Please Note: This note may have been constructed using a voice recognition system.**

## 2024-02-08 NOTE — QUICK NOTE
"- MRI brain with and without contrast demonstrated stable MRI of the brain with no acute intracranial abnormality.  Minimal periventricular white matter change consistent with chronic microangiopathy.    - Routine EEG recorded during wakefulness, drowsiness, and sleep is abnormal.  Background activities and posterior dominant rhythm are mildly to slow, suggesting mild diffuse cerebral dysfunction of nonspecific etiology.    - UDS negative  - UA with reflex to microscope: 1+ protein, 2+ ketones, negative nitrite, negative glucose, negative leukocytes  - Salicylate levels:<5  - Acetaminophen level:<2  - Ethanol level:<10  - TSH 4.419  - Total CK 26  - Levetiracetam level in process  - Hemoglobin A1c pending  - Vitamin B12 pending  - Lipid panel, 11/3/2023: Cholesterol 220, triglycerides 278, HDL 31,     ///    Assessment:  Patient is a 78-year-old right-handed female with past medical history of Parkinson disease, seizure-like activity, hypercholesterolemia, CKD stage III, history of multiple subsegmental pulmonary emoli that presented to [facility] on February 6, 2024 for seizure-like activity.  Patient reported she was sitting in bed, was eating Doritos chips, felt that her jaw was \"locking up\" told her partner, , that \"I can feel it coming on\".  The patient reported then feeling that her whole body \"started to shake a lot\", reported that her first episode was February 5 2024, lasting around 5 minutes, and second episode February 6, 2024, having lasted around 15 minutes.  Patient reported that she was aware through the entire episode, was able to look around, however was unable to move or speak.  Patient reported that she felt very tired afterwards, reported no loss of consciousness, no bowel bladder incontinence, no tongue biting.  Patient reported that this event occurred soon after having taken her carbidopa levodopa, and reported that incidentally she noticed that this also happened the " day prior.  The patient reported that her event on February 6, 2024 was the same as prior to presentation in January 2024, reports that she has not missed any of her levetiracetam dosing, and reports that her 's license was suspended.    Impression:  Unclear reasoning as to why patient has been experiencing increasing seizure episodes.  Further evaluation with MRI demonstrated no acute intracranial pathology, and EEG demonstrated findings consistent with mild cerebral diffuse dysfunction of nonspecific etiology.    Plan:  - Case discussed with attending neurologist Dr. Calvo  - Maintain normotension, normothermia, euglycemia  - Recommend levetiracetam 1000 mg every 12 hours  - Recommend continuing carbidopa levodopa home dosing  - [employer] completed and submitted on 2/7/2024  --Education regarding driving cessation, seizure safety, precautions and of medication adherence provided, patient understanding.  - Continue to evaluate and treat any infectious, inflammatory, metabolic derangements  - Continue PT/OT  - Continue seizure precautions  - DVT prophylaxis per primary team  - Recommend 1 mg of intravenous lorazepam for generalized tonic-clonic seizure only lasting more than 3 minutes, please contact neurology  - Recommend noncontrast CT head, STAT, to be completed for any acute change in mental status/neurologic examination, please contact neurology  - No further inpatient neurologic recommendations at this time, please contact for any further questions or concerns.  - Patient to follow-up with neurology in outpatient setting with sammyy advanced practitioner in 4 to 6 weeks.   - Patient to follow-up with neurology in outpatient setting with movement disorder advanced practitioner in 4 to 6 weeks.  - No further imaging required at this time.  - Rest per primary team appreciated

## 2024-02-08 NOTE — ASSESSMENT & PLAN NOTE
Patient reports having seizure-like activity for the past 3 nights  Seizure-like activity tonight lasted for around 15 minutes  Patient family reports patient was found to have generalized shaking and arm jerking and then full body shaking with loss of consciousness  No urinary or bowel incontinence reported, tongue bite present on the left side  Patient was recently admitted on January 12, due to PE and was found to have seizure in the hospital  Patient was started on Keppra 750 Mg twice daily  MRI brain without contrast on 1/14/2024 with no acute infarction, intracranial hemorrhage or mass effect.  1/14: EEG: Abnormal with Generalized irregular delta activity   Lactic acid: 11.8 - 1.6   B12 low  Patient was readmitted on the same-day of discharge. Recurrent similar seizure episode on the way home. Neuro- oncall recommended xferring to [facility] for vEEG.    Workups:  CT head: Stable findings without acute intracranial abnormality.   MRI w/ con: Stable MRI of the brain with no acute intracranial abnormality. 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  EEG: This Routine EEG recorded during wakefulness, drowsiness, and sleep is abnormal. Background activities and posteriorly dominant rhythm are mildly too slow, suggesting mild diffuse cerebral dysfunction of nonspecific etiology.     PLAN:  Neurology consult.  Continue Keppra 1000 mg twice daily.  Video EEG- pending transfer to [facility].  Ativan 1 mg IV as needed for persistent seizure activity over 3 minutes.  Seizure precaution reviewed with patient  [facility].

## 2024-02-08 NOTE — ASSESSMENT & PLAN NOTE
Patient reports having seizure-like activity for the past 3 nights  Seizure-like activity tonight lasted for around 15 minutes  Patient family reports patient was found to have generalized shaking and arm jerking and then full body shaking with loss of consciousness  No urinary or bowel incontinence reported, tongue bite present on the left side  Patient was recently admitted on January 12, due to PE and was found to have seizure in the hospital  Patient was started on Keppra 750 Mg twice daily  MRI brain without contrast on 1/14/2024 with no acute infarction, intracranial hemorrhage or mass effect.  1/14: EEG: Abnormal with Generalized irregular delta activity   Lactic acid: 11.8 - 1.6     Workups:  CT head: Stable findings without acute intracranial abnormality.   MRI w/ con: Stable MRI of the brain with no acute intracranial abnormality. 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  EEG: This Routine EEG recorded during wakefulness, drowsiness, and sleep is abnormal. Background activities and posteriorly dominant rhythm are mildly too slow, suggesting mild diffuse cerebral dysfunction of nonspecific etiology.     PLAN:  Patient was discharged today afternoon.  She noticed seizure-like movements in 4 limbs, did not lose consciousness, did not lose control of bowel and bladder.  Denies pre and post aura.  Patient recalls whole event.  In the ED, prior to this admission contacted neurology on-call, they wanted to transfer to [place].  But due to bed now on availability FEW HRS, we are admitting her at [facility] with the plan to transfer to [place]  At the time of discharge she was advised to continue taking Keppra 1000 mg twice daily. last dose is this morning.   She received loading dose of 2000 mg of Keppra in the ED  Video EEG  Keppra levels pending  Ativan as needed  Consider lactic acid, NH3 levels AM  Seizure precaution reviewed with patient

## 2024-02-08 NOTE — PLAN OF CARE
Problem: Prexisting or High Potential for Compromised Skin Integrity  Goal: Skin integrity is maintained or improved  Description: INTERVENTIONS:  - Identify patients at risk for skin breakdown  - Assess and monitor skin integrity  - Assess and monitor nutrition and hydration status  - Monitor labs   - Assess for incontinence   - Turn and reposition patient  - Assist with mobility/ambulation  - Relieve pressure over bony prominences  - Avoid friction and shearing  - Provide appropriate hygiene as needed including keeping skin clean and dry  - Evaluate need for skin moisturizer/barrier cream  - Collaborate with interdisciplinary team   - Patient/family teaching  - Consider wound care consult   Outcome: Progressing     Problem: PAIN - ADULT  Goal: Verbalizes/displays adequate comfort level or baseline comfort level  Description: Interventions:  - Encourage patient to monitor pain and request assistance  - Assess pain using appropriate pain scale  - Administer analgesics based on type and severity of pain and evaluate response  - Implement non-pharmacological measures as appropriate and evaluate response  - Consider cultural and social influences on pain and pain management  - Notify physician/advanced practitioner if interventions unsuccessful or patient reports new pain  Outcome: Progressing     Problem: INFECTION - ADULT  Goal: Absence or prevention of progression during hospitalization  Description: INTERVENTIONS:  - Assess and monitor for signs and symptoms of infection  - Monitor lab/diagnostic results  - Monitor all insertion sites, i.e. indwelling lines, tubes, and drains  - Monitor endotracheal if appropriate and nasal secretions for changes in amount and color  - Nucla appropriate cooling/warming therapies per order  - Administer medications as ordered  - Instruct and encourage patient and family to use good hand hygiene technique  - Identify and instruct in appropriate isolation precautions for  identified infection/condition  Outcome: Progressing  Goal: Absence of fever/infection during neutropenic period  Description: INTERVENTIONS:  - Monitor WBC    Outcome: Progressing     Problem: SAFETY ADULT  Goal: Patient will remain free of falls  Description: INTERVENTIONS:  - Educate patient/family on patient safety including physical limitations  - Instruct patient to call for assistance with activity   - Consult OT/PT to assist with strengthening/mobility   - Keep Call bell within reach  - Keep bed low and locked with side rails adjusted as appropriate  - Keep care items and personal belongings within reach  - Initiate and maintain comfort rounds  - Make Fall Risk Sign visible to staff  - Offer Toileting every 2 Hours, in advance of need  - Initiate/Maintain bed/chair alarm  - Obtain necessary fall risk management equipment  - Apply yellow socks and bracelet for high fall risk patients  - Consider moving patient to room near nurses station  Outcome: Progressing  Goal: Maintain or return to baseline ADL function  Description: INTERVENTIONS:  -  Assess patient's ability to carry out ADLs; assess patient's baseline for ADL function and identify physical deficits which impact ability to perform ADLs (bathing, care of mouth/teeth, toileting, grooming, dressing, etc.)  - Assess/evaluate cause of self-care deficits   - Assess range of motion  - Assess patient's mobility; develop plan if impaired  - Assess patient's need for assistive devices and provide as appropriate  - Encourage maximum independence but intervene and supervise when necessary  - Involve family in performance of ADLs  - Assess for home care needs following discharge   - Consider OT consult to assist with ADL evaluation and planning for discharge  - Provide patient education as appropriate  Outcome: Progressing  Goal: Maintains/Returns to pre admission functional level  Description: INTERVENTIONS:  - Perform AM-PAC 6 Click Basic Mobility/ Daily  Activity assessment daily.  - Set and communicate daily mobility goal to care team and patient/family/caregiver.   - Collaborate with rehabilitation services on mobility goals if consulted  - Perform Range of Motion   - Reposition patient   - Dangle patient   - Stand patient   - Ambulate patient   - Out of bed to chair    - Out of bed for meals   - Out of bed for toileting  - Record patient progress and toleration of activity level   Outcome: Progressing     Problem: DISCHARGE PLANNING  Goal: Discharge to home or other facility with appropriate resources  Description: INTERVENTIONS:  - Identify barriers to discharge w/patient and caregiver  - Arrange for needed discharge resources and transportation as appropriate  - Identify discharge learning needs (meds, wound care, etc.)  - Arrange for interpretive services to assist at discharge as needed  - Refer to Case Management Department for coordinating discharge planning if the patient needs post-hospital services based on physician/advanced practitioner order or complex needs related to functional status, cognitive ability, or social support system  Outcome: Progressing     Problem: Knowledge Deficit  Goal: Patient/family/caregiver demonstrates understanding of disease process, treatment plan, medications, and discharge instructions  Description: Complete learning assessment and assess knowledge base.  Interventions:  - Provide teaching at level of understanding  - Provide teaching via preferred learning methods  Outcome: Progressing

## 2024-02-08 NOTE — PROGRESS NOTES
Critical access hospital  Progress Note  Name: Kamilla Pat I  MRN: [id number]  Unit/Bed#: S -01 I Date of Admission: 2/6/2024   Date of Service: 2/8/2024 I Hospital Day: 1    Assessment/Plan   Multiple subsegmental pulmonary emboli without acute cor pulmonale (HCC)  Assessment & Plan  Continue Eliquis 5 Mg twice daily    Pure hypercholesterolemia  Assessment & Plan  Continue ezetimibe    Parkinson disease  Assessment & Plan  Continue home dosage Sinemet    * Seizure-like activity (HCC)  Assessment & Plan  Patient reports having seizure-like activity for the past 3 nights  Seizure-like activity tonight lasted for around 15 minutes  Patient family reports patient was found to have generalized shaking and arm jerking and then full body shaking with loss of consciousness  No urinary or bowel incontinence reported, tongue bite present on the left side  Patient was recently admitted on January 12, due to PE and was found to have seizure in the hospital  Patient was started on Keppra 750 Mg twice daily  MRI brain without contrast on 1/14/2024 with no acute infarction, intracranial hemorrhage or mass effect.  1/14: EEG: Abnormal with Generalized irregular delta activity   Lactic acid: 11.8 - 1.6     Workups:  CT head: Stable findings without acute intracranial abnormality.   MRI: Pending  EEG: Deferred this time    PLAN:  Consult neurology. Appreciated recs.  Increased Keppra 1000 mg twice daily    Hyponatremia-resolved as of 2/7/2024  Assessment & Plan  Lab Results   Component Value Date    SODIUM 137 02/07/2024    SODIUM 134 (L) 02/06/2024    SODIUM 131 (L) 01/16/2024     Na: 134   Patient currently asymptomatic  Can be secondary to Keppra  Monitor BMP daily while inpatient             VTE Pharmacologic Prophylaxis: VTE Score: 7 High Risk (Score >/= 5) - Pharmacological DVT Prophylaxis Ordered: apixaban (Eliquis). Sequential Compression Devices Ordered.    Mobility:   Basic Mobility Inpatient Raw  Score: 13  -HLM Goal: 4: Move to chair/commode  JH-HLM Achieved: 5: Stand (1 or more minutes)  HLM Goal achieved. Continue to encourage appropriate mobility.    Patient Centered Rounds: I performed bedside rounds with nursing staff today.  Discussions with Specialists or Other Care Team Provider: Neurology, attending    Education and Discussions with Family / Patient: Will update family later.     Current Length of Stay: 1 day(s)  Current Patient Status: Inpatient   Discharge Plan: Anticipate discharge later today or tomorrow to discharge location to be determined pending rehab evaluations.    Code Status: Level 1 - Full Code    Subjective:   Patient was seen and examined bedside.  OOA x 4, NAD.  Did not have any more seizure-like activities.  Denied any headache, dizziness, CP, SOB, abdomen pain.    Objective:     Vitals:   Temp (24hrs), Av.9 °F (36.6 °C), Min:97.4 °F (36.3 °C), Max:98.2 °F (36.8 °C)    Temp:  [97.4 °F (36.3 °C)-98.2 °F (36.8 °C)] 97.9 °F (36.6 °C)  HR:  [81-85] 83  Resp:  [18] 18  BP: (118-146)/(71-83) 146/83  SpO2:  [94 %-98 %] 94 %  There is no height or weight on file to calculate BMI.     Input and Output Summary (last 24 hours):     Intake/Output Summary (Last 24 hours) at 2024 0843  Last data filed at 2024 2100  Gross per 24 hour   Intake 480 ml   Output --   Net 480 ml       Physical Exam:   Physical Exam  Vitals and nursing note reviewed.   Constitutional:       General: She is not in acute distress.     Appearance: She is well-developed. She is not ill-appearing.   HENT:      Head: Normocephalic and atraumatic.      Mouth/Throat:      Mouth: Mucous membranes are moist.   Eyes:      General:         Right eye: No discharge.         Left eye: No discharge.      Extraocular Movements: Extraocular movements intact.      Conjunctiva/sclera: Conjunctivae normal.   Cardiovascular:      Rate and Rhythm: Normal rate and regular rhythm.      Heart sounds: No murmur heard.  Pulmonary:       Effort: Pulmonary effort is normal. No respiratory distress.      Breath sounds: Normal breath sounds. No wheezing, rhonchi or rales.   Chest:      Chest wall: No tenderness.   Abdominal:      General: Bowel sounds are normal.      Palpations: Abdomen is soft.      Tenderness: There is no abdominal tenderness. There is no guarding or rebound.   Musculoskeletal:         General: No swelling or tenderness.      Cervical back: Normal range of motion and neck supple. No rigidity or tenderness.   Skin:     General: Skin is warm and dry.      Capillary Refill: Capillary refill takes less than 2 seconds.   Neurological:      Mental Status: She is alert and oriented to person, place, and time.      Comments: Face symmetrical, tongue midline. Normal tongue movement. Normal speech.  No sensory deficits on face.  Normal vision, normal EOM, normal hearing.  Bilateral hand  strength 5/5.  Bilateral upper extremities strength 4-5/5. No light touch/proprioception sensory deficits.  Bilateral feet/ legs strength 4-5/5.  No light touch/proprioception sensory deficits.  Finger-to-nose normal.  No obvious resting tremor noticed.   Psychiatric:         Mood and Affect: Mood normal.         Thought Content: Thought content normal.          Additional Data:     Labs:  Results from last 7 days   Lab Units 02/08/24  0450 02/07/24  0441 02/06/24  2107   WBC Thousand/uL 5.12   < > 6.44   HEMOGLOBIN g/dL 10.9*   < > 12.5   HEMATOCRIT % 33.8*   < > 39.8   PLATELETS Thousands/uL 228   < > 257   NEUTROS PCT %  --   --  78*   LYMPHS PCT %  --   --  14   MONOS PCT %  --   --  6   EOS PCT %  --   --  0    < > = values in this interval not displayed.     Results from last 7 days   Lab Units 02/08/24  0450 02/07/24  0441 02/06/24  2110   SODIUM mmol/L 137   < > 134*   POTASSIUM mmol/L 3.2*   < > 3.1*   CHLORIDE mmol/L 105   < > 98   CO2 mmol/L 22   < > 9*   BUN mg/dL 6   < > 10   CREATININE mg/dL 0.62   < > 0.88   ANION GAP mmol/L 10   < >  27   CALCIUM mg/dL 8.9   < > 9.5   ALBUMIN g/dL  --   --  4.4   TOTAL BILIRUBIN mg/dL  --   --  1.28*   ALK PHOS U/L  --   --  92   ALT U/L  --   --  <3*   AST U/L  --   --  7*   GLUCOSE RANDOM mg/dL 85   < > 140    < > = values in this interval not displayed.     Results from last 7 days   Lab Units 02/06/24  2107   INR  1.04     Results from last 7 days   Lab Units 02/06/24  2034   POC GLUCOSE mg/dl 132         Results from last 7 days   Lab Units 02/06/24  2340 02/06/24  2107   LACTIC ACID mmol/L 1.6 11.8*       Lines/Drains:  Invasive Devices       Peripheral Intravenous Line  Duration             Peripheral IV 02/06/24 Left Antecubital 2 days    Peripheral IV 02/06/24 Right Antecubital 1 day                          Imaging: Reviewed radiology reports from this admission including:   CT head without contrast   ED Interpretation by Deshawn Machuca PA-C (02/07 0022)   Reading Physician Reading Date Result Priority  Micky Bahena MD  [phone] 2/7/2024     Narrative & Impression  CT BRAIN - WITHOUT CONTRAST     INDICATION:   Seizure disorder, clinical change  hx of seizures; witnessed seizure this evening with loc.     COMPARISON: 1/14/2024.     TECHNIQUE:  CT examination of the brain was performed.  Multiplanar 2D reformatted images were created from the source data.     Radiation dose length product (DLP) for this visit:  946 mGy-cm .  This examination, like all CT scans performed in the [facility], was performed utilizing techniques to minimize radiation dose exposure, including the use of iterative   reconstruction and automated exposure control.     IMAGE QUALITY:  Diagnostic.     FINDINGS:     PARENCHYMA:  No intracranial mass, mass effect or midline shift. No CT signs of acute infarction.  No acute parenchymal hemorrhage.     VENTRICLES AND EXTRA-AXIAL SPACES: Stable moderate enlargement of the ventricles. Cerebral sulci    and cisterns appear normal in size and configuration for  chronological age..     VISUALIZED ORBITS: Normal visualized orbits.     PARANASAL SINUSES: Normal visualized paranasal sinuses.     CALVARIUM AND EXTRACRANIAL SOFT TISSUES: Unremarkable.     IMPRESSION:     Stable findings without acute intracranial abnormality.                 Workstation performed: [id number]        Final Result by Micky Bahena MD (02/07 0000)      Stable findings without acute intracranial abnormality.                  Workstation performed: [id number]         MRI brain w wo contrast    (Results Pending)       Recent Cultures (last 7 days):         Last 24 Hours Medication List:   Current Facility-Administered Medications   Medication Dose Route Frequency Provider Last Rate    apixaban  5 mg Oral BID Ruperto Sexton MD      calcium carbonate  1 tablet Oral Daily Sharon Glez MD      carbidopa-levodopa  3 tablet Oral 4x Daily Ruperto Sexton MD      cholecalciferol  1,000 Units Oral BID Sharon Glez MD      ezetimibe  10 mg Oral HS Sharon Glez MD      levETIRAcetam  1,000 mg Oral Q12H CESAR Richards,       potassium chloride  40 mEq Oral Once Ruperto Sexton MD      potassium chloride  10 mEq Oral Daily Sharon Glez MD          Today, Patient Was Seen By: Ruperto Sexton MD    **Please Note: This note may have been constructed using a voice recognition system.**

## 2024-02-08 NOTE — ASSESSMENT & PLAN NOTE
Diagnosed with PE on 1/11/2024.  Currently on Eliquis 5 mg twice a day.  As per her previous discharge summary she has to be on Eliquis for total 6 months.    Plan:  Continue Eliquis 5 mg twice daily

## 2024-02-08 NOTE — H&P
Formerly [facility]  H&P  Name: Kamilla Pat 78 y.o. female I MRN: [id number]  Unit/Bed#: S -01 I Date of Admission: 2/8/2024   Date of Service: 2/8/2024 I Hospital Day: 0      Assessment/Plan   * Seizure-like activity (HCC)  Assessment & Plan  Patient reports having seizure-like activity for the past 3 nights  Seizure-like activity tonight lasted for around 15 minutes  Patient family reports patient was found to have generalized shaking and arm jerking and then full body shaking with loss of consciousness  No urinary or bowel incontinence reported, tongue bite present on the left side  Patient was recently admitted on January 12, due to PE and was found to have seizure in the hospital  Patient was started on Keppra 750 Mg twice daily  MRI brain without contrast on 1/14/2024 with no acute infarction, intracranial hemorrhage or mass effect.  1/14: EEG: Abnormal with Generalized irregular delta activity   Lactic acid: 11.8 - 1.6     Workups:  CT head: Stable findings without acute intracranial abnormality.   MRI w/ con: Stable MRI of the brain with no acute intracranial abnormality. 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  EEG: This Routine EEG recorded during wakefulness, drowsiness, and sleep is abnormal. Background activities and posteriorly dominant rhythm are mildly too slow, suggesting mild diffuse cerebral dysfunction of nonspecific etiology.     PLAN:  Patient was discharged today afternoon.  She noticed seizure-like movements in 4 limbs, did not lose consciousness, did not lose control of bowel and bladder.  Denies pre and post aura.  Patient recalls whole event.  In the ED, prior to this admission contacted neurology on-call, they wanted to transfer to [place].  But due to bed now on availability FEW HRS, we are admitting her at [place] with the plan to transfer to [place]  At the time of discharge she was advised to continue  taking Keppra 1000 mg twice daily. last dose is this morning.   She received loading dose of 2000 mg of Keppra in the ED  Video EEG  Keppra levels pending  Ativan as needed  Consider lactic acid, NH3 levels AM  Seizure precaution reviewed with patient    Parkinson disease  Assessment & Plan  Currently taking Sinemet immediate release 3 tablets 4 times daily.  Patient was seen by movement disorder specialist in the month of December 2023 and increase the dose.  The first-time seizure that she had was in December/2023.  Patient does not recall if the seizure onset is prior to or after the dose adjustment.  She has been feeling restless, agitated, anxious, and having insomnia.  Suspecting increase in dopamine levels.     Plan:  Consider vitamin D and B6 levels.  Consider lowering the dose of Sinemet/changing from IR to ER and monitor for clinical improvement.         Anxiety  Assessment & Plan  Patient has history of anxiety.  Thinks that anxiety is provoking seizures.  Currently not on any medications.  Pulse: 97    Plan:  Consider Atarax      History of pulmonary embolism  Assessment & Plan  Diagnosed with PE on 1/11/2024.  Currently on Eliquis 5 mg twice a day.  As per her previous discharge summary she has to be on Eliquis for total 6 months.    Plan:  Continue Eliquis 5 mg twice daily    Elevated WBC count  Assessment & Plan  on the day of discharge patient has elevated WBC 10.98.  Recent Labs     02/07/24  0441 02/08/24  0450 02/08/24  1535   WBC 7.26 5.12 10.98*     It is afebrile, no clear source of infection at this moment.    Plan:  CBC in the a.m.      CKD (chronic kidney disease) stage 2, GFR 60-89 ml/min  Assessment & Plan  Lab Results   Component Value Date    EGFR 64 02/08/2024    EGFR 87 02/08/2024    EGFR 91 02/07/2024    CREATININE 0.87 02/08/2024    CREATININE 0.62 02/08/2024    CREATININE 0.55 (L) 02/07/2024     Plan:  Avoid nephrotoxins  Avoid hypotension.         VTE Pharmacologic Prophylaxis: VTE  Score: 4  Eliquis  Code Status: Prior level 1  Discussion with family: Attempted to update  () via phone. Unable to contact.    Anticipated Length of Stay: Patient will be admitted on an inpatient basis with an anticipated length of stay of greater than 2 midnights secondary to seizure-like activity.    Chief Complaint: Patient was admitted in the [facility] for seizure-like activity evaluation from 2/6/2024, was discharged today 2/8/24 in the noon, on her way to home she experienced seizure-like activity.     History of Present Illness:  Kamilla Pat is a 78 y.o. female with a PMH of  Parkinson's disease, osteopenia, pulmonary embolism and seizures who originally presented to the hospital on 2/6/2024 due to  seizure-like activity with elevated lactic acid of 11.8.  Her CT and MRI brain revealed no intracranial lesions/mass effect.  Her EEG revealed mild diffuse cerebral dysfunction of nonspecific etiology.   Patient has been seen her neurologist-movement disorder specialist, last seen was in the month of December 2023.  Her Sinemet dose was increased and currently managed on Sinemet immediate release 3 tablets 4 times daily.  The first noticed seizure is in the month of December.   In the month of January she was admitted in the ED 1/11/24 diagnosed with PE and also found to have seizure-like activity.  She was started on Keppra 750 mg twice daily.   Most recent admission in the hospital from 2/6/2024 to 2/8/2024 for seizure-like activity, her Keppra dose was adjusted to 1000 mg twice daily.  Last Keppra dose intake was today in the a.m. prior to discharge.  Patient was discharged once she is stabilized, her weight to home she had similar seizure-like activity lasting for 5 minutes.  Denies pre and post aura.  Patient was able to recall the whole event.  Did not lose consciousness, denies fecal or urine incontinence.  Patient return to ED, received loading dose of 2000 mg Keppra, notified  neurology on-call.  Plan was made to transfer to [place] but due to lack of bed availability was admitted to the [facility] with a tentative plan to transfer to [place].     Review of Systems:  Review of Systems   Constitutional: Negative.    HENT: Negative.     Eyes: Negative.    Respiratory: Negative.     Cardiovascular: Negative.    Gastrointestinal: Negative.    Endocrine: Negative.    Musculoskeletal: Negative.    Neurological:  Positive for seizures. Negative for facial asymmetry, light-headedness, numbness and headaches.   Psychiatric/Behavioral:  Positive for sleep disturbance. Negative for agitation. The patient is nervous/anxious.        Past Medical and Surgical History:   Past Medical History:   Diagnosis Date    Anxiety     Hepatitis C 04/01/2017    screening Negative    High cholesterol     Hip pain     History of low potassium     Hyponatremia     Lower extremity edema     Obesity     Osteopenia     Overweight     Thigh pain        Past Surgical History:   Procedure Laterality Date    BLADDER SURGERY      CHOLECYSTECTOMY      COLOGUARD (HISTORICAL)  07/02/2018    COLONOSCOPY  01/01/2007    DXA PROCEDURE (HISTORICAL)  03/27/2014    MAMMO (HISTORICAL)  08/31/2016    REDUCTION MAMMAPLASTY      REPLACEMENT TOTAL KNEE Bilateral     TOTAL HIP ARTHROPLASTY Left 01/01/2015    TOTAL KNEE ARTHROPLASTY Left        Meds/Allergies:  Prior to Admission medications    Medication Sig Start Date End Date Taking? Authorizing Provider   acetaminophen (TYLENOL) 325 mg tablet Take 325 mg by mouth every 6 (six) hours as needed for mild pain    Historical Provider, MD   apixaban (Eliquis) 5 mg Take 2 tablets (10 mg total) by mouth 2 (two) times a day for 7 days, THEN 1 tablet (5 mg total) 2 (two) times a day for 21 days. 1/12/24 2/9/24  Jonathan Jenkins MD   Calcium Carbonate-Vitamin D (CALCIUM-CARB 600 + D PO) Take 1 tablet by mouth daily    Historical Provider, MD   carbidopa-levodopa (SINEMET)  mg per tablet Take 3  tablets by mouth 4 (four) times a day Please continue to take Sinemet as per your neurologist's instruction. 2/8/24 3/9/24  Ruperto Sexton MD   Cholecalciferol 50 MCG (2000 UT) CAPS Take 1 capsule by mouth daily    Historical Provider, MD   Diclofenac Sodium (VOLTAREN) 1 % Apply 2 g topically 4 (four) times a day 8/15/23   [facility] Cindi Christianson MD   ezetimibe (ZETIA) 10 mg tablet Take by mouth Daily 4/1/19   Historical Provider, MD   levETIRAcetam (KEPPRA) 1000 MG tablet Take 1 tablet (1,000 mg total) by mouth every 12 (twelve) hours 2/8/24 3/9/24  Ruperto Sexton MD   potassium chloride (MICRO-K) 10 MEQ CR capsule Take 20 mEq by mouth Daily Per [facility] note from Nahomi Sandoval, DO - 12/19/2023 10:10 AM EST 20 meq.  4/1/19   Historical Provider, MD   triamterene-hydrochlorothiazide (DYAZIDE) 37.5-25 mg per capsule Take 1 capsule by mouth Daily 4/1/19   Historical Provider, MD   carbidopa-levodopa (SINEMET)  mg per tablet Take 2 tablets by mouth 4 (four) times a day for 7 days 1/16/24 2/8/24  Faina Urrutia MD   levETIRAcetam (KEPPRA) 750 mg tablet Take 1 tablet (750 mg total) by mouth every 12 (twelve) hours 1/16/24 2/8/24  Faina Urrutia MD     I have reviewed home medications with patient personally.    Allergies:   Allergies   Allergen Reactions    Hydrocodone-Acetaminophen GI Intolerance    Hydromorphone GI Intolerance    Oxycodone-Acetaminophen GI Intolerance    Statins Myalgia       Social History:  Marital Status: /Civil Union   Occupation:   Patient Pre-hospital Living Situation: Home  Patient Pre-hospital Level of Mobility: walks  Patient Pre-hospital Diet Restrictions:   Substance Use History:   Social History     Substance and Sexual Activity   Alcohol Use Yes    Comment: social, Occasional      Social History     Tobacco Use   Smoking Status Never   Smokeless Tobacco Never     Social History     Substance and Sexual Activity   Drug Use Never       Family History:  Family History   Problem Relation Age of Onset     Glaucoma Mother     Hypertension Mother     Glaucoma Father     Leukemia Father     Hypertension Father        Physical Exam:    Vitals:   Blood Pressure: 131/62 (02/08/24 1751)  Pulse: 97 (02/08/24 1751)  Temperature: 98.5 °F (36.9 °C) (02/08/24 1520)  Temp Source: Oral (02/08/24 1520)  Respirations: 18 (02/08/24 1751)  SpO2: 96 % (02/08/24 1751)    Physical Exam  Vitals reviewed.   Constitutional:       Appearance: Normal appearance. She is ill-appearing.   HENT:      Head: Normocephalic and atraumatic.      Right Ear: External ear normal.      Left Ear: External ear normal.      Nose: Nose normal.      Mouth/Throat:      Pharynx: Oropharynx is clear.      Comments: Tongue bite present on the left side  Eyes:      Extraocular Movements: Extraocular movements intact.      Conjunctiva/sclera: Conjunctivae normal.      Pupils: Pupils are equal, round, and reactive to light.   Cardiovascular:      Rate and Rhythm: Regular rhythm. Tachycardia present.      Pulses: Normal pulses.      Heart sounds: Normal heart sounds.   Pulmonary:      Effort: Pulmonary effort is normal.      Breath sounds: Normal breath sounds.   Abdominal:      General: Abdomen is flat. Bowel sounds are normal.      Palpations: Abdomen is soft.   Musculoskeletal:         General: Normal range of motion.      Cervical back: Normal range of motion.      Right lower leg: No edema.      Left lower leg: No edema.   Skin:     General: Skin is warm.      Capillary Refill: Capillary refill takes 2 to 3 seconds.   Neurological:      General: No focal deficit present.      Mental Status: She is oriented to person, place, and time. Mental status is at baseline.      Cranial Nerves: No cranial nerve deficit.      Sensory: No sensory deficit.      Motor: No weakness.      Coordination: Coordination normal.      Gait: Gait normal.          Additional Data:     Lab Results:  Results from last 7 days   Lab Units 02/08/24  1535   WBC Thousand/uL 10.98*    HEMOGLOBIN g/dL 12.3   HEMATOCRIT % 39.8   PLATELETS Thousands/uL 277   NEUTROS PCT % 79*   LYMPHS PCT % 10*   MONOS PCT % 8   EOS PCT % 1     Results from last 7 days   Lab Units 02/08/24  1535   SODIUM mmol/L 136   POTASSIUM mmol/L 3.4*   CHLORIDE mmol/L 101   CO2 mmol/L 10*   BUN mg/dL 8   CREATININE mg/dL 0.87   ANION GAP mmol/L 25   CALCIUM mg/dL 9.6   ALBUMIN g/dL 4.4   TOTAL BILIRUBIN mg/dL 1.18*   ALK PHOS U/L 87   ALT U/L <3*   AST U/L 6*   GLUCOSE RANDOM mg/dL 135     Results from last 7 days   Lab Units 02/08/24  1535   INR  1.26*     Results from last 7 days   Lab Units 02/06/24  2034   POC GLUCOSE mg/dl 132     Results from last 7 days   Lab Units 02/08/24  0450   HEMOGLOBIN A1C % 5.2     Results from last 7 days   Lab Units 02/06/24  2340 02/06/24  2107   LACTIC ACID mmol/L 1.6 11.8*       Lines/Drains:  Invasive Devices       Peripheral Intravenous Line  Duration             Peripheral IV 02/08/24 Left;Proximal;Ventral (anterior) Forearm <1 day                        Imaging: Reviewed radiology reports from this admission including: MRI brain  No orders to display       EKG and Other Studies Reviewed on Admission:   EKG: Sinus Tachycardia. .    ** Please Note: This note has been constructed using a voice recognition system. **

## 2024-02-08 NOTE — ASSESSMENT & PLAN NOTE
on the day of discharge patient has elevated WBC 10.98.  Recent Labs     02/07/24  0441 02/08/24  0450 02/08/24  1535   WBC 7.26 5.12 10.98*     It is afebrile, no clear source of infection at this moment.    Plan:  CBC in the a.m.

## 2024-02-08 NOTE — PLAN OF CARE
Problem: PHYSICAL THERAPY ADULT  Goal: Performs mobility at highest level of function for planned discharge setting.  See evaluation for individualized goals.  Description: Treatment/Interventions: ADL retraining, Functional transfer training, LE strengthening/ROM, Elevations, Therapeutic exercise, Endurance training, Patient/family training, Equipment eval/education, Bed mobility, Gait training, Compensatory technique education, Spoke to nursing, Spoke to MD, Spoke to case management, OT          See flowsheet documentation for full assessment, interventions and recommendations.  Note:    Problem List: Decreased strength, Decreased endurance, Impaired balance, Decreased mobility, Decreased coordination, Decreased safety awareness  Assessment: Patient seen for Physical Therapy evaluation. Patient admitted with Seizure-like activity (HCC).  Comorbidities affecting patient's physical performance include: Parkinson's, PE, lower extremity edema, CKD 3, generalized weakness.  Personal factors affecting patient at time of initial evaluation include: lives in bilevel [place] house, ambulating with assistive device, stairs to enter home, inability to navigate community distances, inability to navigate level surfaces without external assistance, and inability to perform dynamic tasks in community. Prior to admission, patient was independent with functional mobility with RW, requiring assist for ADLS, requiring assist for IADLS, living with spouse in a bilevel level home with 1 steps to enter, and ambulating household distance.  Please find objective findings from Physical Therapy assessment regarding body systems outlined above with impairments and limitations including weakness, impaired balance, decreased endurance, impaired coordination, gait deviations, decreased activity tolerance, decreased functional mobility tolerance, decreased safety awareness, and fall risk.  The Barthel Index was used as a functional outcome tool  presenting with a score of Barthel Index Score: 45 today indicating marked limitations of functional mobility and ADLS.  Patient's clinical presentation is currently unstable/unpredictable as seen in patient's presentation of vital sign response, increased fall risk, new onset of impairment of functional mobility, decreased endurance, and new onset of weakness. Pt would benefit from continued Physical Therapy treatment to address deficits as defined above and maximize level of functional mobility. As demonstrated by objective findings, the assigned level of complexity for this evaluation is high.The patient's [facility] Basic Mobility Inpatient Short Form Raw Score is 17. A Raw score of greater than 16 suggests the patient may benefit from discharge to home. Please also refer to the recommendation of the Physical Therapist for safe discharge planning.        Rehab Resource Intensity Level, PT: III (Minimum Resource Intensity)    See flowsheet documentation for full assessment.

## 2024-02-08 NOTE — OCCUPATIONAL THERAPY NOTE
"Occupational Therapy Screen     02/08/24 1817   OT Last Visit   OT Visit Date 02/08/24   Note Type   Note type Screen   Additional Comments OT follow up this afternoon to complete formal evaluation. Pt aware of role of OT and pleasantly declines at this time \"too tired\" and reports feeling as though OT services  not needed. At pt's request, pt removed from OT caseload. Please reconsult if pt with change in status. Thank you.     Tayler Rodriguez, OT    "

## 2024-02-08 NOTE — ED PROVIDER NOTES
History  Chief Complaint   Patient presents with    Seizure - Prior Hx Of     Patient presents for alleged seizure. States that she was just discharged from the hospital. States everything started shaking while she was in the car. Patient is weak and needed to be lifted from the car. GCS 15 in triage.          78-year-old female, just discharged from the hospital less than an hour prior, was admitted for seizure-like activity did not have this activity while she was here in the hospital, was discharged today, while driving home she says she was sitting next to her ,          Prior to Admission Medications   Prescriptions Last Dose Informant Patient Reported? Taking?   Calcium Carbonate-Vitamin D (CALCIUM-CARB 600 + D PO)   Yes No   Sig: Take 1 tablet by mouth daily   Cholecalciferol 50 MCG (2000 UT) CAPS   Yes No   Sig: Take 1 capsule by mouth daily   Diclofenac Sodium (VOLTAREN) 1 %   No No   Sig: Apply 2 g topically 4 (four) times a day   acetaminophen (TYLENOL) 325 mg tablet   Yes No   Sig: Take 325 mg by mouth every 6 (six) hours as needed for mild pain   apixaban (Eliquis) 5 mg   No No   Sig: Take 2 tablets (10 mg total) by mouth 2 (two) times a day for 7 days, THEN 1 tablet (5 mg total) 2 (two) times a day for 21 days.   carbidopa-levodopa (SINEMET)  mg per tablet   No No   Sig: Take 3 tablets by mouth 4 (four) times a day Please continue to take Sinemet as per your neurologist's instruction.   ezetimibe (ZETIA) 10 mg tablet   Yes No   Sig: Take by mouth Daily   levETIRAcetam (KEPPRA) 1000 MG tablet   No No   Sig: Take 1 tablet (1,000 mg total) by mouth every 12 (twelve) hours   potassium chloride (MICRO-K) 10 MEQ CR capsule   Yes No   Sig: Take 20 mEq by mouth Daily Per [facility] note from Nahomi Sandoval, DO - 12/19/2023 10:10 AM EST 20 meq.    triamterene-hydrochlorothiazide (DYAZIDE) 37.5-25 mg per capsule   Yes No   Sig: Take 1 capsule by mouth Daily      Facility-Administered Medications: None        Past Medical History:   Diagnosis Date    Anxiety     Hepatitis C 04/01/2017    screening Negative    High cholesterol     Hip pain     History of low potassium     Hyponatremia     Lower extremity edema     Obesity     Osteopenia     Overweight     Thigh pain        Past Surgical History:   Procedure Laterality Date    BLADDER SURGERY      CHOLECYSTECTOMY      COLOGUARD (HISTORICAL)  07/02/2018    COLONOSCOPY  01/01/2007    DXA PROCEDURE (HISTORICAL)  03/27/2014    MAMMO (HISTORICAL)  08/31/2016    REDUCTION MAMMAPLASTY      REPLACEMENT TOTAL KNEE Bilateral     TOTAL HIP ARTHROPLASTY Left 01/01/2015    TOTAL KNEE ARTHROPLASTY Left        Family History   Problem Relation Age of Onset    Glaucoma Mother     Hypertension Mother     Glaucoma Father     Leukemia Father     Hypertension Father      I have reviewed and agree with the history as documented.    E-Cigarette/Vaping    E-Cigarette Use Never User      E-Cigarette/Vaping Substances    Nicotine No     THC No     CBD No     Flavoring No     Other No     Unknown No      Social History     Tobacco Use    Smoking status: Never    Smokeless tobacco: Never   Vaping Use    Vaping status: Never Used   Substance Use Topics    Alcohol use: Yes     Comment: social, Occasional     Drug use: Never       Review of Systems    Physical Exam  Physical Exam  Vitals reviewed.   Constitutional:       General: She is not in acute distress.     Appearance: She is well-developed. She is not diaphoretic.   HENT:      Head: Normocephalic and atraumatic.      Right Ear: External ear normal.      Left Ear: External ear normal.      Nose: Nose normal.   Eyes:      General:         Right eye: No discharge.         Left eye: No discharge.      Pupils: Pupils are equal, round, and reactive to light.   Neck:      Trachea: No tracheal deviation.   Cardiovascular:      Rate and Rhythm: Regular rhythm. Tachycardia present.      Heart sounds: Normal heart sounds. No murmur heard.      Comments: Heart rate 100-1 15, sinus tachycardia on monitor  Pulmonary:      Effort: Pulmonary effort is normal. No respiratory distress.      Breath sounds: Normal breath sounds. No stridor.   Abdominal:      General: There is no distension.      Palpations: Abdomen is soft.      Tenderness: There is no abdominal tenderness. There is no guarding or rebound.   Musculoskeletal:         General: Normal range of motion.      Cervical back: Normal range of motion and neck supple.   Skin:     General: Skin is warm and dry.      Coloration: Skin is not pale.      Findings: No erythema.   Neurological:      General: No focal deficit present.      Mental Status: She is alert and oriented to person, place, and time.      Comments: GCS 15 alert and oriented x 3         Vital Signs  ED Triage Vitals   Temperature Pulse Respirations Blood Pressure SpO2   02/08/24 1520 02/08/24 1519 02/08/24 1519 02/08/24 1519 02/08/24 1519   98.5 °F (36.9 °C) (!) 115 18 147/82 96 %      Temp Source Heart Rate Source Patient Position - Orthostatic VS BP Location FiO2 (%)   02/08/24 1519 02/08/24 1519 02/08/24 1630 02/08/24 1630 --   Oral Monitor Lying Right arm       Pain Score       --                  Vitals:    02/08/24 1519 02/08/24 1630   BP: 147/82 125/65   Pulse: (!) 115 94   Patient Position - Orthostatic VS:  Lying         Visual Acuity      ED Medications  Medications   levETIRAcetam (KEPPRA) injection 2,000 mg (2,000 mg Intravenous Given 2/8/24 1610)       Diagnostic Studies  Results Reviewed       Procedure Component Value Units Date/Time    Comprehensive metabolic panel [[id number]]  (Abnormal) Collected: 02/08/24 1535    Lab Status: Final result Specimen: Blood from Arm, Left Updated: 02/08/24 1618     Sodium 136 mmol/L      Potassium 3.4 mmol/L      Chloride 101 mmol/L      CO2 10 mmol/L      ANION GAP 25 mmol/L      BUN 8 mg/dL      Creatinine 0.87 mg/dL      Glucose 135 mg/dL      Calcium 9.6 mg/dL      AST 6 U/L      ALT <3 U/L       Alkaline Phosphatase 87 U/L      Total Protein 7.1 g/dL      Albumin 4.4 g/dL      Total Bilirubin 1.18 mg/dL      eGFR 64 ml/min/1.73sq m     Narrative:      National Kid[employer] guidelines for Chronic Kidney Disease (CKD):     Stage 1 with normal or high GFR (GFR > 90 mL/min/1.73 square meters)    Stage 2 Mild CKD (GFR = 60-89 mL/min/1.73 square meters)    Stage 3A Moderate CKD (GFR = 45-59 mL/min/1.73 square meters)    Stage 3B Moderate CKD (GFR = 30-44 mL/min/1.73 square meters)    Stage 4 Severe CKD (GFR = 15-29 mL/min/1.73 square meters)    Stage 5 End Stage CKD (GFR <15 mL/min/1.73 square meters)  Note: GFR calculation is accurate only with a steady state creatinine    Protime-INR [[id number]]  (Abnormal) Collected: 02/08/24 1535    Lab Status: Final result Specimen: Blood from Arm, Left Updated: 02/08/24 1613     Protime 16.5 seconds      INR 1.26    APTT [[id number]]  (Normal) Collected: 02/08/24 1535    Lab Status: Final result Specimen: Blood from Arm, Left Updated: 02/08/24 1613     PTT 23 seconds     CBC and differential [[id number]]  (Abnormal) Collected: 02/08/24 1535    Lab Status: Final result Specimen: Blood from Arm, Left Updated: 02/08/24 1548     WBC 10.98 Thousand/uL      RBC 4.44 Million/uL      Hemoglobin 12.3 g/dL      Hematocrit 39.8 %      MCV 90 fL      MCH 27.7 pg      MCHC 30.9 g/dL      RDW 16.9 %      MPV 11.8 fL      Platelets 277 Thousands/uL      nRBC 0 /100 WBCs      Neutrophils Relative 79 %      Immat GRANS % 1 %      Lymphocytes Relative 10 %      Monocytes Relative 8 %      Eosinophils Relative 1 %      Basophils Relative 1 %      Neutrophils Absolute 8.74 Thousands/µL      Immature Grans Absolute 0.06 Thousand/uL      Lymphocytes Absolute 1.11 Thousands/µL      Monocytes Absolute 0.91 Thousand/µL      Eosinophils Absolute 0.10 Thousand/µL      Basophils Absolute 0.06 Thousands/µL                    No orders to display              Procedures  ECG 12 Lead  Documentation Only    Date/Time: 2/8/2024 5:15 PM    Performed by: Chuck Lantigua DO  Authorized by: Chuck Lantigua DO    ECG reviewed by me, the ED Provider: yes    Patient location:  ED  Interpretation:     Interpretation: non-specific    Rate:     ECG rate:  113    ECG rate assessment: tachycardic    Rhythm:     Rhythm: sinus rhythm    Ectopy:     Ectopy: none    QRS:     QRS axis:  Normal    QRS intervals:  Normal  Conduction:     Conduction: normal    ST segments:     ST segments:  Normal  T waves:     T waves: normal             ED Course  ED Course as of 02/08/24 1715   u Feb 08, 2024   1544 Discussed case with on-call neurology, requesting patient be transferred to [facility] for video EEG monitoring   1554 Discussed case with neurology team.,  Dr. Calvo, reviewed chart and remembers case from earlier today, requesting patient be transferred to [facility] for 24-hour video EEG monitoring      Explained this to family who agrees to the transfer.   1635 Discussed transfer with transfer center, patient was accepted by Dr. Clements, to stepdown level 2 for 24-hour of video EEG monitoring, unfortunately bed will not be available until at least tomorrow so will admit patient here until then.                                             Medical Decision Making        Initial ED assessment:   78-year-old female, just discharged an hour prior to repeat evaluation.,  Patient was admitted for seizure-like activity, upon discharge she was driving home with her ,  was driving, when she began having full body shaking, patient was conscious during this period of time then patient was brought back here to the emergency department    Initial DDx includes but is not limited to:   Recurrent seizure, anxiety state especially since she did not lose consciousness and was having bilateral hand shaking.  Considered rigors but patient does not have a fever    Initial ED plan:   Blood work,  neurology consultation, likely admission        Final ED summary/disposition:   After evaluation and workup in the emergency department, ultimately patient admitted here at [facility] with plan to transfer to [facility] for 24-hour video EEG monitoring once a bed becomes available.    Amount and/or Complexity of Data Reviewed  Labs: ordered.    Risk  Prescription drug management.  Decision regarding hospitalization.             Disposition  Final diagnoses:   Seizure-like activity (HCC)     Time reflects when diagnosis was documented in both MDM as applicable and the Disposition within this note       Time User Action Codes Description Comment    2/8/2024  3:34 PM Chuck Lantigua Add [R56.9] Seizure-like activity (HCC)           ED Disposition       ED Disposition   Admit    Condition   Stable    Date/Time   Thu Feb 8, 2024  4:36 PM    Comment   Case was discussed with Dr. Hall and the patient's admission status was agreed to be Admission Status: inpatient status to the service of Dr. Hall .               Follow-up Information    None         Patient's Medications   Discharge Prescriptions    No medications on file       No discharge procedures on file.    PDMP Review         Value Time User    PDMP Reviewed  Yes 2/7/2024  4:26 AM Deshawn Machuca PA-C            ED Provider  Electronically Signed by             Chuck Lantigua DO  02/08/24 3200

## 2024-02-08 NOTE — DISCHARGE SUMMARY
Critical access hospital  Discharge- Kamilla Pat 1945, 78 y.o. female MRN: [id number]  Unit/Bed#: S -[facility] Encounter: [id number]  Primary Care Provider: Olivia Blackwood MD   Date and time admitted to hospital: 2/6/2024  8:26 PM    * Seizure-like activity (HCC)  Assessment & Plan  Patient reports having seizure-like activity for the past 3 nights  Seizure-like activity tonight lasted for around 15 minutes  Patient family reports patient was found to have generalized shaking and arm jerking and then full body shaking with loss of consciousness  No urinary or bowel incontinence reported, tongue bite present on the left side  Patient was recently admitted on January 12, due to PE and was found to have seizure in the hospital  Patient was started on Keppra 750 Mg twice daily  MRI brain without contrast on 1/14/2024 with no acute infarction, intracranial hemorrhage or mass effect.  1/14: EEG: Abnormal with Generalized irregular delta activity   Lactic acid: 11.8 - 1.6     Workups:  CT head: Stable findings without acute intracranial abnormality.   MRI w/ con: Stable MRI of the brain with no acute intracranial abnormality. 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  EEG: This Routine EEG recorded during wakefulness, drowsiness, and sleep is abnormal. Background activities and posteriorly dominant rhythm are mildly too slow, suggesting mild diffuse cerebral dysfunction of nonspecific etiology.     PLAN:  Consult neurology. Appreciated recs.  Continue Keppra 1000 mg twice daily  Seizure precaution reviewed with patient and  at bedside.  Outpatient follow-up with neurologist.    Multiple subsegmental pulmonary emboli without acute cor pulmonale (HCC)  Assessment & Plan  Continue Eliquis 5 Mg twice daily    Pure hypercholesterolemia  Assessment & Plan  Continue ezetimibe    Parkinson disease  Assessment & Plan  Continue home dosage  Sinemet      Medical Problems       Resolved Problems  Date Reviewed: 2/8/2024            Resolved    Hyponatremia 2/7/2024     Resolved by  Ruperto Sexton MD        Discharging Resident: Ruperto Sexton MD  Discharging Attending: Twila Santamaria MD  PCP: Olivia Blackwood MD  Admission Date:   Admission Orders (From admission, onward)       Ordered        02/07/24 0116  Inpatient Admission  Once                          Discharge Date: 02/08/24    Consultations During Hospital Stay:  Neurology    Procedures Performed:   None    Significant Findings / Test Results:   MRI brain w wo contrast   Final Result by Garett Guevara DO (02/08 0847)      Stable MRI of the brain with no acute intracranial abnormality. 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      Workstation performed: [id number]         CT head without contrast   ED Interpretation by Deshawn Machuca PA-C (02/07 0022)   Reading Physician Reading Date Result Priority  Micky Bahena MD  [phone] 2/7/2024     Narrative & Impression  CT BRAIN - WITHOUT CONTRAST     INDICATION:   Seizure disorder, clinical change  hx of seizures; witnessed seizure this evening with loc.     COMPARISON: 1/14/2024.     TECHNIQUE:  CT examination of the brain was performed.  Multiplanar 2D reformatted images were created from the source data.     Radiation dose length product (DLP) for this visit:  946 mGy-cm .  This examination, like all CT scans performed in the [facility], was performed utilizing techniques to minimize radiation dose exposure, including the use of iterative   reconstruction and automated exposure control.     IMAGE QUALITY:  Diagnostic.     FINDINGS:     PARENCHYMA:  No intracranial mass, mass effect or midline shift. No CT signs of acute infarction.  No acute parenchymal hemorrhage.     VENTRICLES AND EXTRA-AXIAL SPACES: Stable moderate enlargement of the ventricles. Cerebral sulci    and  cisterns appear normal in size and configuration for chronological age..     VISUALIZED ORBITS: Normal visualized orbits.     PARANASAL SINUSES: Normal visualized paranasal sinuses.     CALVARIUM AND EXTRACRANIAL SOFT TISSUES: Unremarkable.     IMPRESSION:     Stable findings without acute intracranial abnormality.                 Workstation performed: [id number]        Final Result by Micky Bahena MD (02/07 0000)      Stable findings without acute intracranial abnormality.                  Workstation performed: [id number]               Incidental Findings:   None    Test Results Pending at Discharge (will require follow up):   None     Outpatient Tests Requested:  None    Complications:  None    Reason for Admission: Seizure-like activity    Hospital Course:   Kamilla Pat is a 78 y.o. female patient a PMH of Parkinson's disease, osteopenia, pulmonary embolism and seizures who originally presented to the hospital on 2/6/2024 due to  seizure-like activity for 15 minutes.  Patient reports having seizure-like activity for the past 3 nights however this one lasted for over 15 minutes.  Patient family reports patient having tonic-clonic movements starting from her mouth to her arm and then the whole body.  Patient was recently admitted on January 12, was found to have pulmonary embolism along with seizure-like activity which occurred in the hospital.  Patient was seen by neurology during that admission.  Patient was started on Keppra 750 Mg twice daily.  Imaging including MRI w/o contrast showed no acute infarction or intracranial hemorrhage.  Neurology was consulted again during this hospital stay with recommendation of increasing Keppra to 1000 mg twice daily.  CT head in the ED was unremarkable.  MRI with contrast was performed revealing no acute findings.  During her hospital stay, she did not have any recurrent episode of seizure-like activity.  She is medically stable to be discharged today.      Please see  above list of diagnoses and related plan for additional information.     Condition at Discharge: stable    Discharge Day Visit / Exam:   * Please refer to separate progress note for these details *    Discussion with Family: Updated  () at bedside.    Discharge instructions/Information to patient and family:   See after visit summary for information provided to patient and family.      Provisions for Follow-Up Care:  See after visit summary for information related to follow-up care and any pertinent home health orders.      Mobility at time of Discharge:   Basic Mobility Inpatient Raw Score: 17  JH-HLM Goal: 5: Stand one or more mins  JH-HLM Achieved: 7: Walk 25 feet or more  HLM Goal achieved. Continue to encourage appropriate mobility.     Disposition:   Home with VNA Services (Reminder: Complete face to face encounter)    Planned Readmission: None    Discharge Medications:  See after visit summary for reconciled discharge medications provided to patient and/or family.      **Please Note: This note may have been constructed using a voice recognition system**

## 2024-02-08 NOTE — ASSESSMENT & PLAN NOTE
Patient reports having seizure-like activity for the past 3 nights  Seizure-like activity tonight lasted for around 15 minutes  Patient family reports patient was found to have generalized shaking and arm jerking and then full body shaking with loss of consciousness  No urinary or bowel incontinence reported, tongue bite present on the left side  Patient was recently admitted on January 12, due to PE and was found to have seizure in the hospital  Patient was started on Keppra 750 Mg twice daily  MRI brain without contrast on 1/14/2024 with no acute infarction, intracranial hemorrhage or mass effect.  1/14: EEG: Abnormal with Generalized irregular delta activity   Lactic acid: 11.8 - 1.6     Workups:  CT head: Stable findings without acute intracranial abnormality.   MRI: Pending  EEG: Deferred this time    PLAN:  Consult neurology. Appreciated recs.  Increased Keppra 1000 mg twice daily

## 2024-02-08 NOTE — PLAN OF CARE
Problem: Prexisting or High Potential for Compromised Skin Integrity  Goal: Skin integrity is maintained or improved  Description: INTERVENTIONS:  - Identify patients at risk for skin breakdown  - Assess and monitor skin integrity  - Assess and monitor nutrition and hydration status  - Monitor labs   - Assess for incontinence   - Turn and reposition patient  - Assist with mobility/ambulation  - Relieve pressure over bony prominences  - Avoid friction and shearing  - Provide appropriate hygiene as needed including keeping skin clean and dry  - Evaluate need for skin moisturizer/barrier cream  - Collaborate with interdisciplinary team   - Patient/family teaching  - Consider wound care consult   Outcome: Progressing     Problem: PAIN - ADULT  Goal: Verbalizes/displays adequate comfort level or baseline comfort level  Description: Interventions:  - Encourage patient to monitor pain and request assistance  - Assess pain using appropriate pain scale  - Administer analgesics based on type and severity of pain and evaluate response  - Implement non-pharmacological measures as appropriate and evaluate response  - Consider cultural and social influences on pain and pain management  - Notify physician/advanced practitioner if interventions unsuccessful or patient reports new pain  Outcome: Progressing     Problem: INFECTION - ADULT  Goal: Absence or prevention of progression during hospitalization  Description: INTERVENTIONS:  - Assess and monitor for signs and symptoms of infection  - Monitor lab/diagnostic results  - Monitor all insertion sites, i.e. indwelling lines, tubes, and drains  - Monitor endotracheal if appropriate and nasal secretions for changes in amount and color  - [place] appropriate cooling/warming therapies per order  - Administer medications as ordered  - Instruct and encourage patient and family to use good hand hygiene technique  - Identify and instruct in appropriate isolation precautions for  identified infection/condition  Outcome: Progressing  Goal: Absence of fever/infection during neutropenic period  Description: INTERVENTIONS:  - Monitor WBC    Outcome: Progressing     Problem: SAFETY ADULT  Goal: Patient will remain free of falls  Description: INTERVENTIONS:  - Educate patient/family on patient safety including physical limitations  - Instruct patient to call for assistance with activity   - Consult OT/PT to assist with strengthening/mobility   - Keep Call bell within reach  - Keep bed low and locked with side rails adjusted as appropriate  - Keep care items and personal belongings within reach  - Initiate and maintain comfort rounds  - Make Fall Risk Sign visible to staff  - Offer Toileting every 2 Hours, in advance of need  - Initiate/Maintain bed and chair alarm  - Obtain necessary fall risk management equipment:   - Apply yellow socks and bracelet for high fall risk patients  - Consider moving patient to room near nurses station  Outcome: Progressing  Goal: Maintain or return to baseline ADL function  Description: INTERVENTIONS:  -  Assess patient's ability to carry out ADLs; assess patient's baseline for ADL function and identify physical deficits which impact ability to perform ADLs (bathing, care of mouth/teeth, toileting, grooming, dressing, etc.)  - Assess/evaluate cause of self-care deficits   - Assess range of motion  - Assess patient's mobility; develop plan if impaired  - Assess patient's need for assistive devices and provide as appropriate  - Encourage maximum independence but intervene and supervise when necessary  - Involve family in performance of ADLs  - Assess for home care needs following discharge   - Consider OT consult to assist with ADL evaluation and planning for discharge  - Provide patient education as appropriate  Outcome: Progressing  Goal: Maintains/Returns to pre admission functional level  Description: INTERVENTIONS:  - Perform AM-PAC 6 Click Basic Mobility/ Daily  Activity assessment daily.  - Set and communicate daily mobility goal to care team and patient/family/caregiver.   - Collaborate with rehabilitation services on mobility goals if consulted  - Perform Range of Motion 3 times a day.  - Reposition patient every 2 hours.  - Dangle patient 3 times a day  - Stand patient 3 times a day  - Ambulate patient 3 times a day  - Out of bed to chair 3 times a day   - Out of bed for meals 3 times a day  - Out of bed for toileting  - Record patient progress and toleration of activity level   Outcome: Progressing     Problem: DISCHARGE PLANNING  Goal: Discharge to home or other facility with appropriate resources  Description: INTERVENTIONS:  - Identify barriers to discharge w/patient and caregiver  - Arrange for needed discharge resources and transportation as appropriate  - Identify discharge learning needs (meds, wound care, etc.)  - Arrange for interpretive services to assist at discharge as needed  - Refer to Case Management Department for coordinating discharge planning if the patient needs post-hospital services based on physician/advanced practitioner order or complex needs related to functional status, cognitive ability, or social support system  Outcome: Progressing     Problem: Knowledge Deficit  Goal: Patient/family/caregiver demonstrates understanding of disease process, treatment plan, medications, and discharge instructions  Description: Complete learning assessment and assess knowledge base.  Interventions:  - Provide teaching at level of understanding  - Provide teaching via preferred learning methods  Outcome: Progressing

## 2024-02-08 NOTE — ASSESSMENT & PLAN NOTE
Lab Results   Component Value Date    EGFR 64 02/08/2024    EGFR 87 02/08/2024    EGFR 91 02/07/2024    CREATININE 0.87 02/08/2024    CREATININE 0.62 02/08/2024    CREATININE 0.55 (L) 02/07/2024     Plan:  Avoid nephrotoxins  Avoid hypotension.

## 2024-02-09 PROBLEM — D72.829 ELEVATED WBC COUNT: Status: RESOLVED | Noted: 2024-02-08 | Resolved: 2024-02-09

## 2024-02-09 LAB
ATRIAL RATE: 113 BPM
ATRIAL RATE: 113 BPM
BASOPHILS # BLD AUTO: 0.03 THOUSANDS/ÂΜL (ref 0–0.1)
BASOPHILS NFR BLD AUTO: 1 % (ref 0–1)
EOSINOPHIL # BLD AUTO: 0.08 THOUSAND/ÂΜL (ref 0–0.61)
EOSINOPHIL NFR BLD AUTO: 2 % (ref 0–6)
ERYTHROCYTE [DISTWIDTH] IN BLOOD BY AUTOMATED COUNT: 16.8 % (ref 11.6–15.1)
HCT VFR BLD AUTO: 31.5 % (ref 34.8–46.1)
HGB BLD-MCNC: 10.2 G/DL (ref 11.5–15.4)
IMM GRANULOCYTES # BLD AUTO: 0.02 THOUSAND/UL (ref 0–0.2)
IMM GRANULOCYTES NFR BLD AUTO: 1 % (ref 0–2)
LEVETIRACETAM SERPL-MCNC: 19.5 UG/ML (ref 10–40)
LYMPHOCYTES # BLD AUTO: 0.55 THOUSANDS/ÂΜL (ref 0.6–4.47)
LYMPHOCYTES NFR BLD AUTO: 13 % (ref 14–44)
MAGNESIUM SERPL-MCNC: 2 MG/DL (ref 1.9–2.7)
MCH RBC QN AUTO: 28 PG (ref 26.8–34.3)
MCHC RBC AUTO-ENTMCNC: 32.4 G/DL (ref 31.4–37.4)
MCV RBC AUTO: 87 FL (ref 82–98)
MONOCYTES # BLD AUTO: 0.44 THOUSAND/ÂΜL (ref 0.17–1.22)
MONOCYTES NFR BLD AUTO: 11 % (ref 4–12)
NEUTROPHILS # BLD AUTO: 3.09 THOUSANDS/ÂΜL (ref 1.85–7.62)
NEUTS SEG NFR BLD AUTO: 72 % (ref 43–75)
NRBC BLD AUTO-RTO: 0 /100 WBCS
P AXIS: 47 DEGREES
P AXIS: 66 DEGREES
PLATELET # BLD AUTO: 176 THOUSANDS/UL (ref 149–390)
PLATELET BLD QL SMEAR: ADEQUATE
PMV BLD AUTO: 11.8 FL (ref 8.9–12.7)
PR INTERVAL: 156 MS
PR INTERVAL: 170 MS
QRS AXIS: 12 DEGREES
QRS AXIS: 54 DEGREES
QRSD INTERVAL: 78 MS
QRSD INTERVAL: 86 MS
QT INTERVAL: 334 MS
QT INTERVAL: 342 MS
QTC INTERVAL: 458 MS
QTC INTERVAL: 469 MS
RBC # BLD AUTO: 3.64 MILLION/UL (ref 3.81–5.12)
RBC MORPH BLD: NORMAL
T WAVE AXIS: 38 DEGREES
T WAVE AXIS: 56 DEGREES
VENTRICULAR RATE: 113 BPM
VENTRICULAR RATE: 113 BPM
WBC # BLD AUTO: 4.21 THOUSAND/UL (ref 4.31–10.16)

## 2024-02-09 PROCEDURE — 99223 1ST HOSP IP/OBS HIGH 75: CPT | Performed by: PSYCHIATRY & NEUROLOGY

## 2024-02-09 PROCEDURE — 93010 ELECTROCARDIOGRAM REPORT: CPT | Performed by: INTERNAL MEDICINE

## 2024-02-09 PROCEDURE — 85025 COMPLETE CBC W/AUTO DIFF WBC: CPT

## 2024-02-09 PROCEDURE — 83735 ASSAY OF MAGNESIUM: CPT

## 2024-02-09 PROCEDURE — 99232 SBSQ HOSP IP/OBS MODERATE 35: CPT | Performed by: INTERNAL MEDICINE

## 2024-02-09 RX ORDER — CYANOCOBALAMIN 1000 UG/ML
1000 INJECTION, SOLUTION INTRAMUSCULAR; SUBCUTANEOUS
Status: DISCONTINUED | OUTPATIENT
Start: 2024-03-08 | End: 2024-02-11 | Stop reason: HOSPADM

## 2024-02-09 RX ORDER — CYANOCOBALAMIN 1000 UG/ML
1000 INJECTION, SOLUTION INTRAMUSCULAR; SUBCUTANEOUS WEEKLY
Status: DISCONTINUED | OUTPATIENT
Start: 2024-02-09 | End: 2024-02-11 | Stop reason: HOSPADM

## 2024-02-09 RX ORDER — ONDANSETRON 2 MG/ML
4 INJECTION INTRAMUSCULAR; INTRAVENOUS ONCE
Status: COMPLETED | OUTPATIENT
Start: 2024-02-09 | End: 2024-02-09

## 2024-02-09 RX ADMIN — ONDANSETRON 4 MG: 2 INJECTION INTRAMUSCULAR; INTRAVENOUS at 17:49

## 2024-02-09 RX ADMIN — CYANOCOBALAMIN 1000 MCG: 1000 INJECTION, SOLUTION INTRAMUSCULAR; SUBCUTANEOUS at 08:37

## 2024-02-09 RX ADMIN — CARBIDOPA AND LEVODOPA 3 TABLET: 25; 100 TABLET ORAL at 12:16

## 2024-02-09 RX ADMIN — LEVETIRACETAM 1000 MG: 500 TABLET, FILM COATED ORAL at 08:37

## 2024-02-09 RX ADMIN — APIXABAN 5 MG: 5 TABLET, FILM COATED ORAL at 17:04

## 2024-02-09 RX ADMIN — CARBIDOPA AND LEVODOPA 3 TABLET: 25; 100 TABLET ORAL at 17:04

## 2024-02-09 RX ADMIN — CARBIDOPA AND LEVODOPA 3 TABLET: 25; 100 TABLET ORAL at 21:03

## 2024-02-09 RX ADMIN — LEVETIRACETAM 1000 MG: 500 TABLET, FILM COATED ORAL at 21:03

## 2024-02-09 RX ADMIN — TRIAMTERENE AND HYDROCHLOROTHIAZIDE 1 TABLET: 37.5; 25 TABLET ORAL at 08:37

## 2024-02-09 RX ADMIN — EZETIMIBE 10 MG: 10 TABLET ORAL at 21:03

## 2024-02-09 RX ADMIN — Medication 1000 UNITS: at 08:37

## 2024-02-09 RX ADMIN — APIXABAN 5 MG: 5 TABLET, FILM COATED ORAL at 08:37

## 2024-02-09 RX ADMIN — POTASSIUM CHLORIDE 20 MEQ: 1500 TABLET, EXTENDED RELEASE ORAL at 08:37

## 2024-02-09 RX ADMIN — CARBIDOPA AND LEVODOPA 3 TABLET: 25; 100 TABLET ORAL at 08:37

## 2024-02-09 NOTE — ASSESSMENT & PLAN NOTE
Assessment:  Patient is a 78-year-old right-handed female with past medical history of Parkinson disease, seizure-like activity, hypercholesterolemia, CKD stage III, history of multiple subsegmental pulmonary emoli that presented to [facility] on February 8, 2024 for seizure-like activity.  Patient was discharged the same day, and shortly after discharge patient was reported to have had another episode of seizure described as jaw clenching, then subsequent generalization to involve whole body tonic-clonic activity, lasting 5 minutes.  Patient was given 2000 mg loading dose of levetiracetam, and is underway to transfer to [facility] for video EEG monitoring pending bed availability.     Impression:  Unclear reasoning as to why patient has been experiencing increasing seizure episodes.  Further evaluation with MRI demonstrated no acute intracranial pathology, and EEG demonstrated findings consistent with mild cerebral diffuse dysfunction of nonspecific etiology, video EEG monitoring is indicated for further spell characterization.     Plan:  - Case discussed with attending neurologist Dr. Calvo  - Maintain normotension, normothermia, euglycemia  - Recommend levetiracetam 1000 mg every 12 hours  - Recommend continuing carbidopa levodopa home dosing  -As part of [place] law, providers on medicine reporters of seizure, loss of consciousness events.  Patient was provided education in regards to driving cessation, seizure safety, precautions and of medication adherence provided, patient understanding.  [employer] paperwork will be completed and submitted.  - Continue to evaluate and treat any infectious, inflammatory, metabolic derangements  - Continue PT/OT  - Continue seizure precautions  - DVT prophylaxis per primary team  - Recommend 1 mg of intravenous lorazepam for generalized tonic-clonic seizure only lasting more than 3 minutes, please contact neurology  - Recommend noncontrast CT head,  STAT, to be completed for any acute change in mental status/neurologic examination, please contact neurology  - Outpatient follow-up recommendations to be established after video EEG monitoring at [facility] has been completed.  - Further outpatient imaging/workup recommendations to be established after video EEG monitoring at [facility] has been completed.  - Rest per primary team appreciated

## 2024-02-09 NOTE — ASSESSMENT & PLAN NOTE
Patient has history of anxiety.  Thinks that anxiety is provoking seizures.  Currently not on any medications.  Pulse: 83    Plan:  Consider Atarax.

## 2024-02-09 NOTE — DISCHARGE SUMMARY
[facility]  Discharge- Kamilla Pat 1945, 78 y.o. female MRN: [id number]  Unit/Bed#: S -01 Encounter: [id number]  Primary Care Provider: Olivia Blackwood MD   Date and time admitted to hospital: 2/8/2024  3:15 PM    * Seizure-like activity (HCC)  Assessment & Plan  Patient reports having seizure-like activity for the past 3 nights  Seizure-like activity tonight lasted for around 15 minutes  Patient family reports patient was found to have generalized shaking and arm jerking and then full body shaking with loss of consciousness  No urinary or bowel incontinence reported, tongue bite present on the left side  Patient was recently admitted on January 12, due to PE and was found to have seizure in the hospital  Patient was started on Keppra 750 Mg twice daily  MRI brain without contrast on 1/14/2024 with no acute infarction, intracranial hemorrhage or mass effect.  1/14: EEG: Abnormal with Generalized irregular delta activity   Lactic acid: 11.8 - 1.6   B12 low  Patient was readmitted on the same-day of discharge. Recurrent similar seizure episode on the way home. Neuro- oncall recommended xferring to [facility] for vEEG.    Workups:  CT head: Stable findings without acute intracranial abnormality.   MRI w/ con: Stable MRI of the brain with no acute intracranial abnormality. 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  EEG: This Routine EEG recorded during wakefulness, drowsiness, and sleep is abnormal. Background activities and posteriorly dominant rhythm are mildly too slow, suggesting mild diffuse cerebral dysfunction of nonspecific etiology.     2/10: 2 episodes of seizure activities. AM with urinary incontinence and postictal confusion per nursing. PM with tonic-clonic activities only.    PLAN:  Neurology consult.  Continue Keppra 1500 mg twice daily.  Started on Depakote  mg twice daily per neurology.  Video EEG- pending transfer  to [facility].  Ativan 1 mg IV as needed for persistent seizure activity over 3 minutes.  Seizure precaution reviewed with patient  B12 IM.    CKD (chronic kidney disease) stage 2, GFR 60-89 ml/min  Assessment & Plan  Lab Results   Component Value Date    EGFR 74 02/11/2024    EGFR 86 02/10/2024    EGFR 84 02/08/2024    CREATININE 0.78 02/11/2024    CREATININE 0.66 02/10/2024    CREATININE 0.69 02/08/2024   At baseline  Plan:  Avoid nephrotoxins  Avoid hypotension.    History of pulmonary embolism  Assessment & Plan  Diagnosed with PE on 1/11/2024.  Currently on Eliquis 5 mg twice a day.  As per her previous discharge summary she has to be on Eliquis for total 6 months.    Plan:  Continue Eliquis 5 mg twice daily    Anxiety  Assessment & Plan  Patient has history of anxiety.  Thinks that anxiety is provoking seizures.  Currently not on any medications.  Pulse: 98    Plan:  Consider Atarax.      Parkinson disease  Assessment & Plan  Currently taking Sinemet immediate release 3 tablets 4 times daily.  Patient was seen by movement disorder specialist in the month of December 2023 and increase the dose.  The first-time seizure that she had was in December/2023.  Patient does not recall if the seizure onset is prior to or after the dose adjustment.  She has been feeling restless, agitated, anxious, and having insomnia.  Suspecting increase in dopamine levels.     Plan:  Continue home dosage Sinemet for now or per neurology.        Medical Problems       Resolved Problems  Date Reviewed: 2/11/2024            Resolved    Elevated WBC count 2/9/2024     Resolved by  Ruperto Sexton MD        Discharging Resident: Ruperto Sexton MD  Discharging Attending: Twila Santamaria MD  PCP: Olivia Blackwood MD  Admission Date:   Admission Orders (From admission, onward)       Ordered        02/08/24 1636  INPATIENT ADMISSION  Once                          Discharge Date: 02/11/24    Consultations During Hospital Stay:  Neurology    Procedures  Performed:   none    Significant Findings / Test Results:   none    Incidental Findings:     none    Test Results Pending at Discharge (will require follow up):  Video EEG at [place]     Outpatient Tests Requested:  Video EEG    Complications:  none    Reason for Admission: suspected seizure     Hospital Course:   Kamilla Pat is a 78 y.o. female patient who originally presented to the hospital on 2/8/2024 due to suspected seizure disorder.she has history of Parkinson disease, pulmonary embolism and recent diagnosis of seizure disorder presented to the hospital initially on 2/6 due to seizure-like activity. Patient had already been started on Keppra during a prior hospitalization. Neurology consulted and recommended increasing Keppra to 1000 mg twice daily. Patient did undergo repeat MRI which did not show any new infarction or intracranial hemorrhage. Remainder of hospitalization was uncomplicated and patient was discharged home. On her way home, patient had another episode of seizure-like activity. No bowel or bladder incontinence. No loss of consciousness. Patient was aware of the event. ED discussed case with neurology who are recommending transfer to [facility] for VEEG monitoring. During her current hospital stay, patient did have 2 episodes of seizure activities.  Per neurology, patient was put Keppra 1500 mg twice daily, Depakote 500 mg twice daily. Patient to be transferred to [facility] for VEEG today.      Please see above list of diagnoses and related plan for additional information.     Condition at Discharge: stable      Discussion with Family: Attempted to update  () via phone. Left voicemail.     Discharge instructions/Information to patient and family:   See after visit summary for information provided to patient and family.      Provisions for Follow-Up Care:  See after visit summary for information related to follow-up care and any pertinent home health orders.       Mobility at time of Discharge:   Basic Mobility Inpatient Raw Score: 17  JH-HLM Goal: 5: Stand one or more mins  JH-HLM Achieved: 3: Sit at edge of bed  HLM Goal NOT achieved. Continue to encourage mobility in post discharge setting.     Disposition:   Other: [facility]    Planned Readmission: yes    Discharge Medications:  See after visit summary for reconciled discharge medications provided to patient and/or family.      **Please Note: This note may have been constructed using a voice recognition system**

## 2024-02-09 NOTE — QUICK NOTE
[employer] paperwork completed and submitted via fax on Friday, February 9, 2024 at 1151.  Paper copy placed in patient's chart.

## 2024-02-09 NOTE — ASSESSMENT & PLAN NOTE
on the day of discharge patient has elevated WBC 10.98.  Recent Labs     02/08/24  0450 02/08/24  1535 02/09/24  0630   WBC 5.12 10.98* 4.21*     It is afebrile, no clear source of infection at this moment.    Plan:  CBC in the a.m.

## 2024-02-09 NOTE — CONSULTS
NEUROLOGY RESIDENCY CONSULT NOTE     Name: Kamilla Pat   Age & Sex: 78 y.o. female   MRN: [id number]  Unit/Bed#: S -01   Encounter: [id number]  Length of Stay: 1    Recommendations for outpatient neurological follow up have yet to be determined -outpatient recommendations to be provided by team as send expect them after video EEG monitoring has been completed.    Pending for discharge: Pending bed availability at [facility] for video EEG monitoring    ASSESSMENT & PLAN     * Seizure-like activity (HCC)  Assessment & Plan  Assessment:  Patient is a 78-year-old right-handed female with past medical history of Parkinson disease, seizure-like activity, hypercholesterolemia, CKD stage III, history of multiple subsegmental pulmonary emoli that presented to [facility] on February 8, 2024 for seizure-like activity.  Patient was discharged the same day, and shortly after discharge patient was reported to have had another episode of seizure described as jaw clenching, then subsequent generalization to involve whole body tonic-clonic activity, lasting 5 minutes.  Patient was given 2000 mg loading dose of levetiracetam, and is underway to transfer to [facility] for video EEG monitoring pending bed availability.     Impression:  Unclear reasoning as to why patient has been experiencing increasing seizure episodes.  Further evaluation with MRI demonstrated no acute intracranial pathology, and EEG demonstrated findings consistent with mild cerebral diffuse dysfunction of nonspecific etiology, video EEG monitoring is indicated for further spell characterization.     Plan:  - Case discussed with attending neurologist Dr. Calvo  - Maintain normotension, normothermia, euglycemia  - Recommend levetiracetam 1000 mg every 12 hours  - Recommend continuing carbidopa levodopa home dosing  -As part of [place] law, providers on medicine reporters of seizure, loss of consciousness events.  Patient was provided  education in regards to driving cessation, seizure safety, precautions and of medication adherence provided, patient understanding.  [employer] paperwork will be completed and submitted.  - Continue to evaluate and treat any infectious, inflammatory, metabolic derangements  - Continue PT/OT  - Continue seizure precautions  - DVT prophylaxis per primary team  - Recommend 1 mg of intravenous lorazepam for generalized tonic-clonic seizure only lasting more than 3 minutes, please contact neurology  - Recommend noncontrast CT head, STAT, to be completed for any acute change in mental status/neurologic examination, please contact neurology  - Outpatient follow-up recommendations to be established after video EEG monitoring at [facility] has been completed.  - Further outpatient imaging/workup recommendations to be established after video EEG monitoring at [facility] has been completed.  - Rest per primary team appreciated      SUBJECTIVE     Reason for Consult / Principal Problem: Seizure  Hx and PE limited by: None    HPI: Kamilla Pat is a 78 y.o. right handed female with past medical history of Parkinson disease, seizure disorder, hypercholesterolemia, CKD stage III, history of multiple segmental pulmonary emboli that presented to [facility] on February 8, 2024 for seizure activity.  The patient was discharged on February 8, 2024 after admission for recurrent seizure during which MRI, and EEG were completed, and patient's levetiracetam dosing increased to 1000 mg every 12 hours.  It was reported that shortly after discharge on her way home, the patient had further seizure activity, and patient was subsequently brought back to the ED.    Blood pressure on presentation 147/82, pulse 150, respirations 18, SpO2 96% on room air    She was given a 2000 mg loading dose of levetiracetam, and home dosing of 1000 mg every 12 hours to be continued whilst awaiting bed availability at [facility] for video EEG monitoring.    The patient was accompanied by her  at bedside, reported that less than 5 minutes after leaving the hospital, the patient demonstrated another episode where she experienced jaw clenching, and then stiffening of the upper extremities with general tonic-clonic shaking.  The event was reported to have lasted around 5 minutes.  The patient reported that she is able to remember the entire episode.  The patient's  had a video of the patient's episode that occurred on admission on February 6, 2024 which demonstrated the seizure episode appearing to be focal in onset with generalizing.  This video was asked to be presented to the team at [facility] when a bed becomes available for video EEG monitoring.    Lab Reviewed:  - Magnesium in process  - CBC in process  - Vitamin B12 153  - Sodium 135, potassium 3.8, anion gap 9, BUN 8, creatinine 0.69, glucose 97, EGFR 84  - Hemoglobin A1c 5.2  - PT 16.5, PTT 23, INR 1.26  - TSH 4.419  - Levetiracetam level, 2/6/2024, 19.5    Inpatient consult to Neurology  Consult performed by: Keaton Richards DO  Consult ordered by: Chuck Lantigua DO        Historical Information   Past Medical History:   Diagnosis Date    Anxiety     Hepatitis C 04/01/2017    screening Negative    High cholesterol     Hip pain     History of low potassium     Hyponatremia     Lower extremity edema     Obesity     Osteopenia     Overweight     Thigh pain      Past Surgical History:   Procedure Laterality Date    BLADDER SURGERY      CHOLECYSTECTOMY      COLOGUARD (HISTORICAL)  07/02/2018    COLONOSCOPY  01/01/2007    DXA PROCEDURE (HISTORICAL)  03/27/2014    MAMMO (HISTORICAL)  08/31/2016    REDUCTION MAMMAPLASTY      REPLACEMENT TOTAL KNEE Bilateral     TOTAL HIP ARTHROPLASTY Left 01/01/2015    TOTAL KNEE ARTHROPLASTY Left      Social History   Social History     Substance and Sexual Activity   Alcohol Use Yes    Comment: social, Occasional       Social History     Substance and Sexual Activity   Drug Use Never     E-Cigarette/Vaping    E-Cigarette Use Never User      E-Cigarette/Vaping Substances    Nicotine No     THC No     CBD No     Flavoring No     Other No     Unknown No      Social History     Tobacco Use   Smoking Status Never   Smokeless Tobacco Never     Family History:   Family History   Problem Relation Age of Onset    Glaucoma Mother     Hypertension Mother     Glaucoma Father     Leukemia Father     Hypertension Father      Meds/Allergies   current meds:   Current Facility-Administered Medications   Medication Dose Route Frequency    apixaban (ELIQUIS) tablet 5 mg  5 mg Oral BID    carbidopa-levodopa (SINEMET)  mg per tablet 3 tablet  3 tablet Oral 4x Daily    cholecalciferol (VITAMIN D3) tablet 1,000 Units  1,000 Units Oral Daily    cyanocobalamin injection 1,000 mcg  1,000 mcg Intramuscular Weekly    Followed by    [START ON 3/8/2024] cyanocobalamin injection 1,000 mcg  1,000 mcg Intramuscular Q30 Days    ezetimibe (ZETIA) tablet 10 mg  10 mg Oral HS    levETIRAcetam (KEPPRA) tablet 1,000 mg  1,000 mg Oral Q12H CESAR    LORazepam (ATIVAN) injection 1 mg  1 mg Intravenous Q6H PRN    potassium chloride (Klor-Con M20) CR tablet 20 mEq  20 mEq Oral Daily    triamterene-hydrochlorothiazide (MAXZIDE-25) 37.5-25 mg per tablet 1 tablet  1 tablet Oral Daily    and PTA meds:   Prior to Admission Medications   Prescriptions Last Dose Informant Patient Reported? Taking?   Calcium Carbonate-Vitamin D (CALCIUM-CARB 600 + D PO)   Yes No   Sig: Take 1 tablet by mouth daily   Cholecalciferol 50 MCG (2000 UT) CAPS   Yes No   Sig: Take 1 capsule by mouth daily   Diclofenac Sodium (VOLTAREN) 1 %   No No   Sig: Apply 2 g topically 4 (four) times a day   acetaminophen (TYLENOL) 325 mg tablet   Yes No   Sig: Take 325 mg by mouth every 6 (six) hours as needed for mild pain   apixaban (Eliquis) 5 mg   No No   Sig: Take 2 tablets (10 mg total) by mouth 2  (two) times a day for 7 days, THEN 1 tablet (5 mg total) 2 (two) times a day for 21 days.   carbidopa-levodopa (SINEMET)  mg per tablet   No No   Sig: Take 3 tablets by mouth 4 (four) times a day Please continue to take Sinemet as per your neurologist's instruction.   ezetimibe (ZETIA) 10 mg tablet   Yes No   Sig: Take by mouth Daily   levETIRAcetam (KEPPRA) 1000 MG tablet   No No   Sig: Take 1 tablet (1,000 mg total) by mouth every 12 (twelve) hours   potassium chloride (MICRO-K) 10 MEQ CR capsule   Yes No   Sig: Take 20 mEq by mouth Daily Per [facility] note from Nahomi Sandoval, DO - 12/19/2023 10:10 AM EST 20 meq.    triamterene-hydrochlorothiazide (DYAZIDE) 37.5-25 mg per capsule   Yes No   Sig: Take 1 capsule by mouth Daily      Facility-Administered Medications: None     Allergies   Allergen Reactions    Hydrocodone-Acetaminophen GI Intolerance    Hydromorphone GI Intolerance    Oxycodone-Acetaminophen GI Intolerance    Statins Myalgia     Review of previous medical records was  completed.     Review of Systems   Constitutional:  Positive for fatigue. Negative for activity change, appetite change and fever.   HENT:  Negative for ear discharge, facial swelling, postnasal drip, sneezing, trouble swallowing and voice change.    Eyes:  Negative for photophobia and visual disturbance.   Cardiovascular:  Negative for chest pain and palpitations.   Gastrointestinal:  Negative for diarrhea, nausea and vomiting.   Genitourinary:  Negative for frequency and urgency.   Musculoskeletal:  Negative for neck pain and neck stiffness.   Skin:  Negative for color change.   Neurological:  Positive for tremors and seizures. Negative for dizziness, syncope, facial asymmetry, speech difficulty, weakness, light-headedness, numbness and headaches.   Psychiatric/Behavioral:  Negative for agitation, behavioral problems, confusion, decreased concentration, hallucinations, self-injury, sleep disturbance and suicidal ideas. The patient  is not nervous/anxious and is not hyperactive.        OBJECTIVE     Patient ID: Kamilla Pat is a 78 y.o. female.    Vitals:   Vitals:    24 0310 24 0749 24 0846 24 1504   BP:  156/79  133/68   BP Location:    Right arm   Pulse:  83  77   Resp:  16  18   Temp:  98 °F (36.7 °C)  98.2 °F (36.8 °C)   TempSrc:    Oral   SpO2: 98% 95% 92% 92%      There is no height or weight on file to calculate BMI.   No intake or output data in the 24 hours ending 24 1620    Temperature:   Temp (24hrs), Av.2 °F (36.8 °C), Min:97.5 °F (36.4 °C), Max:98.9 °F (37.2 °C)    Temperature: 98.2 °F (36.8 °C)    Invasive Devices:   Invasive Devices       Peripheral Intravenous Line  Duration             Peripheral IV 24 Left;Proximal;Ventral (anterior) Forearm 1 day                    Physical Exam  Vitals and nursing note reviewed.   Constitutional:       General: She is not in acute distress.     Appearance: She is well-developed. She is not toxic-appearing.   HENT:      Head: Normocephalic and atraumatic.      Mouth/Throat:      Mouth: Mucous membranes are moist.      Pharynx: No oropharyngeal exudate.   Eyes:      Extraocular Movements: Extraocular movements intact and EOM normal.      Conjunctiva/sclera: Conjunctivae normal.      Pupils: Pupils are equal, round, and reactive to light.   Cardiovascular:      Rate and Rhythm: Normal rate and regular rhythm.   Pulmonary:      Effort: No respiratory distress.   Abdominal:      General: There is no distension.   Musculoskeletal:         General: No swelling.      Cervical back: Neck supple.      Right lower leg: No edema.      Left lower leg: No edema.   Skin:     General: Skin is warm and dry.      Capillary Refill: Capillary refill takes less than 2 seconds.   Neurological:      Mental Status: She is alert.      Coordination: Finger-Nose-Finger Test normal.      Deep Tendon Reflexes:      Reflex Scores:       Tricep reflexes are 2+ on the right side  and 2+ on the left side.       Bicep reflexes are 2+ on the right side and 2+ on the left side.       Brachioradialis reflexes are 2+ on the right side and 2+ on the left side.       Patellar reflexes are 0 on the right side and 0 on the left side.       Achilles reflexes are 2+ on the right side and 2+ on the left side.  Psychiatric:         Mood and Affect: Mood normal.         Speech: Speech normal.          Neurologic Exam     Mental Status   Oriented to person.   Oriented to place. Oriented to country.   Oriented to time. Oriented to year, month, date and day.   Follows 3 step commands.   Attention: normal. Concentration: normal.   Speech: speech is normal   Level of consciousness: alert  Able to perform simple calculations.   Able to name object. Able to repeat. Normal comprehension.     Cranial Nerves     CN II   Right visual field deficit: none  Left visual field deficit: none     CN III, IV, VI   Pupils are equal, round, and reactive to light.  Extraocular motions are normal.   Right pupil: Size: 4 mm. Shape: regular. Reactivity: brisk. Accommodation: intact.   Left pupil: Size: 4 mm. Shape: regular. Reactivity: brisk. Accommodation: intact.   CN III: no CN III palsy  CN VI: no CN VI palsy  Nystagmus: none   Diplopia: none  Ophthalmoparesis: none    CN V   Right facial sensation deficit: none  Left facial sensation deficit: none    CN VII   Right facial weakness: none  Left facial weakness: none    CN IX, X   Palate: symmetric    CN XI   Right sternocleidomastoid strength: normal  Left sternocleidomastoid strength: normal  Right trapezius strength: normal  Left trapezius strength: normal    CN XII   Tongue: not atrophic  Fasciculations: absent  Tongue deviation: none  Hearing grossly intact     Motor Exam   Muscle bulk: normal  Right arm tone: normal  Left arm tone: normal  Right arm pronator drift: absent  Left arm pronator drift: absent  Right leg tone: normal  Left leg tone: normal  Patient demonstrated  grossly 4+/5 in all 4 extremities in proximal and distal compartments.     Sensory Exam   Right arm light touch: normal  Left arm light touch: normal  Right leg light touch: normal  Left leg light touch: normal    Gait, Coordination, and Reflexes     Coordination   Finger to nose coordination: normal    Tremor   Resting tremor: present  Intention tremor: absent  Action tremor: absent    Reflexes   Right brachioradialis: 2+  Left brachioradialis: 2+  Right biceps: 2+  Left biceps: 2+  Right triceps: 2+  Left triceps: 2+  Right patellar: 0  Left patellar: 0  Right achilles: 2+  Left achilles: 2+  Right plantar: normal  Left plantar: normal  Right Cantu: absent  Left Cantu: absent  Right ankle clonus: absent  Left ankle clonus: absent       LABORATORY DATA     Labs: I have personally reviewed pertinent reports.    Results from last 7 days   Lab Units 02/09/24  0630 02/08/24  1535 02/08/24  0450 02/07/24 0441 02/06/24  2107   WBC Thousand/uL 4.21* 10.98* 5.12   < > 6.44   HEMOGLOBIN g/dL 10.2* 12.3 10.9*   < > 12.5   HEMATOCRIT % 31.5* 39.8 33.8*   < > 39.8   PLATELETS Thousands/uL 176 277 228   < > 257   NEUTROS PCT % 72 79*  --   --  78*   MONOS PCT % 11 8  --   --  6   EOS PCT % 2 1  --   --  0    < > = values in this interval not displayed.      Results from last 7 days   Lab Units 02/08/24  1842 02/08/24  1535 02/08/24  0450 02/07/24 0441 02/06/24  2110   POTASSIUM mmol/L 3.8 3.4* 3.2*   < > 3.1*   CHLORIDE mmol/L 104 101 105   < > 98   CO2 mmol/L 22 10* 22   < > 9*   BUN mg/dL 8 8 6   < > 10   CREATININE mg/dL 0.69 0.87 0.62   < > 0.88   CALCIUM mg/dL 9.0 9.6 8.9   < > 9.5   ALK PHOS U/L  --  87  --   --  92   ALT U/L  --  <3*  --   --  <3*   AST U/L  --  6*  --   --  7*    < > = values in this interval not displayed.     Results from last 7 days   Lab Units 02/09/24  0630 02/06/24  2110   MAGNESIUM mg/dL 2.0 2.1     Results from last 7 days   Lab Units 02/06/24  2110   PHOSPHORUS mg/dL 3.9      Results from  last 7 days   Lab Units 02/08/24  1535 02/06/24  2107   INR  1.26* 1.04   PTT seconds 23 24     Results from last 7 days   Lab Units 02/06/24  2340   LACTIC ACID mmol/L 1.6           IMAGING & DIAGNOSTIC TESTING     Radiology Results: I have personally reviewed pertinent reports.    No orders to display       Other Diagnostic Testing: I have personally reviewed pertinent reports.      ACTIVE MEDICATIONS     Current Facility-Administered Medications   Medication Dose Route Frequency    apixaban (ELIQUIS) tablet 5 mg  5 mg Oral BID    carbidopa-levodopa (SINEMET)  mg per tablet 3 tablet  3 tablet Oral 4x Daily    cholecalciferol (VITAMIN D3) tablet 1,000 Units  1,000 Units Oral Daily    cyanocobalamin injection 1,000 mcg  1,000 mcg Intramuscular Weekly    Followed by    [START ON 3/8/2024] cyanocobalamin injection 1,000 mcg  1,000 mcg Intramuscular Q30 Days    ezetimibe (ZETIA) tablet 10 mg  10 mg Oral HS    levETIRAcetam (KEPPRA) tablet 1,000 mg  1,000 mg Oral Q12H CESAR    LORazepam (ATIVAN) injection 1 mg  1 mg Intravenous Q6H PRN    potassium chloride (Klor-Con M20) CR tablet 20 mEq  20 mEq Oral Daily    triamterene-hydrochlorothiazide (MAXZIDE-25) 37.5-25 mg per tablet 1 tablet  1 tablet Oral Daily     Prior to Admission medications    Medication Sig Start Date End Date Taking? Authorizing Provider   acetaminophen (TYLENOL) 325 mg tablet Take 325 mg by mouth every 6 (six) hours as needed for mild pain    Historical Provider, MD   apixaban (Eliquis) 5 mg Take 2 tablets (10 mg total) by mouth 2 (two) times a day for 7 days, THEN 1 tablet (5 mg total) 2 (two) times a day for 21 days. 1/12/24 2/9/24  Jonathan Jenkins MD   Calcium Carbonate-Vitamin D (CALCIUM-CARB 600 + D PO) Take 1 tablet by mouth daily    Historical Provider, MD   carbidopa-levodopa (SINEMET)  mg per tablet Take 3 tablets by mouth 4 (four) times a day Please continue to take Sinemet as per your neurologist's instruction. 2/8/24 3/9/24  Yuan  MD Darshan   Cholecalciferol 50 MCG (2000 UT) CAPS Take 1 capsule by mouth daily    Historical Provider, MD   Diclofenac Sodium (VOLTAREN) 1 % Apply 2 g topically 4 (four) times a day 8/15/23   [facility] Cindi Christianson MD   ezetimibe (ZETIA) 10 mg tablet Take by mouth Daily 4/1/19   Historical Provider, MD   levETIRAcetam (KEPPRA) 1000 MG tablet Take 1 tablet (1,000 mg total) by mouth every 12 (twelve) hours 2/8/24 3/9/24  Ruperto Sexton MD   potassium chloride (MICRO-K) 10 MEQ CR capsule Take 20 mEq by mouth Daily Per [facility] note from Nahomi Sandoval DO - 12/19/2023 10:10 AM EST 20 meq.  4/1/19   Historical Provider, MD   triamterene-hydrochlorothiazide (DYAZIDE) 37.5-25 mg per capsule Take 1 capsule by mouth Daily 4/1/19   Historical Provider, MD     CODE STATUS & ADVANCED DIRECTIVES     Code Status: Level 1 - Full Code  Advance Directive and Living Will:      Power of :    POLST:        VTE Pharmacologic Prophylaxis: Per Primary Team  VTE Mechanical Prophylaxis: Per Primary Team    ======    I have discussed the patient's history, physical exam findings, assessment, and plan in detail with attending, Dr. Calvo    Thank you for allowing me to participate in the care of your patient, Kamilla Pat.    Keaton Richards,   [facility] Neurology Residency, PGY-3

## 2024-02-09 NOTE — ASSESSMENT & PLAN NOTE
Lab Results   Component Value Date    EGFR 84 02/08/2024    EGFR 64 02/08/2024    EGFR 87 02/08/2024    CREATININE 0.69 02/08/2024    CREATININE 0.87 02/08/2024    CREATININE 0.62 02/08/2024     Plan:  Avoid nephrotoxins  Avoid hypotension.

## 2024-02-09 NOTE — CASE MANAGEMENT
Case Management Assessment & Discharge Planning Note    Patient name Kamilla Pat  Location S /S -01 MRN [id number]  : 1945 Date 2024       Current Admission Date: 2024  Current Admission Diagnosis:Seizure-like activity (HCC)   Patient Active Problem List    Diagnosis Date Noted    Anxiety 2024    History of pulmonary embolism 2024    CKD (chronic kidney disease) stage 2, GFR 60-89 ml/min 2024    Elevated WBC count 2024    Generalized weakness 2024    Seizure-like activity (HCC) 2024    Ambulatory dysfunction 2024    Multiple subsegmental pulmonary emboli without acute cor pulmonale (HCC) 2024    Pure hypercholesterolemia 06/10/2020    Edema, lower extremity 06/10/2020    CKD (chronic kidney disease) stage 3, GFR 30-59 ml/min (HCC) 06/10/2020    Parkinson disease 2020      LOS (days): 1  Geometric Mean LOS (GMLOS) (days): 2.7  Days to GMLOS:1.9     OBJECTIVE:  PATIENT READMITTED TO HOSPITAL  Risk of Unplanned Readmission Score: 18.48         Current admission status: Inpatient       Preferred Pharmacy:   [facility]/pharmacy #[id number] - [place] - [facility] [address] [address]  Phone: [phone] Fax: [phone]    Primary Care Provider: Olivia Blackwood MD    Primary Insurance: MEDICARE  Secondary Insurance: A[employer]    ASSESSMENT:  Active Health Care Proxies    There are no active Health Care Proxies on file.                 Readmission Root Cause  30 Day Readmission: Yes  Who directed you to return to the hospital?: Family  Did you understand whom to contact if you had questions or problems?: Yes  Did you get your prescriptions before you left the hospital?: Yes  Were you able to get your prescriptions filled when you left the hospital?: Yes  Did you take your medications as prescribed?: Yes  Were you able to get to your follow-up appointments?: No  Reason:: Readmitted prior to appointment  During previous  admission, was a post-acute recommendation made?: Yes  What post-acute resources were offered?: [facility]  Patient was readmitted due to: Seizure like activity  Action Plan: Neuro consult    Patient Information  Admitted from:: Home  Mental Status: Alert  During Assessment patient was accompanied by: Spouse  Assessment information provided by:: Patient, Spouse  Primary Caregiver: Self  Support Systems: Self, Spouse/significant other  County of Residence: [place]  What city do you live in?: [place]  Home entry access options. Select all that apply.: No steps to enter home  Type of Current Residence: Bi-level  Upon entering residence, is there a bedroom on the main floor (no further steps)?: No (3 Steps to living room, then 6 steps to bedroom)  Upon entering residence, is there a bathroom on the main floor (no further steps)?: No (3 steps to living room, then 6 steps to bathroom)  Living Arrangements: Lives w/ Spouse/significant other  Is patient a ?: No    Activities of Daily Living Prior to Admission  Functional Status: Independent  Completes ADLs independently?: Yes  Ambulates independently?: Yes  Does patient use assisted devices?: No  Does patient currently own DME?: No  Does patient have a history of Outpatient Therapy (PT/OT)?: No  Does the patient have a history of Short-Term Rehab?: No  Does patient have a history of HHC?: Yes  Does patient currently have HHC?: Yes    Current Home Health Care  Type of Current Home Care Services: Home OT, Home PT  Current Home Health Agency:: [facility]  Current Home Health Follow-Up Provider:: PCP    Patient Information Continued  Income Source: Pension/skilled nursing  Does patient have prescription coverage?: Yes  Does patient receive dialysis treatments?: No  Does patient have a history of substance abuse?: No  Does patient have a history of Mental Health Diagnosis?: No    PHQ 2/9 Screening   Reviewed PHQ 2/9 Depression Screening Score?: No    Means of  Transportation  Means of Transport to Appts:: Family transport      Housing Stability: Low Risk  (2/9/2024)    Housing Stability Vital Sign     Unable to Pay for Housing in the Last Year: No     Number of Places Lived in the Last Year: 1     Unstable Housing in the Last Year: No   Food Insecurity: No Food Insecurity (2/9/2024)    Hunger Vital Sign     Worried About Running Out of Food in the Last Year: Never true     Ran Out of Food in the Last Year: Never true   Transportation Needs: No Transportation Needs (2/9/2024)    PRAPARE - Transportation     Lack of Transportation (Medical): No     Lack of Transportation (Non-Medical): No   Utilities: Not At Risk (2/9/2024)    [facility] Utilities     Threatened with loss of utilities: No       DISCHARGE DETAILS:    Discharge planning discussed with:: Patient and spouse  Freedom of Choice: Yes  Comments - Freedom of Choice: Pt/sposue requesting [facility] for [facility]  CM contacted family/caregiver?: Yes  Were Treatment Team discharge recommendations reviewed with patient/caregiver?: Yes  Did patient/caregiver verbalize understanding of patient care needs?: Yes  Were patient/caregiver advised of the risks associated with not following Treatment Team discharge recommendations?: Yes    Contacts  Patient Contacts: darya Cruz  Relationship to Patient:: Family  Contact Method: In Person  Reason/Outcome: Referral, Continuity of Care, Emergency Contact, Discharge Planning    Requested Home Health Care         Is the patient interested in HHC at discharge?: Yes  Home Health Discipline requested:: Occupational Therapy, Physical Therapy  Home Health Agency Name:: [facility]  HHA External Referral Reason (only applicable if external HHA name selected): Patient has established relationship with provider  Home Health Follow-Up Provider:: PCP  Home Health Services Needed:: Gait/ADL Training, Evaluate Functional Status and Safety, Strengthening/Theraputic Exercises to Improve  Function  Homebound Criteria Met:: Uses an Assist Device (i.e. cane, walker, etc), Requires the Assistance of Another Person for Safe Ambulation or to Leave the Home  Supporting Clincal Findings:: Limited Endurance, Fatigues Easliy in Short Distances    DME Referral Provided  Referral made for DME?: No    Other Referral/Resources/Interventions Provided:  Interventions: [facility]  Referral Comments: CM spoke with pt and spouse at bedside, introduced self and role with discharge planning. Pt reported there have been no changes to home layout/support system since last admission. Pt reported preference to return home with [facility]'s A [facility]. CM sent referral via [facility] for [facility]. Pt/spouse aware CM will continue to follow for discahrge coordination.    Would you like to participate in our [facility] service program?  : No - Declined    Treatment Team Recommendation: Home with Home Health Care  Discharge Destination Plan:: Home with Home Health Care  Transport at Discharge : Family

## 2024-02-09 NOTE — PLAN OF CARE
Problem: PAIN - ADULT  Goal: Verbalizes/displays adequate comfort level or baseline comfort level  Description: Interventions:  - Encourage patient to monitor pain and request assistance  - Assess pain using appropriate pain scale  - Administer analgesics based on type and severity of pain and evaluate response  - Implement non-pharmacological measures as appropriate and evaluate response  - Consider cultural and social influences on pain and pain management  - Notify physician/advanced practitioner if interventions unsuccessful or patient reports new pain  Outcome: Progressing     Problem: INFECTION - ADULT  Goal: Absence or prevention of progression during hospitalization  Description: INTERVENTIONS:  - Assess and monitor for signs and symptoms of infection  - Monitor lab/diagnostic results  - Monitor all insertion sites, i.e. indwelling lines, tubes, and drains  - Monitor endotracheal if appropriate and nasal secretions for changes in amount and color  - [place] appropriate cooling/warming therapies per order  - Administer medications as ordered  - Instruct and encourage patient and family to use good hand hygiene technique  - Identify and instruct in appropriate isolation precautions for identified infection/condition  Outcome: Progressing  Goal: Absence of fever/infection during neutropenic period  Description: INTERVENTIONS:  - Monitor WBC    Outcome: Progressing     Problem: DISCHARGE PLANNING  Goal: Discharge to home or other facility with appropriate resources  Description: INTERVENTIONS:  - Identify barriers to discharge w/patient and caregiver  - Arrange for needed discharge resources and transportation as appropriate  - Identify discharge learning needs (meds, wound care, etc.)  - Arrange for interpretive services to assist at discharge as needed  - Refer to Case Management Department for coordinating discharge planning if the patient needs post-hospital services based on physician/advanced  practitioner order or complex needs related to functional status, cognitive ability, or social support system  Outcome: Progressing     Problem: Knowledge Deficit  Goal: Patient/family/caregiver demonstrates understanding of disease process, treatment plan, medications, and discharge instructions  Description: Complete learning assessment and assess knowledge base.  Interventions:  - Provide teaching at level of understanding  - Provide teaching via preferred learning methods  Outcome: Progressing

## 2024-02-09 NOTE — ASSESSMENT & PLAN NOTE
Currently taking Sinemet immediate release 3 tablets 4 times daily.  Patient was seen by movement disorder specialist in the month of December 2023 and increase the dose.  The first-time seizure that she had was in December/2023.  Patient does not recall if the seizure onset is prior to or after the dose adjustment.  She has been feeling restless, agitated, anxious, and having insomnia.  Suspecting increase in dopamine levels.     Plan:  Continue home dosage Sinemet for now or per neurology.

## 2024-02-09 NOTE — PLAN OF CARE
Problem: Prexisting or High Potential for Compromised Skin Integrity  Goal: Skin integrity is maintained or improved  Description: INTERVENTIONS:  - Identify patients at risk for skin breakdown  - Assess and monitor skin integrity  - Assess and monitor nutrition and hydration status  - Monitor labs   - Assess for incontinence   - Turn and reposition patient  - Assist with mobility/ambulation  - Relieve pressure over bony prominences  - Avoid friction and shearing  - Provide appropriate hygiene as needed including keeping skin clean and dry  - Evaluate need for skin moisturizer/barrier cream  - Collaborate with interdisciplinary team   - Patient/family teaching  - Consider wound care consult   Outcome: Progressing     Problem: PAIN - ADULT  Goal: Verbalizes/displays adequate comfort level or baseline comfort level  Description: Interventions:  - Encourage patient to monitor pain and request assistance  - Assess pain using appropriate pain scale  - Administer analgesics based on type and severity of pain and evaluate response  - Implement non-pharmacological measures as appropriate and evaluate response  - Consider cultural and social influences on pain and pain management  - Notify physician/advanced practitioner if interventions unsuccessful or patient reports new pain  Outcome: Progressing     Problem: INFECTION - ADULT  Goal: Absence or prevention of progression during hospitalization  Description: INTERVENTIONS:  - Assess and monitor for signs and symptoms of infection  - Monitor lab/diagnostic results  - Monitor all insertion sites, i.e. indwelling lines, tubes, and drains  - Monitor endotracheal if appropriate and nasal secretions for changes in amount and color  - [place] appropriate cooling/warming therapies per order  - Administer medications as ordered  - Instruct and encourage patient and family to use good hand hygiene technique  - Identify and instruct in appropriate isolation precautions for  identified infection/condition  Outcome: Progressing  Goal: Absence of fever/infection during neutropenic period  Description: INTERVENTIONS:  - Monitor WBC    Outcome: Progressing     Problem: SAFETY ADULT  Goal: Patient will remain free of falls  Description: INTERVENTIONS:  - Educate patient/family on patient safety including physical limitations  - Instruct patient to call for assistance with activity   - Consult OT/PT to assist with strengthening/mobility   - Keep Call bell within reach  - Keep bed low and locked with side rails adjusted as appropriate  - Keep care items and personal belongings within reach  - Initiate and maintain comfort rounds  - Make Fall Risk Sign visible to staff  - Apply yellow socks and bracelet for high fall risk patients  - Consider moving patient to room near nurses station  Outcome: Progressing  Goal: Maintain or return to baseline ADL function  Description: INTERVENTIONS:  -  Assess patient's ability to carry out ADLs; assess patient's baseline for ADL function and identify physical deficits which impact ability to perform ADLs (bathing, care of mouth/teeth, toileting, grooming, dressing, etc.)  - Assess/evaluate cause of self-care deficits   - Assess range of motion  - Assess patient's mobility; develop plan if impaired  - Assess patient's need for assistive devices and provide as appropriate  - Encourage maximum independence but intervene and supervise when necessary  - Involve family in performance of ADLs  - Assess for home care needs following discharge   - Consider OT consult to assist with ADL evaluation and planning for discharge  - Provide patient education as appropriate  Outcome: Progressing  Goal: Maintains/Returns to pre admission functional level  Description: INTERVENTIONS:  - Perform AM-PAC 6 Click Basic Mobility/ Daily Activity assessment daily.  - Set and communicate daily mobility goal to care team and patient/family/caregiver.   - Collaborate with rehabilitation  services on mobility goals if consulted  - Out of bed for toileting  - Record patient progress and toleration of activity level   Outcome: Progressing     Problem: Knowledge Deficit  Goal: Patient/family/caregiver demonstrates understanding of disease process, treatment plan, medications, and discharge instructions  Description: Complete learning assessment and assess knowledge base.  Interventions:  - Provide teaching at level of understanding  - Provide teaching via preferred learning methods  Outcome: Progressing     Problem: DISCHARGE PLANNING  Goal: Discharge to home or other facility with appropriate resources  Description: INTERVENTIONS:  - Identify barriers to discharge w/patient and caregiver  - Arrange for needed discharge resources and transportation as appropriate  - Identify discharge learning needs (meds, wound care, etc.)  - Arrange for interpretive services to assist at discharge as needed  - Refer to Case Management Department for coordinating discharge planning if the patient needs post-hospital services based on physician/advanced practitioner order or complex needs related to functional status, cognitive ability, or social support system  Outcome: Progressing

## 2024-02-09 NOTE — PROGRESS NOTES
[facility]  Progress Note  Name: Kamilla Pat I  MRN: [id number]  Unit/Bed#: S -01 I Date of Admission: 2/8/2024   Date of Service: 2/9/2024 I Hospital Day: 1    Assessment/Plan   * Seizure-like activity (HCC)  Assessment & Plan  Patient reports having seizure-like activity for the past 3 nights  Seizure-like activity tonight lasted for around 15 minutes  Patient family reports patient was found to have generalized shaking and arm jerking and then full body shaking with loss of consciousness  No urinary or bowel incontinence reported, tongue bite present on the left side  Patient was recently admitted on January 12, due to PE and was found to have seizure in the hospital  Patient was started on Keppra 750 Mg twice daily  MRI brain without contrast on 1/14/2024 with no acute infarction, intracranial hemorrhage or mass effect.  1/14: EEG: Abnormal with Generalized irregular delta activity   Lactic acid: 11.8 - 1.6   B12 low  Patient was readmitted on the same-day of discharge. Recurrent similar seizure episode on the way home. Neuro- oncall recommended xferring to [facility] for vEEG.    Workups:  CT head: Stable findings without acute intracranial abnormality.   MRI w/ con: Stable MRI of the brain with no acute intracranial abnormality. 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  EEG: This Routine EEG recorded during wakefulness, drowsiness, and sleep is abnormal. Background activities and posteriorly dominant rhythm are mildly too slow, suggesting mild diffuse cerebral dysfunction of nonspecific etiology.     PLAN:  Neurology consult.  Continue Keppra 1000 mg twice daily.  Video EEG- pending transfer to [facility].  Ativan 1 mg IV as needed for persistent seizure activity over 3 minutes.  Seizure precaution reviewed with patient  B12 IM.    CKD (chronic kidney disease) stage 2, GFR 60-89 ml/min  Assessment & Plan  Lab Results   Component Value Date     EGFR 84 02/08/2024    EGFR 64 02/08/2024    EGFR 87 02/08/2024    CREATININE 0.69 02/08/2024    CREATININE 0.87 02/08/2024    CREATININE 0.62 02/08/2024     Plan:  Avoid nephrotoxins  Avoid hypotension.    History of pulmonary embolism  Assessment & Plan  Diagnosed with PE on 1/11/2024.  Currently on Eliquis 5 mg twice a day.  As per her previous discharge summary she has to be on Eliquis for total 6 months.    Plan:  Continue Eliquis 5 mg twice daily    Anxiety  Assessment & Plan  Patient has history of anxiety.  Thinks that anxiety is provoking seizures.  Currently not on any medications.  Pulse: 83    Plan:  Consider Atarax.      Parkinson disease  Assessment & Plan  Currently taking Sinemet immediate release 3 tablets 4 times daily.  Patient was seen by movement disorder specialist in the month of December 2023 and increase the dose.  The first-time seizure that she had was in December/2023.  Patient does not recall if the seizure onset is prior to or after the dose adjustment.  She has been feeling restless, agitated, anxious, and having insomnia.  Suspecting increase in dopamine levels.     Plan:  Continue home dosage Sinemet for now or per neurology.    Elevated WBC count-resolved as of 2/9/2024  Assessment & Plan  on the day of discharge patient has elevated WBC 10.98.  Recent Labs     02/08/24  0450 02/08/24  1535 02/09/24  0630   WBC 5.12 10.98* 4.21*     It is afebrile, no clear source of infection at this moment.    Plan:  CBC in the a.m.           VTE Pharmacologic Prophylaxis: VTE Score: 4 Moderate Risk (Score 3-4) - Pharmacological DVT Prophylaxis Ordered: apixaban (Eliquis).    Mobility:   Basic Mobility Inpatient Raw Score: 17  -HLM Goal: 5: Stand one or more mins  JH-HLM Achieved: 1: Laying in bed  HLM Goal NOT achieved. Continue with multidisciplinary rounding and encourage appropriate mobility to improve upon HLM goals.    Patient Centered Rounds: I performed bedside rounds with nursing staff  today.  Discussions with Specialists or Other Care Team Provider: Neuro    Education and Discussions with Family / Patient: Attempted to update  () via phone. Left voicemail.     Current Length of Stay: 1 day(s)  Current Patient Status: Inpatient   Discharge Plan:  pending [facility] xfer.    Code Status: Level 1 - Full Code    Subjective:   Patient was seen and examined at bedside.  More anxious but no distress.  Did not sleep well overnight.  Patient was discharged earlier yesterday but on the way back home, she did have another episode of similar seizure activity.  Neurology oncall was consulted, recommending transfer to [facility] for video EEG.  Patient otherwise denying any chest pain, SOB, palpitation.  Occasional resting tremor.    Objective:     Vitals:   Temp (24hrs), Av.2 °F (36.8 °C), Min:97.5 °F (36.4 °C), Max:98.9 °F (37.2 °C)    Temp:  [97.5 °F (36.4 °C)-98.9 °F (37.2 °C)] 98 °F (36.7 °C)  HR:  [] 83  Resp:  [16-18] 16  BP: (125-156)/(62-82) 156/79  SpO2:  [91 %-98 %] 92 %  There is no height or weight on file to calculate BMI.     Input and Output Summary (last 24 hours):   No intake or output data in the 24 hours ending 24 1219    Physical Exam:   Physical Exam  Vitals and nursing note reviewed.   Constitutional:       General: She is not in acute distress.     Appearance: Normal appearance.   HENT:      Nose: Nose normal.      Mouth/Throat:      Pharynx: Oropharynx is clear.      Comments: Tongue bite present on the left side  Eyes:      General:         Right eye: No discharge.         Left eye: No discharge.      Extraocular Movements: Extraocular movements intact.      Conjunctiva/sclera: Conjunctivae normal.   Cardiovascular:      Rate and Rhythm: Normal rate and regular rhythm.      Pulses: Normal pulses.   Pulmonary:      Effort: Pulmonary effort is normal. No respiratory distress.      Breath sounds: Normal breath sounds. No wheezing, rhonchi or rales.   Abdominal:       General: Abdomen is flat. Bowel sounds are normal.      Palpations: Abdomen is soft.      Tenderness: There is no abdominal tenderness. There is no guarding or rebound.   Musculoskeletal:         General: Normal range of motion.      Cervical back: Normal range of motion.      Right lower leg: No edema.      Left lower leg: No edema.   Skin:     General: Skin is warm.      Capillary Refill: Capillary refill takes less than 2 seconds.   Neurological:      Mental Status: She is alert and oriented to person, place, and time. Mental status is at baseline.   Psychiatric:      Comments: Anxious          Additional Data:     Labs:  Results from last 7 days   Lab Units 02/09/24  0630   WBC Thousand/uL 4.21*   HEMOGLOBIN g/dL 10.2*   HEMATOCRIT % 31.5*   PLATELETS Thousands/uL 176   NEUTROS PCT % 72   LYMPHS PCT % 13*   MONOS PCT % 11   EOS PCT % 2     Results from last 7 days   Lab Units 02/08/24  1842 02/08/24  1535   SODIUM mmol/L 135 136   POTASSIUM mmol/L 3.8 3.4*   CHLORIDE mmol/L 104 101   CO2 mmol/L 22 10*   BUN mg/dL 8 8   CREATININE mg/dL 0.69 0.87   ANION GAP mmol/L 9 25   CALCIUM mg/dL 9.0 9.6   ALBUMIN g/dL  --  4.4   TOTAL BILIRUBIN mg/dL  --  1.18*   ALK PHOS U/L  --  87   ALT U/L  --  <3*   AST U/L  --  6*   GLUCOSE RANDOM mg/dL 97 135     Results from last 7 days   Lab Units 02/08/24  1535   INR  1.26*     Results from last 7 days   Lab Units 02/06/24  2034   POC GLUCOSE mg/dl 132     Results from last 7 days   Lab Units 02/08/24  0450   HEMOGLOBIN A1C % 5.2     Results from last 7 days   Lab Units 02/06/24  2340 02/06/24  2107   LACTIC ACID mmol/L 1.6 11.8*       Lines/Drains:  Invasive Devices       Peripheral Intravenous Line  Duration             Peripheral IV 02/08/24 Left;Proximal;Ventral (anterior) Forearm <1 day                          Imaging: No pertinent imaging reviewed.    Recent Cultures (last 7 days):         Last 24 Hours Medication List:   Current Facility-Administered Medications    Medication Dose Route Frequency Provider Last Rate    apixaban  5 mg Oral BID Lawanda Mesa MD      carbidopa-levodopa  3 tablet Oral 4x Daily Lawanda Mesa MD      cholecalciferol  1,000 Units Oral Daily Lawanda Mesa MD      cyanocobalamin  1,000 mcg Intramuscular Weekly Keaton Richards DO      Followed by    [START ON 3/8/2024] cyanocobalamin  1,000 mcg Intramuscular Q30 Days Keaton Richards DO      ezetimibe  10 mg Oral HS Lawanda Mesa MD      levETIRAcetam  1,000 mg Oral Q12H CESAR Lawanda Mesa MD      LORazepam  1 mg Intravenous Q6H PRN Lawanda Mesa MD      potassium chloride  20 mEq Oral Daily Evans Alvarado MD      triamterene-hydrochlorothiazide  1 tablet Oral Daily Lawanda Meas MD          Today, Patient Was Seen By: Ruperto Sexton MD    **Please Note: This note may have been constructed using a voice recognition system.**

## 2024-02-10 LAB
ANION GAP SERPL CALCULATED.3IONS-SCNC: 11 MMOL/L
BUN SERPL-MCNC: 5 MG/DL (ref 5–25)
CALCIUM SERPL-MCNC: 8.7 MG/DL (ref 8.4–10.2)
CHLORIDE SERPL-SCNC: 97 MMOL/L (ref 96–108)
CO2 SERPL-SCNC: 22 MMOL/L (ref 21–32)
CREAT SERPL-MCNC: 0.66 MG/DL (ref 0.6–1.3)
ERYTHROCYTE [DISTWIDTH] IN BLOOD BY AUTOMATED COUNT: 16.5 % (ref 11.6–15.1)
GFR SERPL CREATININE-BSD FRML MDRD: 86 ML/MIN/1.73SQ M
GLUCOSE SERPL-MCNC: 92 MG/DL (ref 65–140)
HCT VFR BLD AUTO: 31.9 % (ref 34.8–46.1)
HGB BLD-MCNC: 10.6 G/DL (ref 11.5–15.4)
MCH RBC QN AUTO: 28.3 PG (ref 26.8–34.3)
MCHC RBC AUTO-ENTMCNC: 33.2 G/DL (ref 31.4–37.4)
MCV RBC AUTO: 85 FL (ref 82–98)
PLATELET # BLD AUTO: 182 THOUSANDS/UL (ref 149–390)
PMV BLD AUTO: 11.1 FL (ref 8.9–12.7)
POTASSIUM SERPL-SCNC: 3.4 MMOL/L (ref 3.5–5.3)
RBC # BLD AUTO: 3.74 MILLION/UL (ref 3.81–5.12)
SODIUM SERPL-SCNC: 130 MMOL/L (ref 135–147)
WBC # BLD AUTO: 5.76 THOUSAND/UL (ref 4.31–10.16)

## 2024-02-10 PROCEDURE — 80048 BASIC METABOLIC PNL TOTAL CA: CPT

## 2024-02-10 PROCEDURE — 85027 COMPLETE CBC AUTOMATED: CPT

## 2024-02-10 PROCEDURE — 99232 SBSQ HOSP IP/OBS MODERATE 35: CPT | Performed by: INTERNAL MEDICINE

## 2024-02-10 RX ORDER — LORAZEPAM 2 MG/ML
1 INJECTION INTRAMUSCULAR ONCE
Status: COMPLETED | OUTPATIENT
Start: 2024-02-10 | End: 2024-02-10

## 2024-02-10 RX ORDER — LEVETIRACETAM 500 MG/1
1500 TABLET ORAL EVERY 12 HOURS SCHEDULED
Status: DISCONTINUED | OUTPATIENT
Start: 2024-02-10 | End: 2024-02-11 | Stop reason: HOSPADM

## 2024-02-10 RX ORDER — DIVALPROEX SODIUM 500 MG/1
500 TABLET, EXTENDED RELEASE ORAL EVERY 12 HOURS
Status: DISCONTINUED | OUTPATIENT
Start: 2024-02-10 | End: 2024-02-11 | Stop reason: HOSPADM

## 2024-02-10 RX ORDER — ONDANSETRON 2 MG/ML
4 INJECTION INTRAMUSCULAR; INTRAVENOUS ONCE
Status: COMPLETED | OUTPATIENT
Start: 2024-02-10 | End: 2024-02-10

## 2024-02-10 RX ADMIN — TRIAMTERENE AND HYDROCHLOROTHIAZIDE 1 TABLET: 37.5; 25 TABLET ORAL at 10:35

## 2024-02-10 RX ADMIN — EZETIMIBE 10 MG: 10 TABLET ORAL at 21:26

## 2024-02-10 RX ADMIN — LEVETIRACETAM 1000 MG: 500 TABLET, FILM COATED ORAL at 10:35

## 2024-02-10 RX ADMIN — ONDANSETRON 4 MG: 2 INJECTION INTRAMUSCULAR; INTRAVENOUS at 05:20

## 2024-02-10 RX ADMIN — POTASSIUM CHLORIDE 20 MEQ: 1500 TABLET, EXTENDED RELEASE ORAL at 10:35

## 2024-02-10 RX ADMIN — CARBIDOPA AND LEVODOPA 3 TABLET: 25; 100 TABLET ORAL at 18:19

## 2024-02-10 RX ADMIN — LORAZEPAM 1 MG: 2 INJECTION INTRAMUSCULAR; INTRAVENOUS at 12:07

## 2024-02-10 RX ADMIN — APIXABAN 5 MG: 5 TABLET, FILM COATED ORAL at 18:19

## 2024-02-10 RX ADMIN — CARBIDOPA AND LEVODOPA 3 TABLET: 25; 100 TABLET ORAL at 21:25

## 2024-02-10 RX ADMIN — Medication 1000 UNITS: at 10:35

## 2024-02-10 RX ADMIN — CARBIDOPA AND LEVODOPA 3 TABLET: 25; 100 TABLET ORAL at 10:35

## 2024-02-10 RX ADMIN — VALPROATE SODIUM 1516 MG: 100 INJECTION, SOLUTION INTRAVENOUS at 19:57

## 2024-02-10 RX ADMIN — APIXABAN 5 MG: 5 TABLET, FILM COATED ORAL at 10:35

## 2024-02-10 RX ADMIN — DIVALPROEX SODIUM 500 MG: 500 TABLET, FILM COATED, EXTENDED RELEASE ORAL at 21:25

## 2024-02-10 RX ADMIN — CARBIDOPA AND LEVODOPA 3 TABLET: 25; 100 TABLET ORAL at 13:58

## 2024-02-10 RX ADMIN — LEVETIRACETAM 1500 MG: 500 TABLET, FILM COATED ORAL at 21:25

## 2024-02-10 NOTE — QUICK NOTE
The primary team reached out to neurology with report that patient had a 2-minute episode of seizure activity that self resolved.  Upon talking to patient with patient's  at bedside, the patient reported remembering shaking, however was quite fatigued during interview.  The patient's  reported that this event last did 2 minutes, and reported it looking the same as the patient's previous events.  Patient was alert, and oriented to person, place, time, and event.  Patient's neurologic examination was grossly intact without focal neurologic deficits.    This provider reached out to Network PAC transfer RN role on [employer] with request on update regarding bed status and report was given that [facility] is currently at capacity, no timeline for transfer currently.    - Lorazepam 1 mg IV ordered x 1  - Levetiracetam increased to 1500 mg every 12 hours

## 2024-02-10 NOTE — ASSESSMENT & PLAN NOTE
Patient has history of anxiety.  Thinks that anxiety is provoking seizures.  Currently not on any medications.  Pulse: 90    Plan:  Consider Atarax.

## 2024-02-10 NOTE — ASSESSMENT & PLAN NOTE
Lab Results   Component Value Date    EGFR 86 02/10/2024    EGFR 84 02/08/2024    EGFR 64 02/08/2024    CREATININE 0.66 02/10/2024    CREATININE 0.69 02/08/2024    CREATININE 0.87 02/08/2024     Creatinine at baseline.  Avoid nephrotoxins and hypotension.

## 2024-02-10 NOTE — ASSESSMENT & PLAN NOTE
Currently taking Sinemet immediate release 3 tablets 4 times daily.  Patient was seen by movement disorder specialist in the month of December 2023 and increase the dose.  The first-time seizure that she had was in December/2023.  Patient does not recall if the seizure onset is prior to or after the dose adjustment.  She has been feeling restless, agitated, anxious, and having insomnia.  Suspecting increase in dopamine levels.      Plan:  Continue home dosage Sinemet

## 2024-02-10 NOTE — H&P
[facility]  H&P  Name: Kamilla Pat 78 y.o. female I MRN: [id number]  Unit/Bed#: [facility]PHP 717-01 I Date of Admission: 2/11/2024   Date of Service: 2/11/2024 I Hospital Day: 0      Assessment/Plan   * Seizure-like activity (HCC)  Assessment & Plan  Patient was recently admitted from 2/6 to 2/8 for seizure-like activity as reported generalized shaking and jerking with loss of consciousness per family.    MRI brain on that admission was unremarkable.  Patient evaluated by neurology and was discharged on Keppra however and route to home she had another episode hence presented again to [facility].  Keppra was increased from 750 to 1000 mg twice daily.  Case was discussed with neurology and recommended transfer to [facility] for video EEG.  Continue Ativan as needed.  Started on seizure prophylaxis.  Neurology consulted, case discussed with neurology, will get repeat valproic acid level in a.m.    CKD (chronic kidney disease) stage 2, GFR 60-89 ml/min  Assessment & Plan  Lab Results   Component Value Date    EGFR 86 02/10/2024    EGFR 84 02/08/2024    EGFR 64 02/08/2024    CREATININE 0.66 02/10/2024    CREATININE 0.69 02/08/2024    CREATININE 0.87 02/08/2024     Creatinine at baseline.  Avoid nephrotoxins and hypotension.    History of pulmonary embolism  Assessment & Plan  Diagnosed with PE on 1/11/2024.  Currently on Eliquis 5 mg twice a day.  As per her previous discharge summary she has to be on Eliquis for total 6 months.  Continue Eliquis 5 mg twice daily    Pure hypercholesterolemia  Assessment & Plan  Continue Zetia at home dosage.    Parkinson disease  Assessment & Plan  Currently taking Sinemet immediate release 3 tablets 4 times daily.  Patient was seen by movement disorder specialist in the month of December 2023 and increase the dose.  The first-time seizure that she had was in December/2023.  Patient does not recall if the seizure onset is prior to or after  the dose adjustment.  She has been feeling restless, agitated, anxious, and having insomnia.  Suspecting increase in dopamine levels.      Plan:  Continue home dosage Sinemet         VTE Pharmacologic Prophylaxis:   Moderate Risk (Score 3-4) - Pharmacological DVT Prophylaxis Ordered: apixaban (Eliquis).  Code Status: Level 1 - Full Code       Anticipated Length of Stay: Patient will be admitted on an inpatient basis with an anticipated length of stay of greater than 2 midnights secondary to vEEG.    Total Time Spent on Date of Encounter in care of patient: 40 mins. This time was spent on one or more of the following: performing physical exam; counseling and coordination of care; obtaining or reviewing history; documenting in the medical record; reviewing/ordering tests, medications or procedures; communicating with other healthcare professionals and discussing with patient's family/caregivers.    Chief Complaint: seizure    History of Present Illness:  Kamilla Pat is a 78 y.o. female with a PMH of Parkinson's disease, osteopenia, pulmonary embolism and seizures who presents with seizure-like activity for 15 minutes.  She was recently admitted to [facility] from 2/6 to 2/8 when she reported having seizure-like activity for the past 3 nights however this one lasted for over 15 minutes.  Patient family reports patient having tonic-clonic movements starting from her mouth to her arm and then the whole body.  MRI brain unremarkable and was discharged on Keppra.  En route to home she was again noted to have seizure-like activity and re admitted to [facility] on 2/8.  Case was discussed with neurology recommended transfer to [facility] for video EEG.    Per chart review patient also had seizure-like activity on 2/10 with urinary incontinence reported.  Patient is not able to recall events or episodes but does report feeling tired.      Review of Systems:  Review of Systems   All other systems reviewed and are  negative.      Past Medical and Surgical History:   Past Medical History:   Diagnosis Date    Anxiety     Hepatitis C 04/01/2017    screening Negative    High cholesterol     Hip pain     History of low potassium     Hyponatremia     Lower extremity edema     Obesity     Osteopenia     Overweight     Thigh pain        Past Surgical History:   Procedure Laterality Date    BLADDER SURGERY      CHOLECYSTECTOMY      COLOGUARD (HISTORICAL)  07/02/2018    COLONOSCOPY  01/01/2007    DXA PROCEDURE (HISTORICAL)  03/27/2014    MAMMO (HISTORICAL)  08/31/2016    REDUCTION MAMMAPLASTY      REPLACEMENT TOTAL KNEE Bilateral     TOTAL HIP ARTHROPLASTY Left 01/01/2015    TOTAL KNEE ARTHROPLASTY Left        Meds/Allergies:  Prior to Admission medications    Medication Sig Start Date End Date Taking? Authorizing Provider   acetaminophen (TYLENOL) 325 mg tablet Take 325 mg by mouth every 6 (six) hours as needed for mild pain    Historical Provider, MD   apixaban (Eliquis) 5 mg Take 2 tablets (10 mg total) by mouth 2 (two) times a day for 7 days, THEN 1 tablet (5 mg total) 2 (two) times a day for 21 days. 1/12/24 2/9/24  Jonathan Jenkins MD   Calcium Carbonate-Vitamin D (CALCIUM-CARB 600 + D PO) Take 1 tablet by mouth daily    Historical Provider, MD   carbidopa-levodopa (SINEMET)  mg per tablet Take 3 tablets by mouth 4 (four) times a day Please continue to take Sinemet as per your neurologist's instruction. 2/8/24 3/9/24  Ruperto Sexton MD   Cholecalciferol 50 MCG (2000 UT) CAPS Take 1 capsule by mouth daily    Historical Provider, MD   Diclofenac Sodium (VOLTAREN) 1 % Apply 2 g topically 4 (four) times a day 8/15/23   [facility] Cindi Christianson MD   ezetimibe (ZETIA) 10 mg tablet Take by mouth Daily 4/1/19   Historical Provider, MD   levETIRAcetam (KEPPRA) 1000 MG tablet Take 1 tablet (1,000 mg total) by mouth every 12 (twelve) hours 2/8/24 3/9/24  Ruperto Sexton MD   potassium chloride (MICRO-K) 10 MEQ CR capsule Take 20 mEq by mouth Daily Per  [facility] note from Nahomi Sandoval, DO - 12/19/2023 10:10 AM EST 20 meq.  4/1/19   Historical Provider, MD   triamterene-hydrochlorothiazide (DYAZIDE) 37.5-25 mg per capsule Take 1 capsule by mouth Daily 4/1/19   Historical Provider, MD     I have reviewed home medications using recent Epic encounter.    Allergies:   Allergies   Allergen Reactions    Hydrocodone-Acetaminophen GI Intolerance    Hydromorphone GI Intolerance    Oxycodone-Acetaminophen GI Intolerance    Statins Myalgia       Social History:  Marital Status: /Civil Union   Occupation: [employer]  Patient Pre-hospital Living Situation: Home  Patient Pre-hospital Level of Mobility: walks  Patient Pre-hospital Diet Restrictions: None  Substance Use History:   Social History     Substance and Sexual Activity   Alcohol Use Yes    Comment: social, Occasional      Social History     Tobacco Use   Smoking Status Never   Smokeless Tobacco Never     Social History     Substance and Sexual Activity   Drug Use Never       Family History:  Family History   Problem Relation Age of Onset    Glaucoma Mother     Hypertension Mother     Glaucoma Father     Leukemia Father     Hypertension Father        Physical Exam:     Vitals:   Blood Pressure: 96/61 (02/11/24 1625)  Temperature: 97.7 °F (36.5 °C) (02/11/24 1625)    Physical Exam  Constitutional:       Appearance: Normal appearance.   HENT:      Head: Normocephalic and atraumatic.      Mouth/Throat:      Pharynx: Oropharynx is clear.   Eyes:      Extraocular Movements: Extraocular movements intact.      Conjunctiva/sclera: Conjunctivae normal.   Cardiovascular:      Rate and Rhythm: Normal rate and regular rhythm.      Pulses: Normal pulses.      Heart sounds: Normal heart sounds.   Pulmonary:      Effort: Pulmonary effort is normal.      Breath sounds: Normal breath sounds.   Skin:     General: Skin is warm and dry.   Neurological:      Mental Status: She is alert and oriented to person, place, and  time.          Additional Data:     Lab Results:  Results from last 7 days   Lab Units 02/10/24  0516 02/09/24  0630   WBC Thousand/uL 5.76 4.21*   HEMOGLOBIN g/dL 10.6* 10.2*   HEMATOCRIT % 31.9* 31.5*   PLATELETS Thousands/uL 182 176   NEUTROS PCT %  --  72   LYMPHS PCT %  --  13*   MONOS PCT %  --  11   EOS PCT %  --  2     Results from last 7 days   Lab Units 02/11/24  0300 02/08/24  1842 02/08/24  1535   SODIUM mmol/L 131*   < > 136   POTASSIUM mmol/L 3.3*   < > 3.4*   CHLORIDE mmol/L 94*   < > 101   CO2 mmol/L 25   < > 10*   BUN mg/dL 9   < > 8   CREATININE mg/dL 0.78   < > 0.87   ANION GAP mmol/L 12   < > 25   CALCIUM mg/dL 9.2   < > 9.6   ALBUMIN g/dL  --   --  4.4   TOTAL BILIRUBIN mg/dL  --   --  1.18*   ALK PHOS U/L  --   --  87   ALT U/L  --   --  <3*   AST U/L  --   --  6*   GLUCOSE RANDOM mg/dL 92   < > 135    < > = values in this interval not displayed.     Results from last 7 days   Lab Units 02/08/24  1535   INR  1.26*     Results from last 7 days   Lab Units 02/06/24  2034   POC GLUCOSE mg/dl 132     Results from last 7 days   Lab Units 02/08/24  0450   HEMOGLOBIN A1C % 5.2     Results from last 7 days   Lab Units 02/06/24  2340 02/06/24  2107   LACTIC ACID mmol/L 1.6 11.8*       Lines/Drains:  Invasive Devices       Peripheral Intravenous Line  Duration             Peripheral IV 02/08/24 Left;Proximal;Ventral (anterior) Forearm 3 days                        Imaging: No pertinent imaging reviewed.  No orders to display       EKG and Other Studies Reviewed on Admission:   EKG: No EKG obtained.    ** Please Note: This note has been constructed using a voice recognition system. **

## 2024-02-10 NOTE — PROGRESS NOTES
[facility]  Progress Note  Name: Kamilla Pat I  MRN: [id number]  Unit/Bed#: S -01 I Date of Admission: 2/8/2024   Date of Service: 2/10/2024 I Hospital Day: 2    Assessment/Plan   * Seizure-like activity (HCC)  Assessment & Plan  Patient reports having seizure-like activity for the past 3 nights  Seizure-like activity tonight lasted for around 15 minutes  Patient family reports patient was found to have generalized shaking and arm jerking and then full body shaking with loss of consciousness  No urinary or bowel incontinence reported, tongue bite present on the left side  Patient was recently admitted on January 12, due to PE and was found to have seizure in the hospital  Patient was started on Keppra 750 Mg twice daily  MRI brain without contrast on 1/14/2024 with no acute infarction, intracranial hemorrhage or mass effect.  1/14: EEG: Abnormal with Generalized irregular delta activity   Lactic acid: 11.8 - 1.6   B12 low  Patient was readmitted on the same-day of discharge. Recurrent similar seizure episode on the way home. Neuro- oncall recommended xferring to [facility] for vEEG.    Workups:  CT head: Stable findings without acute intracranial abnormality.   MRI w/ con: Stable MRI of the brain with no acute intracranial abnormality. 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  EEG: This Routine EEG recorded during wakefulness, drowsiness, and sleep is abnormal. Background activities and posteriorly dominant rhythm are mildly too slow, suggesting mild diffuse cerebral dysfunction of nonspecific etiology.     PLAN:  Neurology consult.  Continue Keppra 1000 mg twice daily.  Video EEG- pending transfer to [facility].  Ativan 1 mg IV as needed for persistent seizure activity over 3 minutes.  Seizure precaution reviewed with patient  B12 IM.    CKD (chronic kidney disease) stage 2, GFR 60-89 ml/min  Assessment & Plan  Lab Results   Component Value Date     EGFR 86 02/10/2024    EGFR 84 02/08/2024    EGFR 64 02/08/2024    CREATININE 0.66 02/10/2024    CREATININE 0.69 02/08/2024    CREATININE 0.87 02/08/2024   At baseline  Plan:  Avoid nephrotoxins  Avoid hypotension.    History of pulmonary embolism  Assessment & Plan  Diagnosed with PE on 1/11/2024.  Currently on Eliquis 5 mg twice a day.  As per her previous discharge summary she has to be on Eliquis for total 6 months.    Plan:  Continue Eliquis 5 mg twice daily    Anxiety  Assessment & Plan  Patient has history of anxiety.  Thinks that anxiety is provoking seizures.  Currently not on any medications.  Pulse: 90    Plan:  Consider Atarax.      Parkinson disease  Assessment & Plan  Currently taking Sinemet immediate release 3 tablets 4 times daily.  Patient was seen by movement disorder specialist in the month of December 2023 and increase the dose.  The first-time seizure that she had was in December/2023.  Patient does not recall if the seizure onset is prior to or after the dose adjustment.  She has been feeling restless, agitated, anxious, and having insomnia.  Suspecting increase in dopamine levels.     Plan:  Continue home dosage Sinemet for now or per neurology.    Elevated WBC count-resolved as of 2/9/2024  Assessment & Plan  on the day of discharge patient has elevated WBC 10.98.  Recent Labs     02/08/24  1535 02/09/24  0630 02/10/24  0516   WBC 10.98* 4.21* 5.76       It is afebrile, no clear source of infection at this moment.    Plan:  CBC in the a.m.         r  VTE Pharmacologic Prophylaxis: VTE Score: 4 Moderate Risk (Score 3-4) - Pharmacological DVT Prophylaxis Ordered: apixaban (Eliquis).    Mobility:   Basic Mobility Inpatient Raw Score: 17  JH-HLM Goal: 5: Stand one or more mins  JH-HLM Achieved: 5: Stand (1 or more minutes)  HLM Goal achieved. Continue to encourage appropriate mobility.    Patient Centered Rounds: I performed bedside rounds with nursing staff today.  Discussions with  Specialists or Other Care Team Provider: Neurology    Education and Discussions with Family / Patient: Updated  () at bedside.    Current Length of Stay: 2 day(s)  Current Patient Status: Inpatient   Discharge Plan:  pending availability at hospitals for transfer    Code Status: Level 1 - Full Code    Subjective:   Patient seen and examined at bedside no events however a few minutes after my exam patient had a generalized tonic-clonic seizure.  No loss of consciousness.  Patient reports having auras before episodes characterized by gagging and nausea.  No focal neurologic deficits post the episode,patient is back to baseline.    Objective:     Vitals:   Temp (24hrs), Av.2 °F (36.8 °C), Min:98 °F (36.7 °C), Max:98.3 °F (36.8 °C)    Temp:  [98 °F (36.7 °C)-98.3 °F (36.8 °C)] 98.3 °F (36.8 °C)  HR:  [77-90] 90  Resp:  [16-18] 16  BP: (133-156)/(68-93) 147/93  SpO2:  [92 %-95 %] 94 %  There is no height or weight on file to calculate BMI.     Input and Output Summary (last 24 hours):   No intake or output data in the 24 hours ending 02/10/24 0610    Physical Exam:   Physical Exam     Additional Data:     Labs:  Results from last 7 days   Lab Units 02/10/24  0516 24  0630   WBC Thousand/uL 5.76 4.21*   HEMOGLOBIN g/dL 10.6* 10.2*   HEMATOCRIT % 31.9* 31.5*   PLATELETS Thousands/uL 182 176   NEUTROS PCT %  --  72   LYMPHS PCT %  --  13*   MONOS PCT %  --  11   EOS PCT %  --  2     Results from last 7 days   Lab Units 02/10/24  0516 24  1842 24  1535   SODIUM mmol/L 130*   < > 136   POTASSIUM mmol/L 3.4*   < > 3.4*   CHLORIDE mmol/L 97   < > 101   CO2 mmol/L 22   < > 10*   BUN mg/dL 5   < > 8   CREATININE mg/dL 0.66   < > 0.87   ANION GAP mmol/L 11   < > 25   CALCIUM mg/dL 8.7   < > 9.6   ALBUMIN g/dL  --   --  4.4   TOTAL BILIRUBIN mg/dL  --   --  1.18*   ALK PHOS U/L  --   --  87   ALT U/L  --   --  <3*   AST U/L  --   --  6*   GLUCOSE RANDOM mg/dL 92   < > 135    < > = values in this  interval not displayed.     Results from last 7 days   Lab Units 02/08/24  1535   INR  1.26*     Results from last 7 days   Lab Units 02/06/24  2034   POC GLUCOSE mg/dl 132     Results from last 7 days   Lab Units 02/08/24  0450   HEMOGLOBIN A1C % 5.2     Results from last 7 days   Lab Units 02/06/24  2340 02/06/24  2107   LACTIC ACID mmol/L 1.6 11.8*       Lines/Drains:  Invasive Devices       Peripheral Intravenous Line  Duration             Peripheral IV 02/08/24 Left;Proximal;Ventral (anterior) Forearm 1 day                          Imaging: No pertinent imaging reviewed.    Recent Cultures (last 7 days):         Last 24 Hours Medication List:   Current Facility-Administered Medications   Medication Dose Route Frequency Provider Last Rate    apixaban  5 mg Oral BID Lawanda Mesa MD      carbidopa-levodopa  3 tablet Oral 4x Daily Lawanda Mesa MD      cholecalciferol  1,000 Units Oral Daily Lawanda Mesa MD      cyanocobalamin  1,000 mcg Intramuscular Weekly Keaton Richards DO      Followed by    [START ON 3/8/2024] cyanocobalamin  1,000 mcg Intramuscular Q30 Days Keaton Richards DO      ezetimibe  10 mg Oral HS Lawanda Mesa MD      levETIRAcetam  1,000 mg Oral Q12H CESAR Lawanda Mesa MD      LORazepam  1 mg Intravenous Q6H PRN Lawanda Mesa MD      potassium chloride  20 mEq Oral Daily Evans Avlarado MD      triamterene-hydrochlorothiazide  1 tablet Oral Daily Lawanda Mesa MD          Today, Patient Was Seen By: Jayne Solitario MD    **Please Note: This note may have been constructed using a voice recognition system.**

## 2024-02-10 NOTE — ASSESSMENT & PLAN NOTE
Lab Results   Component Value Date    EGFR 86 02/10/2024    EGFR 84 02/08/2024    EGFR 64 02/08/2024    CREATININE 0.66 02/10/2024    CREATININE 0.69 02/08/2024    CREATININE 0.87 02/08/2024   At baseline  Plan:  Avoid nephrotoxins  Avoid hypotension.

## 2024-02-10 NOTE — ASSESSMENT & PLAN NOTE
Patient was recently admitted from 2/6 to 2/8 for seizure-like activity as reported generalized shaking and jerking with loss of consciousness per family.    MRI brain on that admission was unremarkable.  Patient evaluated by neurology and was discharged on Keppra however and route to home she had another episode hence presented again to [facility].  Keppra was increased from 750 to 1000 mg twice daily.  Case was discussed with neurology and recommended transfer to [facility] for video EEG.  Continue Ativan as needed.  Started on seizure prophylaxis.  Neurology consulted, case discussed with neurology, will get repeat valproic acid level in a.m.

## 2024-02-10 NOTE — ASSESSMENT & PLAN NOTE
on the day of discharge patient has elevated WBC 10.98.  Recent Labs     02/08/24  1535 02/09/24  0630 02/10/24  0516   WBC 10.98* 4.21* 5.76       It is afebrile, no clear source of infection at this moment.    Plan:  CBC in the a.m.

## 2024-02-10 NOTE — NURSING NOTE
I was called into pt room by  who stated that his wife is having an episode of shaking. Patient was observed with left hand and right leg tremors that were increasing in intensity and spread to whole body. Shaking lasted 2 minutes. Patient had an episode of urinary incontinence. All  activity lasted about 4-5 minutes. Tremors were followed by fatigue and sleep.  I stayed with patient all this time and called provider into the room after. Will continue to monitor patient.

## 2024-02-10 NOTE — ASSESSMENT & PLAN NOTE
Diagnosed with PE on 1/11/2024.  Currently on Eliquis 5 mg twice a day.  As per her previous discharge summary she has to be on Eliquis for total 6 months.  Continue Eliquis 5 mg twice daily

## 2024-02-10 NOTE — ASSESSMENT & PLAN NOTE
Patient reports having seizure-like activity for the past 3 nights  Seizure-like activity tonight lasted for around 15 minutes  Patient family reports patient was found to have generalized shaking and arm jerking and then full body shaking with loss of consciousness  No urinary or bowel incontinence reported, tongue bite present on the left side  Patient was recently admitted on January 12, due to PE and was found to have seizure in the hospital  Patient was started on Keppra 750 Mg twice daily  MRI brain without contrast on 1/14/2024 with no acute infarction, intracranial hemorrhage or mass effect.  1/14: EEG: Abnormal with Generalized irregular delta activity   Lactic acid: 11.8 - 1.6   B12 low  Patient was readmitted on the same-day of discharge. Recurrent similar seizure episode on the way home. Neuro- oncall recommended xferring to [facility] for vEEG.    Workups:  CT head: Stable findings without acute intracranial abnormality.   MRI w/ con: Stable MRI of the brain with no acute intracranial abnormality. 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  EEG: This Routine EEG recorded during wakefulness, drowsiness, and sleep is abnormal. Background activities and posteriorly dominant rhythm are mildly too slow, suggesting mild diffuse cerebral dysfunction of nonspecific etiology.     PLAN:  Neurology consult.  Continue Keppra 1000 mg twice daily.  Video EEG- pending transfer to [facility].  Ativan 1 mg IV as needed for persistent seizure activity over 3 minutes.  Seizure precaution reviewed with patient  ANASTASIA IM.

## 2024-02-11 ENCOUNTER — APPOINTMENT (INPATIENT)
Dept: NEUROLOGY | Facility: CLINIC | Age: 79
DRG: 101 | End: 2024-02-11
Payer: MEDICARE

## 2024-02-11 ENCOUNTER — HOSPITAL ENCOUNTER (INPATIENT)
Facility: HOSPITAL | Age: 79
LOS: 4 days | Discharge: OTHER INSTITUTION | DRG: 101 | End: 2024-02-15
Attending: STUDENT IN AN ORGANIZED HEALTH CARE EDUCATION/TRAINING PROGRAM | Admitting: STUDENT IN AN ORGANIZED HEALTH CARE EDUCATION/TRAINING PROGRAM
Payer: MEDICARE

## 2024-02-11 VITALS
TEMPERATURE: 98.5 F | RESPIRATION RATE: 18 BRPM | OXYGEN SATURATION: 93 % | SYSTOLIC BLOOD PRESSURE: 102 MMHG | DIASTOLIC BLOOD PRESSURE: 62 MMHG | HEART RATE: 98 BPM

## 2024-02-11 DIAGNOSIS — R26.2 AMBULATORY DYSFUNCTION: ICD-10-CM

## 2024-02-11 DIAGNOSIS — Z86.711 HISTORY OF PULMONARY EMBOLISM: ICD-10-CM

## 2024-02-11 DIAGNOSIS — R56.9 SEIZURE-LIKE ACTIVITY (HCC): Primary | ICD-10-CM

## 2024-02-11 DIAGNOSIS — N18.31 STAGE 3A CHRONIC KIDNEY DISEASE (HCC): ICD-10-CM

## 2024-02-11 DIAGNOSIS — R53.1 GENERALIZED WEAKNESS: ICD-10-CM

## 2024-02-11 LAB
ANION GAP SERPL CALCULATED.3IONS-SCNC: 12 MMOL/L
BUN SERPL-MCNC: 9 MG/DL (ref 5–25)
CALCIUM SERPL-MCNC: 9.2 MG/DL (ref 8.4–10.2)
CHLORIDE SERPL-SCNC: 94 MMOL/L (ref 96–108)
CO2 SERPL-SCNC: 25 MMOL/L (ref 21–32)
CREAT SERPL-MCNC: 0.78 MG/DL (ref 0.6–1.3)
GFR SERPL CREATININE-BSD FRML MDRD: 74 ML/MIN/1.73SQ M
GLUCOSE SERPL-MCNC: 92 MG/DL (ref 65–140)
POTASSIUM SERPL-SCNC: 3.3 MMOL/L (ref 3.5–5.3)
SODIUM SERPL-SCNC: 131 MMOL/L (ref 135–147)

## 2024-02-11 PROCEDURE — 95715 VEEG EA 12-26HR INTMT MNTR: CPT

## 2024-02-11 PROCEDURE — 99239 HOSP IP/OBS DSCHRG MGMT >30: CPT | Performed by: INTERNAL MEDICINE

## 2024-02-11 PROCEDURE — NC001 PR NO CHARGE: Performed by: STUDENT IN AN ORGANIZED HEALTH CARE EDUCATION/TRAINING PROGRAM

## 2024-02-11 PROCEDURE — 99222 1ST HOSP IP/OBS MODERATE 55: CPT | Performed by: STUDENT IN AN ORGANIZED HEALTH CARE EDUCATION/TRAINING PROGRAM

## 2024-02-11 PROCEDURE — 99233 SBSQ HOSP IP/OBS HIGH 50: CPT | Performed by: STUDENT IN AN ORGANIZED HEALTH CARE EDUCATION/TRAINING PROGRAM

## 2024-02-11 PROCEDURE — 80048 BASIC METABOLIC PNL TOTAL CA: CPT

## 2024-02-11 PROCEDURE — 95700 EEG CONT REC W/VID EEG TECH: CPT

## 2024-02-11 RX ORDER — CYANOCOBALAMIN 1000 UG/ML
1000 INJECTION, SOLUTION INTRAMUSCULAR; SUBCUTANEOUS
Status: DISCONTINUED | OUTPATIENT
Start: 2024-03-08 | End: 2024-02-12

## 2024-02-11 RX ORDER — POTASSIUM CHLORIDE 20 MEQ/1
40 TABLET, EXTENDED RELEASE ORAL ONCE
Status: COMPLETED | OUTPATIENT
Start: 2024-02-11 | End: 2024-02-11

## 2024-02-11 RX ORDER — MELATONIN
1000 DAILY
Status: DISCONTINUED | OUTPATIENT
Start: 2024-02-12 | End: 2024-02-15 | Stop reason: HOSPADM

## 2024-02-11 RX ORDER — LEVETIRACETAM 750 MG/1
1500 TABLET ORAL EVERY 12 HOURS SCHEDULED
Status: DISCONTINUED | OUTPATIENT
Start: 2024-02-11 | End: 2024-02-15 | Stop reason: HOSPADM

## 2024-02-11 RX ORDER — MELATONIN
1000 DAILY
Status: CANCELLED | OUTPATIENT
Start: 2024-02-12

## 2024-02-11 RX ORDER — LEVETIRACETAM 500 MG/1
1500 TABLET ORAL EVERY 12 HOURS SCHEDULED
Status: CANCELLED | OUTPATIENT
Start: 2024-02-11

## 2024-02-11 RX ORDER — EZETIMIBE 10 MG/1
10 TABLET ORAL
Status: DISCONTINUED | OUTPATIENT
Start: 2024-02-11 | End: 2024-02-15 | Stop reason: HOSPADM

## 2024-02-11 RX ORDER — CYANOCOBALAMIN 1000 UG/ML
1000 INJECTION, SOLUTION INTRAMUSCULAR; SUBCUTANEOUS
Status: CANCELLED | OUTPATIENT
Start: 2024-03-08 | End: 2024-08-05

## 2024-02-11 RX ORDER — POTASSIUM CHLORIDE 20 MEQ/1
20 TABLET, EXTENDED RELEASE ORAL DAILY
Status: CANCELLED | OUTPATIENT
Start: 2024-02-12

## 2024-02-11 RX ORDER — EZETIMIBE 10 MG/1
10 TABLET ORAL
Status: CANCELLED | OUTPATIENT
Start: 2024-02-11

## 2024-02-11 RX ORDER — POTASSIUM CHLORIDE 20 MEQ/1
20 TABLET, EXTENDED RELEASE ORAL DAILY
Status: DISCONTINUED | OUTPATIENT
Start: 2024-02-12 | End: 2024-02-15 | Stop reason: HOSPADM

## 2024-02-11 RX ORDER — LORAZEPAM 2 MG/ML
1 INJECTION INTRAMUSCULAR EVERY 6 HOURS PRN
Status: DISCONTINUED | OUTPATIENT
Start: 2024-02-11 | End: 2024-02-15 | Stop reason: HOSPADM

## 2024-02-11 RX ORDER — DIVALPROEX SODIUM 500 MG/1
500 TABLET, EXTENDED RELEASE ORAL EVERY 12 HOURS
Status: CANCELLED | OUTPATIENT
Start: 2024-02-11

## 2024-02-11 RX ORDER — CYANOCOBALAMIN 1000 UG/ML
1000 INJECTION, SOLUTION INTRAMUSCULAR; SUBCUTANEOUS WEEKLY
Status: DISCONTINUED | OUTPATIENT
Start: 2024-02-16 | End: 2024-02-12

## 2024-02-11 RX ORDER — TRIAMTERENE AND HYDROCHLOROTHIAZIDE 37.5; 25 MG/1; MG/1
1 TABLET ORAL DAILY
Status: CANCELLED | OUTPATIENT
Start: 2024-02-12

## 2024-02-11 RX ORDER — TRIAMTERENE AND HYDROCHLOROTHIAZIDE 37.5; 25 MG/1; MG/1
1 TABLET ORAL DAILY
Status: DISCONTINUED | OUTPATIENT
Start: 2024-02-12 | End: 2024-02-15 | Stop reason: HOSPADM

## 2024-02-11 RX ORDER — DIVALPROEX SODIUM 500 MG/1
500 TABLET, EXTENDED RELEASE ORAL EVERY 12 HOURS
Status: DISCONTINUED | OUTPATIENT
Start: 2024-02-11 | End: 2024-02-15 | Stop reason: HOSPADM

## 2024-02-11 RX ORDER — LORAZEPAM 2 MG/ML
1 INJECTION INTRAMUSCULAR EVERY 6 HOURS PRN
Status: CANCELLED | OUTPATIENT
Start: 2024-02-11

## 2024-02-11 RX ORDER — CYANOCOBALAMIN 1000 UG/ML
1000 INJECTION, SOLUTION INTRAMUSCULAR; SUBCUTANEOUS WEEKLY
Status: CANCELLED | OUTPATIENT
Start: 2024-02-16 | End: 2024-03-08

## 2024-02-11 RX ADMIN — APIXABAN 5 MG: 5 TABLET, FILM COATED ORAL at 17:13

## 2024-02-11 RX ADMIN — EZETIMIBE 10 MG: 10 TABLET ORAL at 21:03

## 2024-02-11 RX ADMIN — CARBIDOPA AND LEVODOPA 3 TABLET: 25; 100 TABLET ORAL at 21:03

## 2024-02-11 RX ADMIN — POTASSIUM CHLORIDE 40 MEQ: 1500 TABLET, EXTENDED RELEASE ORAL at 10:07

## 2024-02-11 RX ADMIN — POTASSIUM CHLORIDE 20 MEQ: 1500 TABLET, EXTENDED RELEASE ORAL at 10:07

## 2024-02-11 RX ADMIN — CARBIDOPA AND LEVODOPA 3 TABLET: 25; 100 TABLET ORAL at 13:27

## 2024-02-11 RX ADMIN — LEVETIRACETAM 1500 MG: 750 TABLET, FILM COATED ORAL at 21:03

## 2024-02-11 RX ADMIN — DIVALPROEX SODIUM 500 MG: 500 TABLET, FILM COATED, EXTENDED RELEASE ORAL at 10:04

## 2024-02-11 RX ADMIN — Medication 1000 UNITS: at 10:04

## 2024-02-11 RX ADMIN — APIXABAN 5 MG: 5 TABLET, FILM COATED ORAL at 10:04

## 2024-02-11 RX ADMIN — TRIAMTERENE AND HYDROCHLOROTHIAZIDE 1 TABLET: 37.5; 25 TABLET ORAL at 10:05

## 2024-02-11 RX ADMIN — LEVETIRACETAM 1500 MG: 500 TABLET, FILM COATED ORAL at 10:04

## 2024-02-11 RX ADMIN — CARBIDOPA AND LEVODOPA 3 TABLET: 25; 100 TABLET ORAL at 17:13

## 2024-02-11 RX ADMIN — DIVALPROEX SODIUM 500 MG: 500 TABLET, EXTENDED RELEASE ORAL at 21:03

## 2024-02-11 RX ADMIN — CARBIDOPA AND LEVODOPA 3 TABLET: 25; 100 TABLET ORAL at 10:04

## 2024-02-11 NOTE — QUICK NOTE
Reached out to Network PAC transfer RN role on [employer] with request on update regarding bed status and report was given that [facility] is currently at capacity, there are no discharges currently written to make a plan for any of the transfers waiting to come in. No timeline for transfer currently.

## 2024-02-11 NOTE — PLAN OF CARE
Problem: Prexisting or High Potential for Compromised Skin Integrity  Goal: Skin integrity is maintained or improved  Description: INTERVENTIONS:  - Identify patients at risk for skin breakdown  - Assess and monitor skin integrity  - Assess and monitor nutrition and hydration status  - Monitor labs   - Assess for incontinence   - Turn and reposition patient  - Assist with mobility/ambulation  - Relieve pressure over bony prominences  - Avoid friction and shearing  - Provide appropriate hygiene as needed including keeping skin clean and dry  - Evaluate need for skin moisturizer/barrier cream  - Collaborate with interdisciplinary team   - Patient/family teaching  - Consider wound care consult   Outcome: Progressing     Problem: PAIN - ADULT  Goal: Verbalizes/displays adequate comfort level or baseline comfort level  Description: Interventions:  - Encourage patient to monitor pain and request assistance  - Assess pain using appropriate pain scale  - Administer analgesics based on type and severity of pain and evaluate response  - Implement non-pharmacological measures as appropriate and evaluate response  - Consider cultural and social influences on pain and pain management  - Notify physician/advanced practitioner if interventions unsuccessful or patient reports new pain  Outcome: Progressing     Problem: INFECTION - ADULT  Goal: Absence or prevention of progression during hospitalization  Description: INTERVENTIONS:  - Assess and monitor for signs and symptoms of infection  - Monitor lab/diagnostic results  - Monitor all insertion sites, i.e. indwelling lines, tubes, and drains  - Monitor endotracheal if appropriate and nasal secretions for changes in amount and color  - [place] appropriate cooling/warming therapies per order  - Administer medications as ordered  - Instruct and encourage patient and family to use good hand hygiene technique  - Identify and instruct in appropriate isolation precautions for  identified infection/condition  Outcome: Progressing  Goal: Absence of fever/infection during neutropenic period  Description: INTERVENTIONS:  - Monitor WBC    Outcome: Progressing     Problem: SAFETY ADULT  Goal: Patient will remain free of falls  Description: INTERVENTIONS:  - Educate patient/family on patient safety including physical limitations  - Instruct patient to call for assistance with activity   - Consult OT/PT to assist with strengthening/mobility   - Keep Call bell within reach  - Keep bed low and locked with side rails adjusted as appropriate  - Keep care items and personal belongings within reach  - Initiate and maintain comfort rounds  - Make Fall Risk Sign visible to staff  - Offer Toileting every  Hours, in advance of need  - Initiate/Maintain alarm  - Obtain necessary fall risk management equipment:   - Apply yellow socks and bracelet for high fall risk patients  - Consider moving patient to room near nurses station  Outcome: Progressing  Goal: Maintain or return to baseline ADL function  Description: INTERVENTIONS:  -  Assess patient's ability to carry out ADLs; assess patient's baseline for ADL function and identify physical deficits which impact ability to perform ADLs (bathing, care of mouth/teeth, toileting, grooming, dressing, etc.)  - Assess/evaluate cause of self-care deficits   - Assess range of motion  - Assess patient's mobility; develop plan if impaired  - Assess patient's need for assistive devices and provide as appropriate  - Encourage maximum independence but intervene and supervise when necessary  - Involve family in performance of ADLs  - Assess for home care needs following discharge   - Consider OT consult to assist with ADL evaluation and planning for discharge  - Provide patient education as appropriate  Outcome: Progressing  Goal: Maintains/Returns to pre admission functional level  Description: INTERVENTIONS:  - Perform AM-PAC 6 Click Basic Mobility/ Daily Activity  assessment daily.  - Set and communicate daily mobility goal to care team and patient/family/caregiver.   - Collaborate with rehabilitation services on mobility goals if consulted  - Perform Range of Motion  times a day.  - Reposition patient every  hours.  - Dangle patient  times a day  - Stand patient  times a day  - Ambulate patient  times a day  - Out of bed to chair  times a day   - Out of bed for meals  times a day  - Out of bed for toileting  - Record patient progress and toleration of activity level   Outcome: Progressing     Problem: DISCHARGE PLANNING  Goal: Discharge to home or other facility with appropriate resources  Description: INTERVENTIONS:  - Identify barriers to discharge w/patient and caregiver  - Arrange for needed discharge resources and transportation as appropriate  - Identify discharge learning needs (meds, wound care, etc.)  - Arrange for interpretive services to assist at discharge as needed  - Refer to Case Management Department for coordinating discharge planning if the patient needs post-hospital services based on physician/advanced practitioner order or complex needs related to functional status, cognitive ability, or social support system  Outcome: Progressing     Problem: Knowledge Deficit  Goal: Patient/family/caregiver demonstrates understanding of disease process, treatment plan, medications, and discharge instructions  Description: Complete learning assessment and assess knowledge base.  Interventions:  - Provide teaching at level of understanding  - Provide teaching via preferred learning methods  Outcome: Progressing     Problem: NEUROSENSORY - ADULT  Goal: Achieves stable or improved neurological status  Description: INTERVENTIONS  - Monitor and report changes in neurological status  - Monitor vital signs such as temperature, blood pressure, glucose, and any other labs ordered   - Initiate measures to prevent increased intracranial pressure  - Monitor for seizure activity and  implement precautions if appropriate      Outcome: Progressing  Goal: Remains free of injury related to seizures activity  Description: INTERVENTIONS  - Maintain airway, patient safety  and administer oxygen as ordered  - Monitor patient for seizure activity, document and report duration and description of seizure to physician/advanced practitioner  - If seizure occurs,  ensure patient safety during seizure  - Reorient patient post seizure  - Seizure pads on all 4 side rails  - Instruct patient/family to notify RN of any seizure activity including if an aura is experienced  - Instruct patient/family to call for assistance with activity based on nursing assessment  - Administer anti-seizure medications if ordered    Outcome: Progressing  Goal: Achieves maximal functionality and self care  Description: INTERVENTIONS  - Monitor swallowing and airway patency with patient fatigue and changes in neurological status  - Encourage and assist patient to increase activity and self care.   - Encourage visually impaired, hearing impaired and aphasic patients to use assistive/communication devices  Outcome: Progressing

## 2024-02-11 NOTE — CONSULTS
Duplicate order. Pt seen in consultation at [facility]. See progress note from today. Neurology will continue to follow.

## 2024-02-11 NOTE — ASSESSMENT & PLAN NOTE
Lab Results   Component Value Date    EGFR 74 02/11/2024    EGFR 86 02/10/2024    EGFR 84 02/08/2024    CREATININE 0.78 02/11/2024    CREATININE 0.66 02/10/2024    CREATININE 0.69 02/08/2024   At baseline  Plan:  Avoid nephrotoxins  Avoid hypotension.

## 2024-02-11 NOTE — QUICK NOTE
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 Javi العراقي was consulted through phone, he recommended giving patient IV bolus of Depakote 20 mg/kg, starting patient on Depakote extended release 100 mg tablets every 12 hours starting at 10 PM today.  hepatic function  was discussed with Dr. العراقي and patient was considered stable to be started on Depakote.

## 2024-02-11 NOTE — ASSESSMENT & PLAN NOTE
Patient has history of anxiety.  Thinks that anxiety is provoking seizures.  Currently not on any medications.  Pulse: 98    Plan:  Consider Atarax.

## 2024-02-11 NOTE — PROGRESS NOTES
[facility]  Progress Note  Name: Kamilla Pat I  MRN: [id number]  Unit/Bed#: S -01 I Date of Admission: 2/8/2024   Date of Service: 2/11/2024 I Hospital Day: 3    Assessment/Plan   * Seizure-like activity (HCC)  Assessment & Plan  Patient reports having seizure-like activity for the past 3 nights  Seizure-like activity tonight lasted for around 15 minutes  Patient family reports patient was found to have generalized shaking and arm jerking and then full body shaking with loss of consciousness  No urinary or bowel incontinence reported, tongue bite present on the left side  Patient was recently admitted on January 12, due to PE and was found to have seizure in the hospital  Patient was started on Keppra 750 Mg twice daily  MRI brain without contrast on 1/14/2024 with no acute infarction, intracranial hemorrhage or mass effect.  1/14: EEG: Abnormal with Generalized irregular delta activity   Lactic acid: 11.8 - 1.6   B12 low  Patient was readmitted on the same-day of discharge. Recurrent similar seizure episode on the way home. Neuro- oncall recommended xferring to [facility] for vEEG.    Workups:  CT head: Stable findings without acute intracranial abnormality.   MRI w/ con: Stable MRI of the brain with no acute intracranial abnormality. 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  EEG: This Routine EEG recorded during wakefulness, drowsiness, and sleep is abnormal. Background activities and posteriorly dominant rhythm are mildly too slow, suggesting mild diffuse cerebral dysfunction of nonspecific etiology.     2/10: 2 episodes of seizure activities. AM with urinary incontinence and postictal confusion per nursing. PM with tonic-clonic activities only.    PLAN:  Neurology consult.  Continue Keppra 1500 mg twice daily.  Started on Depakote  mg twice daily per neurology.  Video EEG- pending transfer to [facility].  Ativan 1 mg IV as needed for  persistent seizure activity over 3 minutes.  Seizure precaution reviewed with patient  ANASTASIA DAVIS.    CKD (chronic kidney disease) stage 2, GFR 60-89 ml/min  Assessment & Plan  Lab Results   Component Value Date    EGFR 74 02/11/2024    EGFR 86 02/10/2024    EGFR 84 02/08/2024    CREATININE 0.78 02/11/2024    CREATININE 0.66 02/10/2024    CREATININE 0.69 02/08/2024   At baseline  Plan:  Avoid nephrotoxins  Avoid hypotension.    History of pulmonary embolism  Assessment & Plan  Diagnosed with PE on 1/11/2024.  Currently on Eliquis 5 mg twice a day.  As per her previous discharge summary she has to be on Eliquis for total 6 months.    Plan:  Continue Eliquis 5 mg twice daily    Anxiety  Assessment & Plan  Patient has history of anxiety.  Thinks that anxiety is provoking seizures.  Currently not on any medications.  Pulse: 90    Plan:  Consider Atarax.      Parkinson disease  Assessment & Plan  Currently taking Sinemet immediate release 3 tablets 4 times daily.  Patient was seen by movement disorder specialist in the month of December 2023 and increase the dose.  The first-time seizure that she had was in December/2023.  Patient does not recall if the seizure onset is prior to or after the dose adjustment.  She has been feeling restless, agitated, anxious, and having insomnia.  Suspecting increase in dopamine levels.     Plan:  Continue home dosage Sinemet for now or per neurology.           VTE Pharmacologic Prophylaxis: VTE Score: 4 Moderate Risk (Score 3-4) - Pharmacological DVT Prophylaxis Ordered: apixaban (Eliquis).    Mobility:   Basic Mobility Inpatient Raw Score: 17  JH-HLM Goal: 5: Stand one or more mins  JH-HLM Achieved: 6: Walk 10 steps or more  HLM Goal achieved. Continue to encourage appropriate mobility.    Patient Centered Rounds: I performed bedside rounds with nursing staff today.  Discussions with Specialists or Other Care Team Provider: Neuro, attending    Education and Discussions with Family /  Patient: Updated  () at bedside.    Current Length of Stay: 3 day(s)  Current Patient Status: Inpatient   Discharge Plan:  Pending [facility] xferring    Code Status: Level 1 - Full Code    Subjective:   Patient was seen and examined at bedside.  Seems lying comfortably on the bed. Last 24-hour events noted. 2 episodes of seizure activities. AM with urinary incontinence and confusion per nursing. Per neurology, she was loaded with Depakote and also put on 500 mg twice daily.  They also increased her Keppra to 1500 mg twice daily.  Patient was anxious, verbalized frustration due to the recurrent seizure episodes.  Otherwise, she denied any acute complaints.    Objective:     Vitals:   Temp (24hrs), Av.4 °F (36.9 °C), Min:97.9 °F (36.6 °C), Max:98.8 °F (37.1 °C)    Temp:  [97.9 °F (36.6 °C)-98.8 °F (37.1 °C)] 98.8 °F (37.1 °C)  HR:  [] 90  Resp:  [16-18] 18  BP: (105-183)/(69-90) 135/79  SpO2:  [90 %-95 %] 92 %  There is no height or weight on file to calculate BMI.     Input and Output Summary (last 24 hours):   No intake or output data in the 24 hours ending 24 0908    Physical Exam:   Physical Exam  Vitals and nursing note reviewed.   Constitutional:       General: She is not in acute distress.     Appearance: Normal appearance.   HENT:      Nose: Nose normal.      Mouth/Throat:      Pharynx: Oropharynx is clear.   Eyes:      General:         Right eye: No discharge.         Left eye: No discharge.      Extraocular Movements: Extraocular movements intact.      Conjunctiva/sclera: Conjunctivae normal.   Cardiovascular:      Rate and Rhythm: Normal rate and regular rhythm.      Pulses: Normal pulses.   Pulmonary:      Effort: Pulmonary effort is normal. No respiratory distress.      Breath sounds: Normal breath sounds. No wheezing, rhonchi or rales.   Abdominal:      General: Abdomen is flat. Bowel sounds are normal.      Palpations: Abdomen is soft.      Tenderness: There is no  abdominal tenderness. There is no guarding or rebound.   Musculoskeletal:         General: Normal range of motion.      Cervical back: Normal range of motion.      Right lower leg: No edema.      Left lower leg: No edema.   Skin:     General: Skin is warm.      Capillary Refill: Capillary refill takes less than 2 seconds.   Neurological:      Mental Status: She is alert and oriented to person, place, and time. Mental status is at baseline.   Psychiatric:      Comments: Anxious          Additional Data:     Labs:  Results from last 7 days   Lab Units 02/10/24  0516 02/09/24  0630   WBC Thousand/uL 5.76 4.21*   HEMOGLOBIN g/dL 10.6* 10.2*   HEMATOCRIT % 31.9* 31.5*   PLATELETS Thousands/uL 182 176   NEUTROS PCT %  --  72   LYMPHS PCT %  --  13*   MONOS PCT %  --  11   EOS PCT %  --  2     Results from last 7 days   Lab Units 02/11/24  0300 02/08/24  1842 02/08/24  1535   SODIUM mmol/L 131*   < > 136   POTASSIUM mmol/L 3.3*   < > 3.4*   CHLORIDE mmol/L 94*   < > 101   CO2 mmol/L 25   < > 10*   BUN mg/dL 9   < > 8   CREATININE mg/dL 0.78   < > 0.87   ANION GAP mmol/L 12   < > 25   CALCIUM mg/dL 9.2   < > 9.6   ALBUMIN g/dL  --   --  4.4   TOTAL BILIRUBIN mg/dL  --   --  1.18*   ALK PHOS U/L  --   --  87   ALT U/L  --   --  <3*   AST U/L  --   --  6*   GLUCOSE RANDOM mg/dL 92   < > 135    < > = values in this interval not displayed.     Results from last 7 days   Lab Units 02/08/24  1535   INR  1.26*     Results from last 7 days   Lab Units 02/06/24  2034   POC GLUCOSE mg/dl 132     Results from last 7 days   Lab Units 02/08/24  0450   HEMOGLOBIN A1C % 5.2     Results from last 7 days   Lab Units 02/06/24  2340 02/06/24  2107   LACTIC ACID mmol/L 1.6 11.8*       Lines/Drains:  Invasive Devices       Peripheral Intravenous Line  Duration             Peripheral IV 02/08/24 Left;Proximal;Ventral (anterior) Forearm 2 days                          Imaging: No pertinent imaging reviewed.    Recent Cultures (last 7 days):          Last 24 Hours Medication List:   Current Facility-Administered Medications   Medication Dose Route Frequency Provider Last Rate    apixaban  5 mg Oral BID Lawanda Mesa MD      carbidopa-levodopa  3 tablet Oral 4x Daily Lawanda Mesa MD      cholecalciferol  1,000 Units Oral Daily Lawanda Mesa MD      cyanocobalamin  1,000 mcg Intramuscular Weekly Keaton Richards DO      Followed by    [START ON 3/8/2024] cyanocobalamin  1,000 mcg Intramuscular Q30 Days Keaton Richards DO      divalproex sodium  500 mg Oral Q12H Valentino Keith MD      ezetimibe  10 mg Oral HS Lawanda Mesa MD      levETIRAcetam  1,500 mg Oral Q12H [facility] Keaton Richards DO      LORazepam  1 mg Intravenous Q6H PRN Lawanda Mesa MD      potassium chloride  20 mEq Oral Daily Evans Alvarado MD      triamterene-hydrochlorothiazide  1 tablet Oral Daily Lawanda Mesa MD          Today, Patient Was Seen By: Ruperto Sexton MD    **Please Note: This note may have been constructed using a voice recognition system.**

## 2024-02-11 NOTE — PROGRESS NOTES
NEUROLOGY RESIDENCY PROGRESS NOTE     Name: Kamilla Pat   Age & Sex: 78 y.o. female   MRN: [id number]  Unit/Bed#: [facility] 717-01   Encounter: [id number]    Kamilla Pat will need follow up in 6 weeks with epilepsy attending/AP .  She will not require outpatient neurological testing.      Pending for discharge: Clinical Improvement    ASSESSMENT & PLAN     * Seizure-like activity (HCC)  Assessment & Plan  Assessment:  Patient is a 78-year-old right-handed female with past medical history of Parkinson disease, seizure-like activity, hypercholesterolemia, CKD stage III, history of multiple subsegmental pulmonary emoli that presented to [facility] on 2024 for seizure-like activity.  Patient was discharged the same day, and shortly after discharge patient was reported to have had another episode of seizure described as jaw clenching, then subsequent generalization to involve whole body tonic-clonic activity, lasting 5 minutes.  Transferred to [facility] for spell characterization.    Workup:  Lab Results   Component Value Date    AST 6 (L) 2024    ALT <3 (L) 2024    TBILI 1.18 (H) 2024    LACTICACID 1.6 2024    LEVETIRACETA 19.5 2024     Blood glucose: 132  CMP/ca: Na 134, CO2 9, K 3.1, Tbil 1.28  Ma.1  Lactic acid: 11.6 --> 1.8  Urine Tox: WNL  UA Microscopic: WNL  CT head: Stable findings without acute intracranial abnormality.  MRI Brain w wo contrast: Stable MRI of the brain with no acute intracranial abnormality. 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   Routine EEG: This Routine EEG recorded during wakefulness, drowsiness, and sleep is abnormal. Background activities and posteriorly dominant rhythm are mildly too slow, suggesting mild diffuse cerebral dysfunction of nonspecific etiology.   vEEG: pending    Impression:  Unclear reasoning as to why patient has been experiencing increasing seizure  episodes.  Further evaluation with MRI demonstrated no acute intracranial pathology, and EEG demonstrated findings consistent with mild cerebral diffuse dysfunction of nonspecific etiology, video EEG monitoring is indicated for further spell characterization. Could be possibly secondary to patient's increased Sinemet dosing as well but first, we will try to capture an episode on vEEG.      Plan:  Case discussed with attending neurologist Dr. Falcon  Maintain normotension, normothermia, euglycemia  Recommend levetiracetam 1500 mg q12, Depakote 500 mg q12  Depakote level for tomorrow ordered  [employer] [place] at previous campus  Continue to evaluate and treat any infectious, inflammatory, metabolic derangements  Continue PT/OT  Continue seizure precautions  DVT prophylaxis per primary team  Recommend 1 mg of intravenous lorazepam for generalized tonic-clonic seizure only lasting more than 3 minutes, please contact neurology  Recommend noncontrast CT head, STAT, to be completed for any acute change in mental status/neurologic examination, please contact neurology  Rest per primary team appreciated    Parkinson disease  Assessment & Plan  Recommend continuing carbidopa levodopa home dosing.        SUBJECTIVE     Patient is a 78-year-old female presenting to [facility] as a transfer from [facility] for video EEG monitoring.  Today, patient states the she is doing well.     Patient states that she is aware during her seizures and she has prodromal symptoms of left leg shaking.  As per chart review, patient did reach out to her neurology office and [facility] in regards to increased dosing of Sinemet possibly causing the seizures.  At one point patient was on 2.5 tablets 4 times daily which is giving her worsening symptoms and was brought down to 2 tablets 4 times daily.  Unable to identify when the switch was made but during this admission, patient is on 3 tablets 4 times daily.    Patient had  no other questions or concerns at this time.  Patient was seen and examined. No acute events overnight.       Review of Systems   Neurological:  Positive for seizures. Negative for dizziness, tremors, syncope, facial asymmetry, speech difficulty, weakness, light-headedness, numbness and headaches.     OBJECTIVE     Patient ID: Kamilla Pat is a 78 y.o. female.    Vitals:    24 1625   BP: 96/61   BP Location: Right arm   Temp: 97.7 °F (36.5 °C)      Temperature:   Temp (24hrs), Av.4 °F (36.9 °C), Min:97.7 °F (36.5 °C), Max:98.8 °F (37.1 °C)    Temperature: 97.7 °F (36.5 °C)    Physical Exam  Vitals reviewed.   Constitutional:       Appearance: Normal appearance.   HENT:      Head: Normocephalic and atraumatic.   Eyes:      Extraocular Movements: Extraocular movements intact and EOM normal.      Conjunctiva/sclera: Conjunctivae normal.      Pupils: Pupils are equal, round, and reactive to light.   Cardiovascular:      Rate and Rhythm: Tachycardia present.   Pulmonary:      Effort: Pulmonary effort is normal.   Skin:     General: Skin is warm.   Neurological:      Mental Status: She is alert.      Cranial Nerves: Cranial nerves 2-12 are intact.      Motor: Motor strength is normal.     Coordination: Finger-Nose-Finger Test normal.      Deep Tendon Reflexes:      Reflex Scores:       Tricep reflexes are 2+ on the right side and 2+ on the left side.       Bicep reflexes are 2+ on the right side and 2+ on the left side.       Brachioradialis reflexes are 2+ on the right side and 2+ on the left side.       Patellar reflexes are 0 (Knee replacement) on the right side and 0 (Knee replacement) on the left side.       Achilles reflexes are 2+ on the right side and 2+ on the left side.  Psychiatric:         Mood and Affect: Mood normal.         Speech: Speech normal.         Behavior: Behavior normal.          Neurologic Exam     Mental Status   Oriented to person.   Oriented to place.   Oriented to year, month  and date.   Attention: normal. Concentration: normal.   Speech: speech is normal   Level of consciousness: alert  Knowledge: good.     Cranial Nerves   Cranial nerves II through XII intact.     CN II   Visual fields full to confrontation.     CN III, IV, VI   Pupils are equal, round, and reactive to light.  Extraocular motions are normal.     CN V   Facial sensation intact.     CN VII   Facial expression full, symmetric.     CN VIII   CN VIII normal.     CN IX, X   CN IX normal.   CN X normal.     CN XI   CN XI normal.     CN XII   CN XII normal.     Motor Exam   Right arm pronator drift: absent  Left arm pronator drift: absent    Strength   Strength 5/5 throughout.     Sensory Exam   Light touch normal.     Gait, Coordination, and Reflexes     Coordination   Finger to nose coordination: normal    Reflexes   Right brachioradialis: 2+  Left brachioradialis: 2+  Right biceps: 2+  Left biceps: 2+  Right triceps: 2+  Left triceps: 2+  Right patellar: 0 (Knee replacement)  Left patellar: 0 (Knee replacement)  Right achilles: 2+  Left achilles: 2+      LABORATORY DATA     Labs: I have personally reviewed pertinent reports.    Results from last 7 days   Lab Units 02/10/24  0516 02/09/24  0630 02/08/24  1535 02/07/24 0441 02/06/24  2107   WBC Thousand/uL 5.76 4.21* 10.98*   < > 6.44   HEMOGLOBIN g/dL 10.6* 10.2* 12.3   < > 12.5   HEMATOCRIT % 31.9* 31.5* 39.8   < > 39.8   PLATELETS Thousands/uL 182 176 277   < > 257   NEUTROS PCT %  --  72 79*  --  78*   MONOS PCT %  --  11 8  --  6   EOS PCT %  --  2 1  --  0    < > = values in this interval not displayed.      Results from last 7 days   Lab Units 02/11/24  0300 02/10/24  0516 02/08/24  1842 02/08/24  1535 02/07/24  0441 02/06/24  2110   SODIUM mmol/L 131* 130* 135 136   < > 134*   POTASSIUM mmol/L 3.3* 3.4* 3.8 3.4*   < > 3.1*   CHLORIDE mmol/L 94* 97 104 101   < > 98   CO2 mmol/L 25 22 22 10*   < > 9*   BUN mg/dL 9 5 8 8   < > 10   CREATININE mg/dL 0.78 0.66 0.69 0.87    < > 0.88   CALCIUM mg/dL 9.2 8.7 9.0 9.6   < > 9.5   ALK PHOS U/L  --   --   --  87  --  92   ALT U/L  --   --   --  <3*  --  <3*   AST U/L  --   --   --  6*  --  7*    < > = values in this interval not displayed.     Results from last 7 days   Lab Units 02/09/24  0630 02/06/24  2110   MAGNESIUM mg/dL 2.0 2.1     Results from last 7 days   Lab Units 02/06/24  2110   PHOSPHORUS mg/dL 3.9      Results from last 7 days   Lab Units 02/08/24  1535 02/06/24  2107   INR  1.26* 1.04   PTT seconds 23 24     Results from last 7 days   Lab Units 02/06/24  2340   LACTIC ACID mmol/L 1.6           IMAGING & DIAGNOSTIC TESTING     Radiology Results: I have personally reviewed pertinent reports.   and I have personally reviewed pertinent films in PACS    No orders to display       Other Diagnostic Testing: I have personally reviewed pertinent reports.      ACTIVE MEDICATIONS     Current Facility-Administered Medications   Medication Dose Route Frequency    apixaban (ELIQUIS) tablet 5 mg  5 mg Oral BID    carbidopa-levodopa (SINEMET)  mg per tablet 3 tablet  3 tablet Oral 4x Daily    [START ON 2/12/2024] cholecalciferol (VITAMIN D3) tablet 1,000 Units  1,000 Units Oral Daily    [START ON 2/16/2024] cyanocobalamin injection 1,000 mcg  1,000 mcg Intramuscular Weekly    Followed by    [START ON 3/8/2024] cyanocobalamin injection 1,000 mcg  1,000 mcg Intramuscular Q30 Days    divalproex sodium (DEPAKOTE ER) 24 hr tablet 500 mg  500 mg Oral Q12H    ezetimibe (ZETIA) tablet 10 mg  10 mg Oral HS    levETIRAcetam (KEPPRA) tablet 1,500 mg  1,500 mg Oral Q12H CESAR    LORazepam (ATIVAN) injection 1 mg  1 mg Intravenous Q6H PRN    [START ON 2/12/2024] potassium chloride (Klor-Con M20) CR tablet 20 mEq  20 mEq Oral Daily    [START ON 2/12/2024] triamterene-hydrochlorothiazide (MAXZIDE-25) 37.5-25 mg per tablet 1 tablet  1 tablet Oral Daily       Prior to Admission medications    Medication Sig Start Date End Date Taking? Authorizing  Provider   acetaminophen (TYLENOL) 325 mg tablet Take 325 mg by mouth every 6 (six) hours as needed for mild pain    Historical Provider, MD   apixaban (Eliquis) 5 mg Take 2 tablets (10 mg total) by mouth 2 (two) times a day for 7 days, THEN 1 tablet (5 mg total) 2 (two) times a day for 21 days. 1/12/24 2/9/24  Jonathan Jenkins MD   Calcium Carbonate-Vitamin D (CALCIUM-CARB 600 + D PO) Take 1 tablet by mouth daily    Historical Provider, MD   carbidopa-levodopa (SINEMET)  mg per tablet Take 3 tablets by mouth 4 (four) times a day Please continue to take Sinemet as per your neurologist's instruction. 2/8/24 3/9/24  Ruperto Sexton MD   Cholecalciferol 50 MCG (2000 UT) CAPS Take 1 capsule by mouth daily    Historical Provider, MD   Diclofenac Sodium (VOLTAREN) 1 % Apply 2 g topically 4 (four) times a day 8/15/23   [facility] Cindi Christianson MD   ezetimibe (ZETIA) 10 mg tablet Take by mouth Daily 4/1/19   Historical Provider, MD   levETIRAcetam (KEPPRA) 1000 MG tablet Take 1 tablet (1,000 mg total) by mouth every 12 (twelve) hours 2/8/24 3/9/24  Ruperto Sexton MD   potassium chloride (MICRO-K) 10 MEQ CR capsule Take 20 mEq by mouth Daily Per [facility] note from Nahomi Sandoval,  - 12/19/2023 10:10 AM EST 20 meq.  4/1/19   Historical Provider, MD   triamterene-hydrochlorothiazide (DYAZIDE) 37.5-25 mg per capsule Take 1 capsule by mouth Daily 4/1/19   Historical Provider, MD         VTE Pharmacologic Prophylaxis: Apixaban (Eliquis)  VTE Mechanical Prophylaxis: sequential compression device and foot pump applied    ======    I have discussed the patient's history, physical exam findings, assessment, and plan in detail with attending, Dr. Falcon    Thank you for allowing me to participate in the care of your patient, Kamilla Pat.    Lana Crespo DO  [facility] Neurology Residency, PGY-2

## 2024-02-11 NOTE — ASSESSMENT & PLAN NOTE
Patient reports having seizure-like activity for the past 3 nights  Seizure-like activity tonight lasted for around 15 minutes  Patient family reports patient was found to have generalized shaking and arm jerking and then full body shaking with loss of consciousness  No urinary or bowel incontinence reported, tongue bite present on the left side  Patient was recently admitted on January 12, due to PE and was found to have seizure in the hospital  Patient was started on Keppra 750 Mg twice daily  MRI brain without contrast on 1/14/2024 with no acute infarction, intracranial hemorrhage or mass effect.  1/14: EEG: Abnormal with Generalized irregular delta activity   Lactic acid: 11.8 - 1.6   B12 low  Patient was readmitted on the same-day of discharge. Recurrent similar seizure episode on the way home. Neuro- oncall recommended xferring to B for vEEG.    Workups:  CT head: Stable findings without acute intracranial abnormality.   MRI w/ con: Stable MRI of the brain with no acute intracranial abnormality. 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  EEG: This Routine EEG recorded during wakefulness, drowsiness, and sleep is abnormal. Background activities and posteriorly dominant rhythm are mildly too slow, suggesting mild diffuse cerebral dysfunction of nonspecific etiology.     2/10: 2 episodes of seizure activities. AM with urinary incontinence and postictal confusion per nursing. PM with tonic-clonic activities only.    PLAN:  Neurology consult.  Continue Keppra 1500 mg twice daily.  Started on Depakote  mg twice daily per neurology.  Video EEG- pending transfer to B.  Ativan 1 mg IV as needed for persistent seizure activity over 3 minutes.  Seizure precaution reviewed with patient  [facility] IM.

## 2024-02-11 NOTE — ASSESSMENT & PLAN NOTE
Assessment:  Patient is a 78-year-old right-handed female with past medical history of Parkinson disease, seizure-like activity, hypercholesterolemia, CKD stage III, history of multiple subsegmental pulmonary emoli that presented to [facility] on 2024 for seizure-like activity.  Patient was discharged the same day, and shortly after discharge patient was reported to have had another episode of seizure described as jaw clenching, then subsequent generalization to involve whole body tonic-clonic activity, lasting 5 minutes.  Transferred to [facility] for spell characterization.    Workup:  Lab Results   Component Value Date    AST 4 (L) 2024    ALT <3 (L) 2024    TBILI 0.89 2024    LACTICACID 1.6 2024    LEVETIRACETA 19.5 2024     Blood glucose: 132  CMP/ca: Na 134, CO2 9, K 3.1, Tbil 1.28  Ma.1  Lactic acid: 11.6 --> 1.8  Urine Tox: WNL  UA Microscopic: WNL  CT head: Stable findings without acute intracranial abnormality.  MRI Brain w wo contrast: Stable MRI of the brain with no acute intracranial abnormality. 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   Routine EEG: This Routine EEG recorded during wakefulness, drowsiness, and sleep is abnormal. Background activities and posteriorly dominant rhythm are mildly too slow, suggesting mild diffuse cerebral dysfunction of nonspecific etiology.   vEE24-24 results show background disorganized alpha-theta and delta activity. No epileptiform discharges  Depakote level for this morning = 104      Impression:  Unclear reasoning as to why patient has been experiencing increasing seizure episodes.  Further evaluation with MRI demonstrated no acute intracranial pathology, and EEG demonstrated findings consistent with mild cerebral diffuse dysfunction of nonspecific etiology. Video EEG monitoring for further spell characterization preliminary results show  "\"Background disorganized alpha-theta and delta activity. No epileptiform discharges\". Etiology possibly secondary to patient's increased Sinemet dosing/Levodopa-induced dyskinesia.       Plan:  Case discussed with attending neurologist Dr. Galloway   Maintain normotension, normothermia, euglycemia  Recommend continuing levetiracetam 1500 mg q12, Depakote 500 mg q12 at discharge   As outpatient, check valproate level in 3 days   Counseled to keep log of when she takes medications and to note timing of symptoms   [employer] [facility] at previous campus  Continue to evaluate and treat any infectious, inflammatory, metabolic derangements  Continue PT/OT  Continue seizure precautions  Give Vit B12 injection x 3 days, then weekly. Also, recommend daily 1000 vit B12 oral supplement after injections.   DVT prophylaxis per primary team  Recommend 1 mg of intravenous lorazepam for generalized tonic-clonic seizure only lasting more than 3 minutes, please contact neurology  Recommend noncontrast CT head, STAT, to be completed for any acute change in mental status/neurologic examination, please contact neurology  Rest per primary team appreciated  Follow up with Neuro at [facility] - reports has appointment on 2/29  "

## 2024-02-12 ENCOUNTER — APPOINTMENT (INPATIENT)
Dept: NEUROLOGY | Facility: CLINIC | Age: 79
DRG: 101 | End: 2024-02-12
Payer: MEDICARE

## 2024-02-12 LAB
ALBUMIN SERPL BCP-MCNC: 3.6 G/DL (ref 3.5–5)
ALP SERPL-CCNC: 70 U/L (ref 34–104)
ALT SERPL W P-5'-P-CCNC: <3 U/L (ref 7–52)
ANION GAP SERPL CALCULATED.3IONS-SCNC: 12 MMOL/L
AST SERPL W P-5'-P-CCNC: 4 U/L (ref 13–39)
BILIRUB SERPL-MCNC: 0.89 MG/DL (ref 0.2–1)
BUN SERPL-MCNC: 11 MG/DL (ref 5–25)
CALCIUM SERPL-MCNC: 8.4 MG/DL (ref 8.4–10.2)
CHLORIDE SERPL-SCNC: 100 MMOL/L (ref 96–108)
CO2 SERPL-SCNC: 22 MMOL/L (ref 21–32)
CREAT SERPL-MCNC: 0.6 MG/DL (ref 0.6–1.3)
ERYTHROCYTE [DISTWIDTH] IN BLOOD BY AUTOMATED COUNT: 16.7 % (ref 11.6–15.1)
GFR SERPL CREATININE-BSD FRML MDRD: 88 ML/MIN/1.73SQ M
GLUCOSE SERPL-MCNC: 81 MG/DL (ref 65–140)
HCT VFR BLD AUTO: 35.3 % (ref 34.8–46.1)
HGB BLD-MCNC: 11.7 G/DL (ref 11.5–15.4)
MCH RBC QN AUTO: 27.9 PG (ref 26.8–34.3)
MCHC RBC AUTO-ENTMCNC: 33.1 G/DL (ref 31.4–37.4)
MCV RBC AUTO: 84 FL (ref 82–98)
PLATELET # BLD AUTO: 251 THOUSANDS/UL (ref 149–390)
PMV BLD AUTO: 11.9 FL (ref 8.9–12.7)
POTASSIUM SERPL-SCNC: 3.6 MMOL/L (ref 3.5–5.3)
PROT SERPL-MCNC: 5.9 G/DL (ref 6.4–8.4)
RBC # BLD AUTO: 4.19 MILLION/UL (ref 3.81–5.12)
SODIUM SERPL-SCNC: 134 MMOL/L (ref 135–147)
VALPROATE SERPL-MCNC: 104 UG/ML (ref 50–100)
WBC # BLD AUTO: 3.85 THOUSAND/UL (ref 4.31–10.16)

## 2024-02-12 PROCEDURE — 80164 ASSAY DIPROPYLACETIC ACD TOT: CPT

## 2024-02-12 PROCEDURE — 85027 COMPLETE CBC AUTOMATED: CPT | Performed by: STUDENT IN AN ORGANIZED HEALTH CARE EDUCATION/TRAINING PROGRAM

## 2024-02-12 PROCEDURE — 99233 SBSQ HOSP IP/OBS HIGH 50: CPT | Performed by: STUDENT IN AN ORGANIZED HEALTH CARE EDUCATION/TRAINING PROGRAM

## 2024-02-12 PROCEDURE — 80053 COMPREHEN METABOLIC PANEL: CPT

## 2024-02-12 PROCEDURE — 95715 VEEG EA 12-26HR INTMT MNTR: CPT

## 2024-02-12 RX ORDER — CYANOCOBALAMIN 1000 UG/ML
1000 INJECTION, SOLUTION INTRAMUSCULAR; SUBCUTANEOUS
Qty: 5 ML | Refills: 0 | Status: DISCONTINUED | OUTPATIENT
Start: 2024-03-04 | End: 2024-02-15 | Stop reason: HOSPADM

## 2024-02-12 RX ORDER — CYANOCOBALAMIN 1000 UG/ML
1000 INJECTION, SOLUTION INTRAMUSCULAR; SUBCUTANEOUS WEEKLY
Qty: 3 ML | Refills: 0 | Status: DISCONTINUED | OUTPATIENT
Start: 2024-02-12 | End: 2024-02-15 | Stop reason: HOSPADM

## 2024-02-12 RX ADMIN — LEVETIRACETAM 1500 MG: 750 TABLET, FILM COATED ORAL at 09:06

## 2024-02-12 RX ADMIN — CARBIDOPA AND LEVODOPA 3 TABLET: 25; 100 TABLET ORAL at 13:28

## 2024-02-12 RX ADMIN — DIVALPROEX SODIUM 500 MG: 500 TABLET, EXTENDED RELEASE ORAL at 21:10

## 2024-02-12 RX ADMIN — LEVETIRACETAM 1500 MG: 750 TABLET, FILM COATED ORAL at 21:09

## 2024-02-12 RX ADMIN — POTASSIUM CHLORIDE 20 MEQ: 1500 TABLET, EXTENDED RELEASE ORAL at 09:06

## 2024-02-12 RX ADMIN — CARBIDOPA AND LEVODOPA 3 TABLET: 25; 100 TABLET ORAL at 09:05

## 2024-02-12 RX ADMIN — EZETIMIBE 10 MG: 10 TABLET ORAL at 21:10

## 2024-02-12 RX ADMIN — APIXABAN 5 MG: 5 TABLET, FILM COATED ORAL at 17:11

## 2024-02-12 RX ADMIN — DIVALPROEX SODIUM 500 MG: 500 TABLET, EXTENDED RELEASE ORAL at 09:06

## 2024-02-12 RX ADMIN — Medication 1000 UNITS: at 09:06

## 2024-02-12 RX ADMIN — CARBIDOPA AND LEVODOPA 3 TABLET: 25; 100 TABLET ORAL at 21:10

## 2024-02-12 RX ADMIN — TRIAMTERENE AND HYDROCHLOROTHIAZIDE 1 TABLET: 37.5; 25 TABLET ORAL at 09:12

## 2024-02-12 RX ADMIN — CYANOCOBALAMIN 1000 MCG: 1000 INJECTION, SOLUTION INTRAMUSCULAR at 16:01

## 2024-02-12 RX ADMIN — APIXABAN 5 MG: 5 TABLET, FILM COATED ORAL at 09:05

## 2024-02-12 RX ADMIN — CARBIDOPA AND LEVODOPA 3 TABLET: 25; 100 TABLET ORAL at 17:11

## 2024-02-12 NOTE — PLAN OF CARE
Problem: Prexisting or High Potential for Compromised Skin Integrity  Goal: Skin integrity is maintained or improved  Description: INTERVENTIONS:  - Identify patients at risk for skin breakdown  - Assess and monitor skin integrity  - Assess and monitor nutrition and hydration status  - Monitor labs   - Assess for incontinence   - Turn and reposition patient  - Assist with mobility/ambulation  - Relieve pressure over bony prominences  - Avoid friction and shearing  - Provide appropriate hygiene as needed including keeping skin clean and dry  - Evaluate need for skin moisturizer/barrier cream  - Collaborate with interdisciplinary team   - Patient/family teaching  - Consider wound care consult   Outcome: Progressing     Problem: PAIN - ADULT  Goal: Verbalizes/displays adequate comfort level or baseline comfort level  Description: Interventions:  - Encourage patient to monitor pain and request assistance  - Assess pain using appropriate pain scale  - Administer analgesics based on type and severity of pain and evaluate response  - Implement non-pharmacological measures as appropriate and evaluate response  - Consider cultural and social influences on pain and pain management  - Notify physician/advanced practitioner if interventions unsuccessful or patient reports new pain  Outcome: Progressing     Problem: INFECTION - ADULT  Goal: Absence or prevention of progression during hospitalization  Description: INTERVENTIONS:  - Assess and monitor for signs and symptoms of infection  - Monitor lab/diagnostic results  - Monitor all insertion sites, i.e. indwelling lines, tubes, and drains  - Monitor endotracheal if appropriate and nasal secretions for changes in amount and color  - [place] appropriate cooling/warming therapies per order  - Administer medications as ordered  - Instruct and encourage patient and family to use good hand hygiene technique  - Identify and instruct in appropriate isolation precautions for  identified infection/condition  Outcome: Progressing     Problem: SAFETY ADULT  Goal: Patient will remain free of falls  Description: INTERVENTIONS:  - Educate patient/family on patient safety including physical limitations  - Instruct patient to call for assistance with activity   - Consult OT/PT to assist with strengthening/mobility   - Keep Call bell within reach  - Keep bed low and locked with side rails adjusted as appropriate  - Keep care items and personal belongings within reach  - Initiate and maintain comfort rounds  - Make Fall Risk Sign visible to staff  - Apply yellow socks and bracelet for high fall risk patients  - Consider moving patient to room near nurses station  Outcome: Progressing     Problem: Knowledge Deficit  Goal: Patient/family/caregiver demonstrates understanding of disease process, treatment plan, medications, and discharge instructions  Description: Complete learning assessment and assess knowledge base.  Interventions:  - Provide teaching at level of understanding  - Provide teaching via preferred learning methods  Outcome: Progressing     Problem: NEUROSENSORY - ADULT  Goal: Achieves stable or improved neurological status  Description: INTERVENTIONS  - Monitor and report changes in neurological status  - Monitor vital signs such as temperature, blood pressure, glucose, and any other labs ordered   - Initiate measures to prevent increased intracranial pressure  - Monitor for seizure activity and implement precautions if appropriate      Outcome: Progressing

## 2024-02-12 NOTE — PROGRESS NOTES
NEUROLOGY RESIDENCY PROGRESS NOTE     Name: Kamilla Pat   Age & Sex: 78 y.o. female   MRN: [id number]  Unit/Bed#: [facility] 717-01   Encounter: [id number]    Kamilla Pat will need follow up in in 6 weeks with epilepsy attending/AP .  She will not require outpatient neurological testing.      Pending for discharge: vEEG     ASSESSMENT & PLAN     * Seizure-like activity (HCC)  Assessment & Plan  Assessment:  Patient is a 78-year-old right-handed female with past medical history of Parkinson disease, seizure-like activity, hypercholesterolemia, CKD stage III, history of multiple subsegmental pulmonary emoli that presented to [facility] on 2024 for seizure-like activity.  Patient was discharged the same day, and shortly after discharge patient was reported to have had another episode of seizure described as jaw clenching, then subsequent generalization to involve whole body tonic-clonic activity, lasting 5 minutes.  Transferred to [facility] for spell characterization.    Workup:  Lab Results   Component Value Date    AST 4 (L) 2024    ALT <3 (L) 2024    TBILI 0.89 2024    LACTICACID 1.6 2024    LEVETIRACETA 19.5 2024     Blood glucose: 132  CMP/ca: Na 134, CO2 9, K 3.1, Tbil 1.28  Ma.1  Lactic acid: 11.6 --> 1.8  Urine Tox: WNL  UA Microscopic: WNL  CT head: Stable findings without acute intracranial abnormality.  MRI Brain w wo contrast: Stable MRI of the brain with no acute intracranial abnormality. 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   Routine EEG: This Routine EEG recorded during wakefulness, drowsiness, and sleep is abnormal. Background activities and posteriorly dominant rhythm are mildly too slow, suggesting mild diffuse cerebral dysfunction of nonspecific etiology.   vEE24-24 results show background disorganized alpha-theta and delta activity. No epileptiform  "discharges  Depakote level for this morning = 104      Impression:  Unclear reasoning as to why patient has been experiencing increasing seizure episodes.  Further evaluation with MRI demonstrated no acute intracranial pathology, and EEG demonstrated findings consistent with mild cerebral diffuse dysfunction of nonspecific etiology. Video EEG monitoring for further spell characterization preliminary results show \"Background disorganized alpha-theta and delta activity. No epileptiform discharges\". Etiology possibly secondary to patient's increased Sinemet dosing/Levodopa-induced dyskinesia.       Plan:  Case discussed with attending neurologist Dr. Galloway   Maintain normotension, normothermia, euglycemia  Recommend continuing levetiracetam 1500 mg q12, Depakote 500 mg q12 at discharge   As outpatient, check valproate level in 3 days   Counseled to keep log of when she takes medications and to note timing of symptoms   [facility] completed at previous campus  Continue to evaluate and treat any infectious, inflammatory, metabolic derangements  Continue PT/OT  Continue seizure precautions  Give Vit B12 injection x 3 days, then weekly. Also, recommend daily 1000 vit B12 oral supplement after injections.   DVT prophylaxis per primary team  Recommend 1 mg of intravenous lorazepam for generalized tonic-clonic seizure only lasting more than 3 minutes, please contact neurology  Recommend noncontrast CT head, STAT, to be completed for any acute change in mental status/neurologic examination, please contact neurology  Rest per primary team appreciated  Follow up with Neuro at [facility] - reports has appointment on 2/29    Parkinson disease  Assessment & Plan  Recommend continuing carbidopa levodopa home dosing.              SUBJECTIVE     Patient was seen and examined. No acute events overnight. Reports feeling well. Denies seizure-like episodes overnight.     Pertinent Negatives include: numbness, weakness, speech or visual changes, " headaches, seizures, paralysis/weakness, numbness or tingling, involuntary movements, tremor     Review of Systems   Constitutional:  Negative for chills, diaphoresis, fatigue and fever.   HENT:  Negative for ear pain and sore throat.    Eyes:  Negative for pain and visual disturbance.   Respiratory:  Negative for cough, chest tightness and shortness of breath.    Cardiovascular:  Negative for chest pain, palpitations and leg swelling.   Gastrointestinal:  Negative for abdominal pain, diarrhea, nausea and vomiting.   Genitourinary:  Negative for dysuria and hematuria.   Musculoskeletal:  Negative for arthralgias and back pain.   Skin:  Negative for color change and rash.   Neurological:  Negative for dizziness, tremors, seizures, syncope, facial asymmetry, speech difficulty, weakness, light-headedness, numbness and headaches.   Psychiatric/Behavioral:  Negative for confusion and sleep disturbance.    All other systems reviewed and are negative.      OBJECTIVE     Patient ID: Kamilla Pat is a 78 y.o. female.    Vitals:    24 1625 24 2100 24 0109 24 0914   BP: 96/61 118/64 116/64 136/80   BP Location: Right arm      Pulse:  84 84 93   Temp: 97.7 °F (36.5 °C) 97.7 °F (36.5 °C)  97.9 °F (36.6 °C)   SpO2:  93% 96% 90%      Temperature:   Temp (24hrs), Av.8 °F (36.6 °C), Min:97.7 °F (36.5 °C), Max:97.9 °F (36.6 °C)    Temperature: 97.9 °F (36.6 °C)      Physical Exam  Vitals reviewed.   Constitutional:       Appearance: Normal appearance. She is normal weight. She is not ill-appearing or diaphoretic.   HENT:      Head: Normocephalic and atraumatic.      Mouth/Throat:      Mouth: Mucous membranes are moist.      Pharynx: Oropharynx is clear.   Eyes:      General: No scleral icterus.     Extraocular Movements: EOM normal.      Conjunctiva/sclera: Conjunctivae normal.   Cardiovascular:      Rate and Rhythm: Normal rate and regular rhythm.      Pulses: Normal pulses.      Heart sounds: Normal  heart sounds. No murmur heard.     No gallop.   Pulmonary:      Effort: Pulmonary effort is normal. No respiratory distress.      Breath sounds: Normal breath sounds. No wheezing or rales.   Abdominal:      General: Bowel sounds are normal. There is no distension.      Palpations: Abdomen is soft. There is no mass.      Tenderness: There is no abdominal tenderness.   Musculoskeletal:         General: Normal range of motion.      Cervical back: Normal range of motion and neck supple.      Right lower leg: No edema.      Left lower leg: No edema.   Skin:     General: Skin is warm and dry.      Capillary Refill: Capillary refill takes less than 2 seconds.      Coloration: Skin is not jaundiced or pale.   Neurological:      General: No focal deficit present.      Mental Status: She is alert and oriented to person, place, and time. Mental status is at baseline.      Cranial Nerves: Cranial nerves 2-12 are intact.      Sensory: No sensory deficit.      Motor: Motor strength is normal.No weakness.      Deep Tendon Reflexes:      Reflex Scores:       Bicep reflexes are 2+ on the right side and 2+ on the left side.  Psychiatric:         Mood and Affect: Mood normal.         Speech: Speech normal.         Behavior: Behavior normal.         Thought Content: Thought content normal.         Judgment: Judgment normal.          Neurologic Exam     Mental Status   Oriented to person, place, and time.   Oriented to person.   Oriented to place. Disoriented to country and city.   Oriented to year, month and date.   Attention: normal. Concentration: normal.   Speech: speech is normal   Level of consciousness: alert    Cranial Nerves   Cranial nerves II through XII intact.     CN II   Right visual field deficit: none  Left visual field deficit: none     CN III, IV, VI   Extraocular motions are normal.   Right pupil: Shape: regular.   Left pupil: Shape: regular.     CN V   Facial sensation intact.     CN VII   Facial expression full,  symmetric.   Right facial weakness: none  Left facial weakness: none    CN VIII   CN VIII normal.     CN XI   CN XI normal.     CN XII   CN XII normal.   Fasciculations: absent  Tongue deviation: none    Motor Exam   Overall muscle tone: normal  Right arm pronator drift: absent  Left arm pronator drift: absent    Strength   Strength 5/5 throughout.     Sensory Exam   Light touch normal.     Gait, Coordination, and Reflexes     Tremor   Resting tremor: absent  Intention tremor: absent    Reflexes   Right biceps: 2+  Left biceps: 2+         LABORATORY DATA     Labs: I have personally reviewed pertinent reports.    Results from last 7 days   Lab Units 02/12/24  0435 02/10/24  0516 02/09/24  0630 02/08/24  1535 02/07/24  0441 02/06/24  2107   WBC Thousand/uL 3.85* 5.76 4.21* 10.98*   < > 6.44   HEMOGLOBIN g/dL 11.7 10.6* 10.2* 12.3   < > 12.5   HEMATOCRIT % 35.3 31.9* 31.5* 39.8   < > 39.8   PLATELETS Thousands/uL 251 182 176 277   < > 257   NEUTROS PCT %  --   --  72 79*  --  78*   MONOS PCT %  --   --  11 8  --  6   EOS PCT %  --   --  2 1  --  0    < > = values in this interval not displayed.      Results from last 7 days   Lab Units 02/12/24  0522 02/11/24  0300 02/10/24  0516 02/08/24  1842 02/08/24  1535 02/07/24  0441 02/06/24  2110   SODIUM mmol/L 134* 131* 130*   < > 136   < > 134*   POTASSIUM mmol/L 3.6 3.3* 3.4*   < > 3.4*   < > 3.1*   CHLORIDE mmol/L 100 94* 97   < > 101   < > 98   CO2 mmol/L 22 25 22   < > 10*   < > 9*   BUN mg/dL 11 9 5   < > 8   < > 10   CREATININE mg/dL 0.60 0.78 0.66   < > 0.87   < > 0.88   CALCIUM mg/dL 8.4 9.2 8.7   < > 9.6   < > 9.5   ALK PHOS U/L 70  --   --   --  87  --  92   ALT U/L <3*  --   --   --  <3*  --  <3*   AST U/L 4*  --   --   --  6*  --  7*    < > = values in this interval not displayed.     Results from last 7 days   Lab Units 02/09/24  0630 02/06/24  2110   MAGNESIUM mg/dL 2.0 2.1     Results from last 7 days   Lab Units 02/06/24  2110   PHOSPHORUS mg/dL 3.9       Results from last 7 days   Lab Units 02/08/24  1535 02/06/24  2107   INR  1.26* 1.04   PTT seconds 23 24     Results from last 7 days   Lab Units 02/06/24  2340   LACTIC ACID mmol/L 1.6           IMAGING & DIAGNOSTIC TESTING     Radiology Results: I have personally reviewed pertinent reports.      02/06/2024 - CT BRAIN - WITHOUT CONTRAST   IMPRESSION:   Stable findings without acute intracranial abnormality.     02/07/2024 - MRI BRAIN WITH AND WITHOUT CONTRAST   IMPRESSION:  Stable MRI of the brain with no acute intracranial abnormality. 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    02/11/2024 - EEG Video monitoring 24 hrs   Impression:   Pending       Other Diagnostic Testing: I have personally reviewed pertinent reports.      ACTIVE MEDICATIONS     Current Facility-Administered Medications   Medication Dose Route Frequency    apixaban (ELIQUIS) tablet 5 mg  5 mg Oral BID    carbidopa-levodopa (SINEMET)  mg per tablet 3 tablet  3 tablet Oral 4x Daily    cholecalciferol (VITAMIN D3) tablet 1,000 Units  1,000 Units Oral Daily    cyanocobalamin injection 1,000 mcg  1,000 mcg Intramuscular Weekly    Followed by    [START ON 3/4/2024] cyanocobalamin injection 1,000 mcg  1,000 mcg Intramuscular Q30 Days    divalproex sodium (DEPAKOTE ER) 24 hr tablet 500 mg  500 mg Oral Q12H    ezetimibe (ZETIA) tablet 10 mg  10 mg Oral HS    levETIRAcetam (KEPPRA) tablet 1,500 mg  1,500 mg Oral Q12H CESAR    LORazepam (ATIVAN) injection 1 mg  1 mg Intravenous Q6H PRN    potassium chloride (Klor-Con M20) CR tablet 20 mEq  20 mEq Oral Daily    triamterene-hydrochlorothiazide (MAXZIDE-25) 37.5-25 mg per tablet 1 tablet  1 tablet Oral Daily       Prior to Admission medications    Medication Sig Start Date End Date Taking? Authorizing Provider   acetaminophen (TYLENOL) 325 mg tablet Take 325 mg by mouth every 6 (six) hours as needed for mild pain    Historical Provider, MD   apixaban (Eliquis)  5 mg Take 2 tablets (10 mg total) by mouth 2 (two) times a day for 7 days, THEN 1 tablet (5 mg total) 2 (two) times a day for 21 days. 1/12/24 2/9/24  Jonathan Jenkins MD   Calcium Carbonate-Vitamin D (CALCIUM-CARB 600 + D PO) Take 1 tablet by mouth daily    Historical Provider, MD   carbidopa-levodopa (SINEMET)  mg per tablet Take 3 tablets by mouth 4 (four) times a day Please continue to take Sinemet as per your neurologist's instruction. 2/8/24 3/9/24  Ruperto Sexton MD   Cholecalciferol 50 MCG (2000 UT) CAPS Take 1 capsule by mouth daily    Historical Provider, MD   Diclofenac Sodium (VOLTAREN) 1 % Apply 2 g topically 4 (four) times a day 8/15/23   [facility] Cindi Christianson MD   ezetimibe (ZETIA) 10 mg tablet Take by mouth Daily 4/1/19   Historical Provider, MD   levETIRAcetam (KEPPRA) 1000 MG tablet Take 1 tablet (1,000 mg total) by mouth every 12 (twelve) hours 2/8/24 3/9/24  Ruperto Sexton MD   potassium chloride (MICRO-K) 10 MEQ CR capsule Take 20 mEq by mouth Daily Per [facility] note from Nahomi Sandoval,  - 12/19/2023 10:10 AM EST 20 meq.  4/1/19   Historical Provider, MD   triamterene-hydrochlorothiazide (DYAZIDE) 37.5-25 mg per capsule Take 1 capsule by mouth Daily 4/1/19   Historical Provider, MD         VTE Pharmacologic Prophylaxis: Apixaban (Eliquis)  VTE Mechanical Prophylaxis: sequential compression device    ======    I have discussed the patient's history, physical exam findings, assessment, and plan in detail with attending, Dr. Lisy Galloway MD    Thank you for allowing me to participate in the care of your patient, Kamilla Pat.    Srikanth Flowers MD  [facility] Internal Medicine Residency, PGY-3

## 2024-02-12 NOTE — ASSESSMENT & PLAN NOTE
Lab Results   Component Value Date    EGFR 88 02/12/2024    EGFR 74 02/11/2024    EGFR 86 02/10/2024    CREATININE 0.60 02/12/2024    CREATININE 0.78 02/11/2024    CREATININE 0.66 02/10/2024     Creatinine at baseline.  Avoid nephrotoxins and hypotension.

## 2024-02-12 NOTE — PLAN OF CARE
Problem: Prexisting or High Potential for Compromised Skin Integrity  Goal: Skin integrity is maintained or improved  Description: INTERVENTIONS:  - Identify patients at risk for skin breakdown  - Assess and monitor skin integrity  - Assess and monitor nutrition and hydration status  - Monitor labs   - Assess for incontinence   - Turn and reposition patient  - Assist with mobility/ambulation  - Relieve pressure over bony prominences  - Avoid friction and shearing  - Provide appropriate hygiene as needed including keeping skin clean and dry  - Evaluate need for skin moisturizer/barrier cream  - Collaborate with interdisciplinary team   - Patient/family teaching  - Consider wound care consult   Outcome: Progressing     Problem: PAIN - ADULT  Goal: Verbalizes/displays adequate comfort level or baseline comfort level  Description: Interventions:  - Encourage patient to monitor pain and request assistance  - Assess pain using appropriate pain scale  - Administer analgesics based on type and severity of pain and evaluate response  - Implement non-pharmacological measures as appropriate and evaluate response  - Consider cultural and social influences on pain and pain management  - Notify physician/advanced practitioner if interventions unsuccessful or patient reports new pain  Outcome: Progressing     Problem: INFECTION - ADULT  Goal: Absence or prevention of progression during hospitalization  Description: INTERVENTIONS:  - Assess and monitor for signs and symptoms of infection  - Monitor lab/diagnostic results  - Monitor all insertion sites, i.e. indwelling lines, tubes, and drains  - Monitor endotracheal if appropriate and nasal secretions for changes in amount and color  - [place] appropriate cooling/warming therapies per order  - Administer medications as ordered  - Instruct and encourage patient and family to use good hand hygiene technique  - Identify and instruct in appropriate isolation precautions for  identified infection/condition  Outcome: Progressing  Goal: Absence of fever/infection during neutropenic period  Description: INTERVENTIONS:  - Monitor WBC    Outcome: Progressing     Problem: SAFETY ADULT  Goal: Patient will remain free of falls  Description: INTERVENTIONS:  - Educate patient/family on patient safety including physical limitations  - Instruct patient to call for assistance with activity   - Consult OT/PT to assist with strengthening/mobility   - Keep Call bell within reach  - Keep bed low and locked with side rails adjusted as appropriate  - Keep care items and personal belongings within reach  - Initiate and maintain comfort rounds  - Make Fall Risk Sign visible to staff  - Offer Toileting every 2 Hours, in advance of need  - Initiate/Maintain bed alarm  - Obtain necessary fall risk management equipment: Yellow socks  - Apply yellow socks and bracelet for high fall risk patients  - Consider moving patient to room near nurses station  Outcome: Progressing  Goal: Maintain or return to baseline ADL function  Description: INTERVENTIONS:  -  Assess patient's ability to carry out ADLs; assess patient's baseline for ADL function and identify physical deficits which impact ability to perform ADLs (bathing, care of mouth/teeth, toileting, grooming, dressing, etc.)  - Assess/evaluate cause of self-care deficits   - Assess range of motion  - Assess patient's mobility; develop plan if impaired  - Assess patient's need for assistive devices and provide as appropriate  - Encourage maximum independence but intervene and supervise when necessary  - Involve family in performance of ADLs  - Assess for home care needs following discharge   - Consider OT consult to assist with ADL evaluation and planning for discharge  - Provide patient education as appropriate  Outcome: Progressing  Goal: Maintains/Returns to pre admission functional level  Description: INTERVENTIONS:  - Perform AM-PAC 6 Click Basic Mobility/ Daily  Activity assessment daily.  - Set and communicate daily mobility goal to care team and patient/family/caregiver.   - Collaborate with rehabilitation services on mobility goals if consulted  - Perform Range of Motion 3 times a day.  - Reposition patient every 2 hours.  - Dangle patient 3 times a day  - Stand patient 3 times a day  - Ambulate patient 3 times a day  - Out of bed to chair 3 times a day   - Out of bed for meals 3 times a day  - Out of bed for toileting  - Record patient progress and toleration of activity level   Outcome: Progressing     Problem: DISCHARGE PLANNING  Goal: Discharge to home or other facility with appropriate resources  Description: INTERVENTIONS:  - Identify barriers to discharge w/patient and caregiver  - Arrange for needed discharge resources and transportation as appropriate  - Identify discharge learning needs (meds, wound care, etc.)  - Arrange for interpretive services to assist at discharge as needed  - Refer to Case Management Department for coordinating discharge planning if the patient needs post-hospital services based on physician/advanced practitioner order or complex needs related to functional status, cognitive ability, or social support system  Outcome: Progressing     Problem: Knowledge Deficit  Goal: Patient/family/caregiver demonstrates understanding of disease process, treatment plan, medications, and discharge instructions  Description: Complete learning assessment and assess knowledge base.  Interventions:  - Provide teaching at level of understanding  - Provide teaching via preferred learning methods  Outcome: Progressing     Problem: NEUROSENSORY - ADULT  Goal: Achieves stable or improved neurological status  Description: INTERVENTIONS  - Monitor and report changes in neurological status  - Monitor vital signs such as temperature, blood pressure, glucose, and any other labs ordered   - Initiate measures to prevent increased intracranial pressure  - Monitor for  seizure activity and implement precautions if appropriate      Outcome: Progressing  Goal: Remains free of injury related to seizures activity  Description: INTERVENTIONS  - Maintain airway, patient safety  and administer oxygen as ordered  - Monitor patient for seizure activity, document and report duration and description of seizure to physician/advanced practitioner  - If seizure occurs,  ensure patient safety during seizure  - Reorient patient post seizure  - Seizure pads on all 4 side rails  - Instruct patient/family to notify RN of any seizure activity including if an aura is experienced  - Instruct patient/family to call for assistance with activity based on nursing assessment  - Administer anti-seizure medications if ordered    Outcome: Progressing  Goal: Achieves maximal functionality and self care  Description: INTERVENTIONS  - Monitor swallowing and airway patency with patient fatigue and changes in neurological status  - Encourage and assist patient to increase activity and self care.   - Encourage visually impaired, hearing impaired and aphasic patients to use assistive/communication devices  Outcome: Progressing

## 2024-02-12 NOTE — ASSESSMENT & PLAN NOTE
Patient was recently admitted from 2/6 to 2/8 for seizure-like activity as reported generalized shaking and jerking with loss of consciousness per family.    MRI brain on that admission was unremarkable.  Patient evaluated by neurology and was discharged on Keppra however and route to home she had another episode hence presented again to [facility].  Keppra was increased from 750 to 1000 mg twice daily.  Case was discussed with neurology and recommended transfer to [facility] for video EEG.  Continue Ativan as needed.  Started on seizure prophylaxis.  Continue to follow, appreciate neurology recs

## 2024-02-12 NOTE — PROGRESS NOTES
[facility]  Progress Note  Name: Kamilla Pat I  MRN: [id number]  Unit/Bed#: [facility] 717-01 I Date of Admission: 2/11/2024   Date of Service: 2/12/2024 I Hospital Day: 1    Assessment/Plan   CKD (chronic kidney disease) stage 2, GFR 60-89 ml/min  Assessment & Plan  Lab Results   Component Value Date    EGFR 88 02/12/2024    EGFR 74 02/11/2024    EGFR 86 02/10/2024    CREATININE 0.60 02/12/2024    CREATININE 0.78 02/11/2024    CREATININE 0.66 02/10/2024     Creatinine at baseline.  Avoid nephrotoxins and hypotension.    History of pulmonary embolism  Assessment & Plan  Diagnosed with PE on 1/11/2024.  Currently on Eliquis 5 mg twice a day.  As per her previous discharge summary she has to be on Eliquis for total 6 months.  Continue Eliquis 5 mg twice daily    Pure hypercholesterolemia  Assessment & Plan  Continue Zetia at home dosage.    Parkinson disease  Assessment & Plan  Currently taking Sinemet immediate release 3 tablets 4 times daily.  Patient was seen by movement disorder specialist in the month of December 2023 and increase the dose.  The first-time seizure that she had was in December/2023.  Patient does not recall if the seizure onset is prior to or after the dose adjustment.  She has been feeling restless, agitated, anxious, and having insomnia.  Suspecting increase in dopamine levels.      Plan:  Continue home dosage Sinemet    * Seizure-like activity (HCC)  Assessment & Plan  Patient was recently admitted from 2/6 to 2/8 for seizure-like activity as reported generalized shaking and jerking with loss of consciousness per family.    MRI brain on that admission was unremarkable.  Patient evaluated by neurology and was discharged on Keppra however and route to home she had another episode hence presented again to [facility].  Keppra was increased from 750 to 1000 mg twice daily.  Case was discussed with neurology and recommended transfer to [facility] for  video EEG.  Continue Ativan as needed.  Started on seizure prophylaxis.  Continue to follow, appreciate neurology recs           VTE Pharmacologic Prophylaxis:   Moderate Risk (Score 3-4) - Pharmacological DVT Prophylaxis Ordered: apixaban (Eliquis).    Mobility:   Basic Mobility Inpatient Raw Score: 10  -HLM Goal: 4: Move to chair/commode  -HLM Achieved: 2: Bed activities/Dependent transfer  HLM Goal NOT achieved. Continue with multidisciplinary rounding and encourage appropriate mobility to improve upon HLM goals.    Patient Centered Rounds: I performed bedside rounds with nursing staff today.   Discussions with Specialists or Other Care Team Provider: N/a    Education and Discussions with Family / Patient: Updated  () at bedside.    Total Time Spent on Date of Encounter in care of patient: 45 mins. This time was spent on one or more of the following: performing physical exam; counseling and coordination of care; obtaining or reviewing history; documenting in the medical record; reviewing/ordering tests, medications or procedures; communicating with other healthcare professionals and discussing with patient's family/caregivers.    Current Length of Stay: 1 day(s)  Current Patient Status: Inpatient   Certification Statement: The patient will continue to require additional inpatient hospital stay due to Seizure like activity  Discharge Plan: Anticipate discharge in 24-48 hrs to home.    Code Status: Level 1 - Full Code    Subjective:   Seen and examined at bedside, patient resting comfortably with  at bedside.  Denies any overnight issues.    Objective:     Vitals:   Temp (24hrs), Av.9 °F (36.6 °C), Min:97.7 °F (36.5 °C), Max:98.2 °F (36.8 °C)    Temp:  [97.7 °F (36.5 °C)-98.2 °F (36.8 °C)] 97.9 °F (36.6 °C)  HR:  [84-98] 95  Resp:  [14-16] 16  BP: (116-136)/(64-80) 122/73  SpO2:  [90 %-96 %] 92 %  There is no height or weight on file to calculate BMI.     Input and Output Summary  (last 24 hours):     Intake/Output Summary (Last 24 hours) at 2/12/2024 1714  Last data filed at 2/12/2024 1701  Gross per 24 hour   Intake 240 ml   Output --   Net 240 ml       Physical Exam:   Physical Exam  Vitals reviewed.   Constitutional:       General: She is not in acute distress.     Appearance: She is not ill-appearing.   HENT:      Head: Normocephalic.      Mouth/Throat:      Mouth: Mucous membranes are moist.   Eyes:      General: No scleral icterus.  Cardiovascular:      Rate and Rhythm: Normal rate and regular rhythm.      Pulses: Normal pulses.      Heart sounds: No murmur heard.     No gallop.   Pulmonary:      Effort: Pulmonary effort is normal. No respiratory distress.      Breath sounds: No wheezing, rhonchi or rales.   Abdominal:      General: Abdomen is flat. Bowel sounds are normal. There is no distension.      Palpations: Abdomen is soft.      Tenderness: There is no abdominal tenderness. There is no guarding or rebound.   Musculoskeletal:      Right lower leg: No edema.      Left lower leg: No edema.   Skin:     General: Skin is warm.      Capillary Refill: Capillary refill takes less than 2 seconds.      Coloration: Skin is not jaundiced.      Findings: No rash.   Neurological:      General: No focal deficit present.      Mental Status: She is alert and oriented to person, place, and time.      Sensory: No sensory deficit.      Motor: No weakness.   Psychiatric:         Mood and Affect: Mood normal.         Behavior: Behavior normal.          Additional Data:     Labs:  Results from last 7 days   Lab Units 02/12/24  0435 02/10/24  0516 02/09/24  0630   WBC Thousand/uL 3.85*   < > 4.21*   HEMOGLOBIN g/dL 11.7   < > 10.2*   HEMATOCRIT % 35.3   < > 31.5*   PLATELETS Thousands/uL 251   < > 176   NEUTROS PCT %  --   --  72   LYMPHS PCT %  --   --  13*   MONOS PCT %  --   --  11   EOS PCT %  --   --  2    < > = values in this interval not displayed.     Results from last 7 days   Lab Units  02/12/24  0522   SODIUM mmol/L 134*   POTASSIUM mmol/L 3.6   CHLORIDE mmol/L 100   CO2 mmol/L 22   BUN mg/dL 11   CREATININE mg/dL 0.60   ANION GAP mmol/L 12   CALCIUM mg/dL 8.4   ALBUMIN g/dL 3.6   TOTAL BILIRUBIN mg/dL 0.89   ALK PHOS U/L 70   ALT U/L <3*   AST U/L 4*   GLUCOSE RANDOM mg/dL 81     Results from last 7 days   Lab Units 02/08/24  1535   INR  1.26*     Results from last 7 days   Lab Units 02/06/24  2034   POC GLUCOSE mg/dl 132     Results from last 7 days   Lab Units 02/08/24  0450   HEMOGLOBIN A1C % 5.2     Results from last 7 days   Lab Units 02/06/24  2340 02/06/24  2107   LACTIC ACID mmol/L 1.6 11.8*       Lines/Drains:  Invasive Devices       Peripheral Intravenous Line  Duration             Peripheral IV 02/12/24 Left;Proximal;Ventral (anterior) Forearm <1 day                    Imaging: No pertinent imaging reviewed.    Recent Cultures (last 7 days):         Last 24 Hours Medication List:   Current Facility-Administered Medications   Medication Dose Route Frequency Provider Last Rate    apixaban  5 mg Oral BID Ching Clements MD      carbidopa-levodopa  3 tablet Oral 4x Daily Ching Clements MD      cholecalciferol  1,000 Units Oral Daily Ching Clements MD      cyanocobalamin  1,000 mcg Intramuscular Weekly Srikanth Flowers MD      Followed by    [START ON 3/4/2024] cyanocobalamin  1,000 mcg Intramuscular Q30 Days Srikanth lFowers MD      divalproex sodium  500 mg Oral Q12H Ching Clements MD      ezetimibe  10 mg Oral HS Ching Clements MD      levETIRAcetam  1,500 mg Oral Q12H CESAR Ching Clements MD      LORazepam  1 mg Intravenous Q6H PRN Ching Clements MD      potassium chloride  20 mEq Oral Daily Ching Clements MD      triamterene-hydrochlorothiazide  1 tablet Oral Daily Ching Clements MD          Today, Patient Was Seen By: Micky Babb    **Please Note: This note may have been constructed using a voice recognition system.**

## 2024-02-13 PROCEDURE — 99232 SBSQ HOSP IP/OBS MODERATE 35: CPT | Performed by: FAMILY MEDICINE

## 2024-02-13 PROCEDURE — 99232 SBSQ HOSP IP/OBS MODERATE 35: CPT | Performed by: STUDENT IN AN ORGANIZED HEALTH CARE EDUCATION/TRAINING PROGRAM

## 2024-02-13 RX ADMIN — APIXABAN 5 MG: 5 TABLET, FILM COATED ORAL at 08:49

## 2024-02-13 RX ADMIN — LEVETIRACETAM 1500 MG: 750 TABLET, FILM COATED ORAL at 21:50

## 2024-02-13 RX ADMIN — TRIAMTERENE AND HYDROCHLOROTHIAZIDE 1 TABLET: 37.5; 25 TABLET ORAL at 08:50

## 2024-02-13 RX ADMIN — APIXABAN 5 MG: 5 TABLET, FILM COATED ORAL at 17:07

## 2024-02-13 RX ADMIN — CARBIDOPA AND LEVODOPA 3 TABLET: 25; 100 TABLET ORAL at 21:50

## 2024-02-13 RX ADMIN — CARBIDOPA AND LEVODOPA 3 TABLET: 25; 100 TABLET ORAL at 08:49

## 2024-02-13 RX ADMIN — EZETIMIBE 10 MG: 10 TABLET ORAL at 21:50

## 2024-02-13 RX ADMIN — CARBIDOPA AND LEVODOPA 3 TABLET: 25; 100 TABLET ORAL at 12:54

## 2024-02-13 RX ADMIN — DIVALPROEX SODIUM 500 MG: 500 TABLET, EXTENDED RELEASE ORAL at 21:50

## 2024-02-13 RX ADMIN — CARBIDOPA AND LEVODOPA 3 TABLET: 25; 100 TABLET ORAL at 17:06

## 2024-02-13 RX ADMIN — POTASSIUM CHLORIDE 20 MEQ: 1500 TABLET, EXTENDED RELEASE ORAL at 08:49

## 2024-02-13 RX ADMIN — LEVETIRACETAM 1500 MG: 750 TABLET, FILM COATED ORAL at 08:49

## 2024-02-13 RX ADMIN — DIVALPROEX SODIUM 500 MG: 500 TABLET, EXTENDED RELEASE ORAL at 10:48

## 2024-02-13 RX ADMIN — Medication 1000 UNITS: at 08:49

## 2024-02-13 NOTE — PLAN OF CARE
Problem: Prexisting or High Potential for Compromised Skin Integrity  Goal: Skin integrity is maintained or improved  Description: INTERVENTIONS:  - Identify patients at risk for skin breakdown  - Assess and monitor skin integrity  - Assess and monitor nutrition and hydration status  - Monitor labs   - Assess for incontinence   - Turn and reposition patient  - Assist with mobility/ambulation  - Relieve pressure over bony prominences  - Avoid friction and shearing  - Provide appropriate hygiene as needed including keeping skin clean and dry  - Evaluate need for skin moisturizer/barrier cream  - Collaborate with interdisciplinary team   - Patient/family teaching  - Consider wound care consult   Outcome: Progressing     Problem: PAIN - ADULT  Goal: Verbalizes/displays adequate comfort level or baseline comfort level  Description: Interventions:  - Encourage patient to monitor pain and request assistance  - Assess pain using appropriate pain scale  - Administer analgesics based on type and severity of pain and evaluate response  - Implement non-pharmacological measures as appropriate and evaluate response  - Consider cultural and social influences on pain and pain management  - Notify physician/advanced practitioner if interventions unsuccessful or patient reports new pain  Outcome: Progressing     Problem: INFECTION - ADULT  Goal: Absence or prevention of progression during hospitalization  Description: INTERVENTIONS:  - Assess and monitor for signs and symptoms of infection  - Monitor lab/diagnostic results  - Monitor all insertion sites, i.e. indwelling lines, tubes, and drains  - Monitor endotracheal if appropriate and nasal secretions for changes in amount and color  - [place] appropriate cooling/warming therapies per order  - Administer medications as ordered  - Instruct and encourage patient and family to use good hand hygiene technique  - Identify and instruct in appropriate isolation precautions for  identified infection/condition  Outcome: Progressing  Goal: Absence of fever/infection during neutropenic period  Description: INTERVENTIONS:  - Monitor WBC    Outcome: Progressing     Problem: SAFETY ADULT  Goal: Patient will remain free of falls  Description: INTERVENTIONS:  - Educate patient/family on patient safety including physical limitations  - Instruct patient to call for assistance with activity   - Consult OT/PT to assist with strengthening/mobility   - Keep Call bell within reach  - Keep bed low and locked with side rails adjusted as appropriate  - Keep care items and personal belongings within reach  - Initiate and maintain comfort rounds  - Make Fall Risk Sign visible to staff  - Offer Toileting every 2 Hours, in advance of need  - Initiate/Maintain bed alarm  - Obtain necessary fall risk management equipment: Yellow socks  - Apply yellow socks and bracelet for high fall risk patients  - Consider moving patient to room near nurses station  Outcome: Progressing  Goal: Maintain or return to baseline ADL function  Description: INTERVENTIONS:  -  Assess patient's ability to carry out ADLs; assess patient's baseline for ADL function and identify physical deficits which impact ability to perform ADLs (bathing, care of mouth/teeth, toileting, grooming, dressing, etc.)  - Assess/evaluate cause of self-care deficits   - Assess range of motion  - Assess patient's mobility; develop plan if impaired  - Assess patient's need for assistive devices and provide as appropriate  - Encourage maximum independence but intervene and supervise when necessary  - Involve family in performance of ADLs  - Assess for home care needs following discharge   - Consider OT consult to assist with ADL evaluation and planning for discharge  - Provide patient education as appropriate  Outcome: Progressing  Goal: Maintains/Returns to pre admission functional level  Description: INTERVENTIONS:  - Perform AM-PAC 6 Click Basic Mobility/ Daily  Activity assessment daily.  - Set and communicate daily mobility goal to care team and patient/family/caregiver.   - Collaborate with rehabilitation services on mobility goals if consulted  - Perform Range of Motion 3 times a day.  - Reposition patient every 2 hours.  - Dangle patient 3 times a day  - Stand patient 3 times a day  - Ambulate patient 3 times a day  - Out of bed to chair 3 times a day   - Out of bed for meals 3 times a day  - Out of bed for toileting  - Record patient progress and toleration of activity level   Outcome: Progressing     Problem: DISCHARGE PLANNING  Goal: Discharge to home or other facility with appropriate resources  Description: INTERVENTIONS:  - Identify barriers to discharge w/patient and caregiver  - Arrange for needed discharge resources and transportation as appropriate  - Identify discharge learning needs (meds, wound care, etc.)  - Arrange for interpretive services to assist at discharge as needed  - Refer to Case Management Department for coordinating discharge planning if the patient needs post-hospital services based on physician/advanced practitioner order or complex needs related to functional status, cognitive ability, or social support system  Outcome: Progressing     Problem: Knowledge Deficit  Goal: Patient/family/caregiver demonstrates understanding of disease process, treatment plan, medications, and discharge instructions  Description: Complete learning assessment and assess knowledge base.  Interventions:  - Provide teaching at level of understanding  - Provide teaching via preferred learning methods  Outcome: Progressing     Problem: NEUROSENSORY - ADULT  Goal: Achieves stable or improved neurological status  Description: INTERVENTIONS  - Monitor and report changes in neurological status  - Monitor vital signs such as temperature, blood pressure, glucose, and any other labs ordered   - Initiate measures to prevent increased intracranial pressure  - Monitor for  seizure activity and implement precautions if appropriate      Outcome: Progressing  Goal: Remains free of injury related to seizures activity  Description: INTERVENTIONS  - Maintain airway, patient safety  and administer oxygen as ordered  - Monitor patient for seizure activity, document and report duration and description of seizure to physician/advanced practitioner  - If seizure occurs,  ensure patient safety during seizure  - Reorient patient post seizure  - Seizure pads on all 4 side rails  - Instruct patient/family to notify RN of any seizure activity including if an aura is experienced  - Instruct patient/family to call for assistance with activity based on nursing assessment  - Administer anti-seizure medications if ordered    Outcome: Progressing  Goal: Achieves maximal functionality and self care  Description: INTERVENTIONS  - Monitor swallowing and airway patency with patient fatigue and changes in neurological status  - Encourage and assist patient to increase activity and self care.   - Encourage visually impaired, hearing impaired and aphasic patients to use assistive/communication devices  Outcome: Progressing

## 2024-02-13 NOTE — DISCHARGE INSTR - AVS FIRST PAGE
Neurology recommendations at discharge:   Check valproate level in 3 days after your discharge date, approximately on February 16, 2024 - Your PCP clinic has been contacted to provide you with a lab order for this evaluation. Make sure to give them a call once you are home for further details about this lab order. Patient should get lab done prior to her morning dose of Depakote and can take medication after this lab has been drawn.  The reason to evaluate this medication level is to make sure it is not at toxic levels and is at therapeutic level.

## 2024-02-13 NOTE — PLAN OF CARE
Problem: Prexisting or High Potential for Compromised Skin Integrity  Goal: Skin integrity is maintained or improved  Description: INTERVENTIONS:  - Identify patients at risk for skin breakdown  - Assess and monitor skin integrity  - Assess and monitor nutrition and hydration status  - Monitor labs   - Assess for incontinence   - Turn and reposition patient  - Assist with mobility/ambulation  - Relieve pressure over bony prominences  - Avoid friction and shearing  - Provide appropriate hygiene as needed including keeping skin clean and dry  - Evaluate need for skin moisturizer/barrier cream  - Collaborate with interdisciplinary team   - Patient/family teaching  - Consider wound care consult   Outcome: Progressing     Problem: PAIN - ADULT  Goal: Verbalizes/displays adequate comfort level or baseline comfort level  Description: Interventions:  - Encourage patient to monitor pain and request assistance  - Assess pain using appropriate pain scale  - Administer analgesics based on type and severity of pain and evaluate response  - Implement non-pharmacological measures as appropriate and evaluate response  - Consider cultural and social influences on pain and pain management  - Notify physician/advanced practitioner if interventions unsuccessful or patient reports new pain  Outcome: Progressing     Problem: INFECTION - ADULT  Goal: Absence or prevention of progression during hospitalization  Description: INTERVENTIONS:  - Assess and monitor for signs and symptoms of infection  - Monitor lab/diagnostic results  - Monitor all insertion sites, i.e. indwelling lines, tubes, and drains  - Monitor endotracheal if appropriate and nasal secretions for changes in amount and color  - [place] appropriate cooling/warming therapies per order  - Administer medications as ordered  - Instruct and encourage patient and family to use good hand hygiene technique  - Identify and instruct in appropriate isolation precautions for  identified infection/condition  Outcome: Progressing  Goal: Absence of fever/infection during neutropenic period  Description: INTERVENTIONS:  - Monitor WBC    Outcome: Progressing     Problem: SAFETY ADULT  Goal: Patient will remain free of falls  Description: INTERVENTIONS:  - Educate patient/family on patient safety including physical limitations  - Instruct patient to call for assistance with activity   - Consult OT/PT to assist with strengthening/mobility   - Keep Call bell within reach  - Keep bed low and locked with side rails adjusted as appropriate  - Keep care items and personal belongings within reach  - Initiate and maintain comfort rounds  - Make Fall Risk Sign visible to staff  - Offer Toileting every 2 Hours, in advance of need  - Initiate/Maintain bed alarm  - Obtain necessary fall risk management equipment: Yellow socks  - Apply yellow socks and bracelet for high fall risk patients  - Consider moving patient to room near nurses station  Outcome: Progressing  Goal: Maintain or return to baseline ADL function  Description: INTERVENTIONS:  -  Assess patient's ability to carry out ADLs; assess patient's baseline for ADL function and identify physical deficits which impact ability to perform ADLs (bathing, care of mouth/teeth, toileting, grooming, dressing, etc.)  - Assess/evaluate cause of self-care deficits   - Assess range of motion  - Assess patient's mobility; develop plan if impaired  - Assess patient's need for assistive devices and provide as appropriate  - Encourage maximum independence but intervene and supervise when necessary  - Involve family in performance of ADLs  - Assess for home care needs following discharge   - Consider OT consult to assist with ADL evaluation and planning for discharge  - Provide patient education as appropriate  Outcome: Progressing  Goal: Maintains/Returns to pre admission functional level  Description: INTERVENTIONS:  - Perform AM-PAC 6 Click Basic Mobility/ Daily  Activity assessment daily.  - Set and communicate daily mobility goal to care team and patient/family/caregiver.   - Collaborate with rehabilitation services on mobility goals if consulted  - Perform Range of Motion 3 times a day.  - Reposition patient every 2 hours.  - Dangle patient 3 times a day  - Stand patient 3 times a day  - Ambulate patient 3 times a day  - Out of bed to chair 3 times a day   - Out of bed for meals 3 times a day  - Out of bed for toileting  - Record patient progress and toleration of activity level   Outcome: Progressing

## 2024-02-13 NOTE — CASE MANAGEMENT
Case Management Assessment    Patient name Kamilla Pat  Location [facility] 717/[facility] 717-01 MRN [id number]  : 1945 Date 2024       Current Admission Date: 2024  Current Admission Diagnosis:Seizure-like activity (HCC)   Patient Active Problem List    Diagnosis Date Noted    Anxiety 2024    History of pulmonary embolism 2024    CKD (chronic kidney disease) stage 2, GFR 60-89 ml/min 2024    Generalized weakness 2024    Seizure-like activity (HCC) 2024    Ambulatory dysfunction 2024    Multiple subsegmental pulmonary emboli without acute cor pulmonale (HCC) 2024    Pure hypercholesterolemia 06/10/2020    Edema, lower extremity 06/10/2020    CKD (chronic kidney disease) stage 3, GFR 30-59 ml/min (HCC) 06/10/2020    Parkinson disease 2020      LOS (days): 2  Geometric Mean LOS (GMLOS) (days): 2.7  Days to GMLOS:1     OBJECTIVE:  PATIENT READMITTED TO HOSPITAL  Risk of Unplanned Readmission Score: 16.54         Current admission status: Inpatient       Preferred Pharmacy:   [facility]/pharmacy #[id number] [place] [address]  Phone: [phone] Fax: [phone]    Primary Care Provider: Olivia Blackwood MD    Primary Insurance: MEDICARE  Secondary Insurance: AARP    ASSESSMENT:  Active Health Care Proxies    There are no active Health Care Proxies on file.       Advance Directives  Does patient have a Health Care POA?: Yes  Does patient have Advance Directives?: Yes  Advance Directives: Power of  for health care  Primary Contact: Anthony Pat (Spouse)  [phone] (Mobile)         Readmission Root Cause  30 Day Readmission: Yes  Who directed you to return to the hospital?: Family  Did you understand whom to contact if you had questions or problems?: Yes  Did you get your prescriptions before you left the hospital?: Yes  Were you able to get your prescriptions filled when you left the hospital?: Yes  Did you  take your medications as prescribed?: Yes  Were you able to get to your follow-up appointments?: No  Reason:: Readmitted prior to appointment  What post-acute resources were offered?: [facility]  Patient was readmitted due to: Seizure Like Activity  Action Plan: vEEG    Patient Information  Admitted from:: Home  Mental Status: Alert  During Assessment patient was accompanied by: Spouse  Assessment information provided by:: Spouse, Patient  Primary Caregiver: Self  Support Systems: Spouse/significant other  County of Residence: [place]  What city do you live in?: [place]  Home entry access options. Select all that apply.: No steps to enter home  Type of Current Residence: Bi-level  Upon entering residence, is there a bedroom on the main floor (no further steps)?: No  A bedroom is located on the following floor levels of residence (select all that apply):: 2nd Floor  Upon entering residence, is there a bathroom on the main floor (no further steps)?: No  Indicate which floors of current residence have a bathroom (select all the apply):: 2nd Floor  Number of steps to 2nd floor from main floor: 6  Living Arrangements: Lives w/ Spouse/significant other  Is patient a ?: No    Activities of Daily Living Prior to Admission  Functional Status: Independent  Completes ADLs independently?: Yes  Ambulates independently?: Yes  Does patient use assisted devices?: No  Does patient currently own DME?: Yes  What DME does the patient currently own?: [employer]  Does patient have a history of Outpatient Therapy (PT/OT)?: No  Does the patient have a history of Short-Term Rehab?: No  Does patient have a history of HHC?: Yes  Does patient currently have HHC?: Yes    Current Home Health Care  Type of Current Home Care Services: Home PT  Current Home Health Agency:: [facility]  Current Home Health Follow-Up Provider:: PCP    Patient Information Continued  Income Source: Pension/half-way  Does patient receive dialysis  treatments?: No  Does patient have a history of substance abuse?: No  Does patient have a history of Mental Health Diagnosis?: No         Means of Transportation  Means of Transport to Appts:: Family transport      Social Determinants of Health (SDOH)      Flowsheet Row Most Recent Value   Housing Stability    In the last 12 months, was there a time when you were not able to pay the mortgage or rent on time? N   In the last 12 months, how many places have you lived? 1   In the last 12 months, was there a time when you did not have a steady place to sleep or slept in a shelter (including now)? N   Transportation Needs    In the past 12 months, has lack of transportation kept you from medical appointments or from getting medications? no   In the past 12 months, has lack of transportation kept you from meetings, work, or from getting things needed for daily living? No   Food Insecurity    Within the past 12 months, you worried that your food would run out before you got the money to buy more. Never true   Within the past 12 months, the food you bought just didn't last and you didn't have money to get more. Never true   Utilities    In the past 12 months has the electric, gas, oil, or water company threatened to shut off services in your home? No          CM reviewed d/c planning process including the following: identifying help at home, patient preference for d/c planning needs,[facility] Meds to Bed program, availability of treatment team to discuss questions or concerns patient and/or family may have regarding understanding medications and recognizing signs and symptoms once discharged. CM also encouraged patient to follow up with all recommended appointments after discharge. Patient advised of importance for patient and family to participate in managing patient’s medical well being.

## 2024-02-13 NOTE — PROGRESS NOTES
Patient:    MRN:  [id number]    Carin Request ID:  [id number]    Level of care reserved:  Home Health Agency    Partner Reserved:  [facility], [place] [address] [phone]    Clinical needs requested:    Geography searched:  [phone]    Start of Service:    Request sent:  12:25pm EST on 2/13/2024 by Erasmo Cash    Partner reserved:  1:26pm EST on 2/13/2024 by Erasmo Cash    Choice list shared:  1:26pm EST on 2/13/2024 by Erasmo Cash

## 2024-02-13 NOTE — PROGRESS NOTES
NEUROLOGY RESIDENCY PROGRESS NOTE     Name: Kamilla Pat   Age & Sex: 78 y.o. female   MRN: [id number]  Unit/Bed#: [facility] 717-01   Encounter: [id number]    Kamilla Pat Has appointment with [facility] Neurology on 24.  She will not require outpatient neurological testing.      Clear for discharge from Neurology standpoint.      ASSESSMENT & PLAN     * Seizure-like activity (HCC)  Assessment & Plan  Assessment:  78-year-old right-handed female with past medical history of Parkinson disease, seizure-like activity, hypercholesterolemia, CKD stage III, history of multiple subsegmental pulmonary emoli that presented to [facility] on 2024 for seizure-like activity.  Patient was discharged the same day, and shortly after discharge patient was reported to have had another episode of seizure described as jaw clenching, then subsequent generalization to involve whole body tonic-clonic activity, lasting 5 minutes.  Transferred to [facility] for spell characterization.    Workup:  Lab Results   Component Value Date    AST 4 (L) 2024    ALT <3 (L) 2024    TBILI 0.89 2024    LACTICACID 1.6 2024    LEVETIRACETA 19.5 2024    VALPROICTOT 104 (H) 2024     Blood glucose: 132  CMP/ca: Na 134, CO2 9, K 3.1, Tbil 1.28  Ma.1  Lactic acid: 11.6 --> 1.8  Urine Tox: WNL  UA Microscopic: WNL  CT head: Stable findings without acute intracranial abnormality.  MRI Brain w wo contrast: Stable MRI of the brain with no acute intracranial abnormality. 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   Routine EEG: This Routine EEG recorded during wakefulness, drowsiness, and sleep is abnormal. Background activities and posteriorly dominant rhythm are mildly too slow, suggesting mild diffuse cerebral dysfunction of nonspecific etiology.   vEE24-24 results show background disorganized alpha-theta and delta activity. No  "epileptiform discharges  vEE24-24 results show diffusely slow activity. No epileptiform discharges or seizures. Some intermittent prominent delta activities in the Left temporal area.   Depakote level morning of 24 = 104      Impression:  Unclear reasoning as to why patient has been experiencing increasing seizure episodes.  Further evaluation with MRI demonstrated no acute intracranial pathology, and EEG demonstrated findings consistent with mild cerebral diffuse dysfunction of nonspecific etiology. Video EEG monitoring for further spell characterization preliminary results show \"Background disorganized alpha-theta and delta activity. No epileptiform discharges\". Etiology possibly secondary to patient's increased Sinemet dosing/Levodopa-induced dyskinesia.       Plan:  Case discussed with attending neurologist Dr. Laverne Soares complete - Remove vEEG   Maintain normotension, normothermia, euglycemia  Recommend continuing levetiracetam 1500 mg q12 H and Depakote 500 mg q12 H at discharge   As outpatient, check valproate level in 3 days after DC - Patient's PCP clinic has been contacted to provide lab order to be drawn approximately on 2024.   Counseled to keep log of when she takes medications and to note timing of symptoms   Counseled on seizure precautions at DC, including no driving after 6 months   [employer] [place] at previous campus  Continue to evaluate and treat any infectious, inflammatory, metabolic derangements  Continue PT/OT  Continue seizure precautions  Give Vit B12 injection x 3 days, then weekly. Also, recommend daily 1000 vit B12 oral supplement after injections.   DVT prophylaxis per primary team  Recommend 1 mg of intravenous lorazepam for generalized tonic-clonic seizure only lasting more than 3 minutes, please contact neurology  Recommend noncontrast CT head, STAT, to be completed for any acute change in mental status/neurologic examination, please contact " neurology  Rest per primary team appreciated  Follow up with Neuro at [facility] - reports has appointment on     Parkinson disease  Assessment & Plan  Recommend continuing carbidopa-levodopa home dosing.  Follow up with established Neuro team at [facility] for adjustments to regimen         SUBJECTIVE     Patient was seen and examined. No acute events overnight. Reports feeling well. Denies seizure-like episodes overnight.      Pertinent Negatives include: numbness, weakness, speech or visual changes, headaches, seizures, paralysis/weakness, numbness or tingling, involuntary movements, tremor     Review of Systems   Constitutional:  Negative for chills, diaphoresis, fatigue and fever.   HENT:  Negative for ear pain and sore throat.    Eyes:  Negative for pain and visual disturbance.   Respiratory:  Negative for cough, chest tightness and shortness of breath.    Cardiovascular:  Negative for chest pain, palpitations and leg swelling.   Gastrointestinal:  Negative for abdominal pain, diarrhea, nausea and vomiting.   Genitourinary:  Negative for dysuria and hematuria.   Musculoskeletal:  Negative for arthralgias and back pain.   Skin:  Negative for color change and rash.   Neurological:  Negative for dizziness, tremors, seizures, syncope, facial asymmetry, speech difficulty, weakness, light-headedness, numbness and headaches.   Psychiatric/Behavioral:  Negative for confusion and sleep disturbance.    All other systems reviewed and are negative.       OBJECTIVE     Patient ID: Kamilla Pat is a 78 y.o. female.    Vitals:    24 1500 24 2246 24 0605 24 0754   BP:  115/69 119/74 121/73   Pulse:  89 89 94   Resp: 16  16 16   Temp:  98 °F (36.7 °C) (!) 97.4 °F (36.3 °C) 98 °F (36.7 °C)   TempSrc:   Oral    SpO2:  94% 94% 90%      Temperature:   Temp (24hrs), Av.8 °F (36.6 °C), Min:97.4 °F (36.3 °C), Max:98 °F (36.7 °C)    Temperature: 98 °F (36.7 °C)      Physical Exam  Vitals reviewed.    Constitutional:       Appearance: Normal appearance. She is normal weight. She is not ill-appearing or diaphoretic.   HENT:      Head: Normocephalic and atraumatic.      Mouth/Throat:      Mouth: Mucous membranes are moist.      Pharynx: Oropharynx is clear.   Eyes:      General: No scleral icterus.     Extraocular Movements: EOM normal.      Conjunctiva/sclera: Conjunctivae normal.   Cardiovascular:      Rate and Rhythm: Normal rate and regular rhythm.      Pulses: Normal pulses.      Heart sounds: Normal heart sounds. No murmur heard.     No gallop.   Pulmonary:      Effort: Pulmonary effort is normal. No respiratory distress.      Breath sounds: Normal breath sounds. No wheezing or rales.   Abdominal:      General: Bowel sounds are normal. There is no distension.      Palpations: Abdomen is soft. There is no mass.      Tenderness: There is no abdominal tenderness.   Musculoskeletal:         General: Normal range of motion.      Cervical back: Normal range of motion and neck supple.      Right lower leg: No edema.      Left lower leg: No edema.   Skin:     General: Skin is warm and dry.      Capillary Refill: Capillary refill takes less than 2 seconds.      Coloration: Skin is not jaundiced or pale.   Neurological:      General: No focal deficit present.      Mental Status: She is alert and oriented to person, place, and time. Mental status is at baseline.      Cranial Nerves: Cranial nerves 2-12 are intact.      Sensory: No sensory deficit.      Motor: Motor strength is normal.No weakness.      Deep Tendon Reflexes:      Reflex Scores:       Bicep reflexes are 2+ on the right side and 2+ on the left side.  Psychiatric:         Mood and Affect: Mood normal.         Speech: Speech normal.         Behavior: Behavior normal.         Thought Content: Thought content normal.         Judgment: Judgment normal.           Neurologic Exam     Mental Status   Oriented to person, place, and time.   Oriented to person.    Oriented to place. Disoriented to country and city.   Oriented to year, month and date.   Attention: normal. Concentration: normal.   Speech: speech is normal   Level of consciousness: alert    Cranial Nerves   Cranial nerves II through XII intact.     CN II   Right visual field deficit: none  Left visual field deficit: none     CN III, IV, VI   Extraocular motions are normal.   Right pupil: Shape: regular.   Left pupil: Shape: regular.     CN V   Facial sensation intact.     CN VII   Facial expression full, symmetric.   Right facial weakness: none  Left facial weakness: none    CN VIII   CN VIII normal.     CN XI   CN XI normal.     CN XII   CN XII normal.   Fasciculations: absent  Tongue deviation: none    Motor Exam   Overall muscle tone: normal  Right arm pronator drift: absent  Left arm pronator drift: absent    Strength   Strength 5/5 throughout.     Sensory Exam   Light touch normal.     Gait, Coordination, and Reflexes     Tremor   Resting tremor: absent  Intention tremor: absent    Reflexes   Right biceps: 2+  Left biceps: 2+         LABORATORY DATA     Labs: I have personally reviewed pertinent reports.    Results from last 7 days   Lab Units 02/12/24  0435 02/10/24  0516 02/09/24  0630 02/08/24  1535 02/07/24 0441 02/06/24  2107   WBC Thousand/uL 3.85* 5.76 4.21* 10.98*   < > 6.44   HEMOGLOBIN g/dL 11.7 10.6* 10.2* 12.3   < > 12.5   HEMATOCRIT % 35.3 31.9* 31.5* 39.8   < > 39.8   PLATELETS Thousands/uL 251 182 176 277   < > 257   NEUTROS PCT %  --   --  72 79*  --  78*   MONOS PCT %  --   --  11 8  --  6   EOS PCT %  --   --  2 1  --  0    < > = values in this interval not displayed.      Results from last 7 days   Lab Units 02/12/24  0522 02/11/24  0300 02/10/24  0516 02/08/24  1842 02/08/24  1535 02/07/24  0441 02/06/24  2110   SODIUM mmol/L 134* 131* 130*   < > 136   < > 134*   POTASSIUM mmol/L 3.6 3.3* 3.4*   < > 3.4*   < > 3.1*   CHLORIDE mmol/L 100 94* 97   < > 101   < > 98   CO2 mmol/L 22 25 22    < > 10*   < > 9*   BUN mg/dL 11 9 5   < > 8   < > 10   CREATININE mg/dL 0.60 0.78 0.66   < > 0.87   < > 0.88   CALCIUM mg/dL 8.4 9.2 8.7   < > 9.6   < > 9.5   ALK PHOS U/L 70  --   --   --  87  --  92   ALT U/L <3*  --   --   --  <3*  --  <3*   AST U/L 4*  --   --   --  6*  --  7*    < > = values in this interval not displayed.     Results from last 7 days   Lab Units 24  0630 24  2110   MAGNESIUM mg/dL 2.0 2.1     Results from last 7 days   Lab Units 24  2110   PHOSPHORUS mg/dL 3.9      Results from last 7 days   Lab Units 24  1535 24  2107   INR  1.26* 1.04   PTT seconds 23 24     Results from last 7 days   Lab Units 24  2340   LACTIC ACID mmol/L 1.6           IMAGING & DIAGNOSTIC TESTING     Radiology Results: I have personally reviewed pertinent reports.      2024 - CT BRAIN - WITHOUT CONTRAST   IMPRESSION:   Stable findings without acute intracranial abnormality.     2024 - MRI BRAIN WITH AND WITHOUT CONTRAST   IMPRESSION:  Stable MRI of the brain with no acute intracranial abnormality. 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    2024-2024 - EEG Video monitoring 24 hrs   Impression:   vEE24-24 results show background disorganized alpha-theta and delta activity. No epileptiform discharges  vEE24-24 results show diffusely slow activity. No epileptiform discharges or seizures. Some intermittent prominent delta activities in the Left temporal area.      Other Diagnostic Testing: I have personally reviewed pertinent reports.      ACTIVE MEDICATIONS     Current Facility-Administered Medications   Medication Dose Route Frequency    apixaban (ELIQUIS) tablet 5 mg  5 mg Oral BID    carbidopa-levodopa (SINEMET)  mg per tablet 3 tablet  3 tablet Oral 4x Daily    cholecalciferol (VITAMIN D3) tablet 1,000 Units  1,000 Units Oral Daily    cyanocobalamin injection 1,000 mcg  1,000 mcg  Intramuscular Weekly    Followed by    [START ON 3/4/2024] cyanocobalamin injection 1,000 mcg  1,000 mcg Intramuscular Q30 Days    divalproex sodium (DEPAKOTE ER) 24 hr tablet 500 mg  500 mg Oral Q12H    ezetimibe (ZETIA) tablet 10 mg  10 mg Oral HS    levETIRAcetam (KEPPRA) tablet 1,500 mg  1,500 mg Oral Q12H CESAR    LORazepam (ATIVAN) injection 1 mg  1 mg Intravenous Q6H PRN    potassium chloride (Klor-Con M20) CR tablet 20 mEq  20 mEq Oral Daily    triamterene-hydrochlorothiazide (MAXZIDE-25) 37.5-25 mg per tablet 1 tablet  1 tablet Oral Daily       Prior to Admission medications    Medication Sig Start Date End Date Taking? Authorizing Provider   acetaminophen (TYLENOL) 325 mg tablet Take 325 mg by mouth every 6 (six) hours as needed for mild pain    Historical Provider, MD   apixaban (Eliquis) 5 mg Take 2 tablets (10 mg total) by mouth 2 (two) times a day for 7 days, THEN 1 tablet (5 mg total) 2 (two) times a day for 21 days. 1/12/24 2/9/24  Jonathan Jenkins MD   Calcium Carbonate-Vitamin D (CALCIUM-CARB 600 + D PO) Take 1 tablet by mouth daily    Historical Provider, MD   carbidopa-levodopa (SINEMET)  mg per tablet Take 3 tablets by mouth 4 (four) times a day Please continue to take Sinemet as per your neurologist's instruction. 2/8/24 3/9/24  Ruperto Sexton MD   Cholecalciferol 50 MCG (2000 UT) CAPS Take 1 capsule by mouth daily    Historical Provider, MD   Diclofenac Sodium (VOLTAREN) 1 % Apply 2 g topically 4 (four) times a day 8/15/23   [facility] Cindi Christianson MD   ezetimibe (ZETIA) 10 mg tablet Take by mouth Daily 4/1/19   Historical Provider, MD   levETIRAcetam (KEPPRA) 1000 MG tablet Take 1 tablet (1,000 mg total) by mouth every 12 (twelve) hours 2/8/24 3/9/24  Ruperto Sexton MD   potassium chloride (MICRO-K) 10 MEQ CR capsule Take 20 mEq by mouth Daily Per [facility] note from Nahomi Sandoval, DO - 12/19/2023 10:10 AM EST 20 meq.  4/1/19   Historical Provider, MD   triamterene-hydrochlorothiazide (DYAZIDE) 37.5-25 mg  per capsule Take 1 capsule by mouth Daily 4/1/19   Historical Provider, MD         VTE Pharmacologic Prophylaxis: Apixaban (Eliquis)  VTE Mechanical Prophylaxis: sequential compression device    ======  I have discussed the patient's history, physical exam findings, assessment, and plan in detail with attending, Dr. Lisy Galloway MD    Thank you for allowing me to participate in the care of your patient, Kamilla Pat.    Srikanth Flowers MD  [facility] Internal Medicine Residency, PGY-3

## 2024-02-13 NOTE — ASSESSMENT & PLAN NOTE
Assessment:  78-year-old right-handed female with past medical history of Parkinson disease, seizure-like activity, hypercholesterolemia, CKD stage III, history of multiple subsegmental pulmonary emoli that presented to [facility] on 2024 for seizure-like activity.  Patient was discharged the same day, and shortly after discharge patient was reported to have had another episode of seizure described as jaw clenching, then subsequent generalization to involve whole body tonic-clonic activity, lasting 5 minutes.  Transferred to [facility] for spell characterization.    Workup:  Lab Results   Component Value Date    AST 4 (L) 2024    ALT <3 (L) 2024    TBILI 0.89 2024    LACTICACID 1.6 2024    LEVETIRACETA 19.5 2024    VALPROICTOT 104 (H) 2024     Blood glucose: 132  CMP/ca: Na 134, CO2 9, K 3.1, Tbil 1.28  Ma.1  Lactic acid: 11.6 --> 1.8  Urine Tox: WNL  UA Microscopic: WNL  CT head: Stable findings without acute intracranial abnormality.  MRI Brain w wo contrast: Stable MRI of the brain with no acute intracranial abnormality. 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   Routine EEG: This Routine EEG recorded during wakefulness, drowsiness, and sleep is abnormal. Background activities and posteriorly dominant rhythm are mildly too slow, suggesting mild diffuse cerebral dysfunction of nonspecific etiology.   vEE24-24 results show background disorganized alpha-theta and delta activity. No epileptiform discharges  vEE24-24 results show diffusely slow activity. No epileptiform discharges or seizures. Some intermittent prominent delta activities in the Left temporal area.   Depakote level morning of 24 = 104      Impression:  Unclear reasoning as to why patient has been experiencing increasing seizure episodes.  Further evaluation with MRI demonstrated no acute intracranial  "pathology, and EEG demonstrated findings consistent with mild cerebral diffuse dysfunction of nonspecific etiology. Video EEG monitoring for further spell characterization preliminary results show \"Background disorganized alpha-theta and delta activity. No epileptiform discharges\". Etiology possibly secondary to patient's increased Sinemet dosing/Levodopa-induced dyskinesia.       Plan:  Case discussed with attending neurologist Dr. Laverne Soares complete - Remove vEEG   Maintain normotension, normothermia, euglycemia  Recommend continuing levetiracetam 1500 mg q12 H and Depakote 500 mg q12 H at discharge   As outpatient, check valproate level in 3 days after DC - Patient's PCP clinic has been contacted to provide lab order to be drawn approximately on Feb 16, 2024.   Counseled to keep log of when she takes medications and to note timing of symptoms   Counseled on seizure precautions at [place], including no driving after 6 months   [employer] [place] at previous campus  Continue to evaluate and treat any infectious, inflammatory, metabolic derangements  Continue PT/OT  Continue seizure precautions  Give Vit B12 injection x 3 days, then weekly. Also, recommend daily 1000 vit B12 oral supplement after injections.   DVT prophylaxis per primary team  Recommend 1 mg of intravenous lorazepam for generalized tonic-clonic seizure only lasting more than 3 minutes, please contact neurology  Recommend noncontrast CT head, STAT, to be completed for any acute change in mental status/neurologic examination, please contact neurology  Rest per primary team appreciated  Follow up with Neuro at [facility] - reports has appointment on 2/29  "

## 2024-02-13 NOTE — ASSESSMENT & PLAN NOTE
Recommend continuing carbidopa-levodopa home dosing.  Follow up with established Neuro team at [facility] for adjustments to regimen    Prophylactic measure Prophylactic measure Prophylactic measure

## 2024-02-13 NOTE — PROGRESS NOTES
[facility]  Progress Note  Name: Kamilla Pat I  MRN: [id number]  Unit/Bed#: [facility] 717-01 I Date of Admission: 2/11/2024   Date of Service: 2/13/2024 I Hospital Day: 2    Assessment/Plan   * Seizure-like activity (HCC)  Assessment & Plan  Patient was recently admitted from 2/6 to 2/8 for seizure-like activity as reported generalized shaking and jerking with loss of consciousness per family.    MRI brain on that admission was unremarkable.  Patient evaluated by neurology and was discharged on Keppra however and route to home she had another episode hence presented again to [facility].  Keppra was increased from 750 to 1000 mg twice daily.  Case was discussed with neurology and recommended transfer to [facility] for video EEG.  Continue Ativan as needed.  Started on seizure prophylaxis.  Neurology recommendations  No further evaluation is indicated at this time. No change in medications or dose at this time.     CKD (chronic kidney disease) stage 2, GFR 60-89 ml/min  Assessment & Plan  Lab Results   Component Value Date    EGFR 88 02/12/2024    EGFR 74 02/11/2024    EGFR 86 02/10/2024    CREATININE 0.60 02/12/2024    CREATININE 0.78 02/11/2024    CREATININE 0.66 02/10/2024     Creatinine at baseline.  Avoid nephrotoxins and hypotension.    History of pulmonary embolism  Assessment & Plan  Diagnosed with PE on 1/11/2024.  Currently on Eliquis 5 mg twice a day.  As per her previous discharge summary she has to be on Eliquis for total 6 months.  Continue Eliquis 5 mg twice daily    Ambulatory dysfunction  Assessment & Plan  Will have PT/OT evaluate     Pure hypercholesterolemia  Assessment & Plan  Continue Zetia at home dosage.    Parkinson disease  Assessment & Plan  Currently taking Sinemet immediate release 3 tablets 4 times daily.  Patient was seen by movement disorder specialist in the month of December 2023 and increase the dose.  The first-time seizure that she  had was in 2023.  Patient does not recall if the seizure onset is prior to or after the dose adjustment.  She has been feeling restless, agitated, anxious, and having insomnia.  Suspecting increase in dopamine levels.      Plan:  Continue home dosage Sinemet               VTE Pharmacologic Prophylaxis:   Moderate Risk (Score 3-4) - Pharmacological DVT Prophylaxis Ordered: apixaban (Eliquis).    Mobility:   Basic Mobility Inpatient Raw Score: 10  -HLM Goal: 4: Move to chair/commode  -HLM Achieved: 2: Bed activities/Dependent transfer  HLM Goal NOT achieved. Continue with multidisciplinary rounding and encourage appropriate mobility to improve upon HLM goals.    Patient Centered Rounds: I performed bedside rounds with nursing staff today.   Discussions with Specialists or Other Care Team Provider: Neurology     Education and Discussions with Family / Patient: Updated  () via phone.    Total Time Spent on Date of Encounter in care of patient: 45 mins. This time was spent on one or more of the following: performing physical exam; counseling and coordination of care; obtaining or reviewing history; documenting in the medical record; reviewing/ordering tests, medications or procedures; communicating with other healthcare professionals and discussing with patient's family/caregivers.    Current Length of Stay: 2 day(s)  Current Patient Status: Inpatient   Certification Statement: The patient will continue to require additional inpatient hospital stay due to Amb Dysfunction  Discharge Plan: Anticipate discharge in 24-48 hrs to Pending PT/ OT evaluation     Code Status: Level 1 - Full Code    Subjective:   Is a very pleasant 78-year-old female who was seen and evaluated today at bedside.  Patient has no further complaints at this time.    Objective:     Vitals:   Temp (24hrs), Av.8 °F (36.6 °C), Min:97.4 °F (36.3 °C), Max:98 °F (36.7 °C)    Temp:  [97.4 °F (36.3 °C)-98 °F (36.7 °C)] 98  °F (36.7 °C)  HR:  [89-95] 94  Resp:  [14-16] 16  BP: (115-122)/(69-74) 121/73  SpO2:  [90 %-94 %] 90 %  There is no height or weight on file to calculate BMI.     Input and Output Summary (last 24 hours):     Intake/Output Summary (Last 24 hours) at 2/13/2024 1456  Last data filed at 2/13/2024 0501  Gross per 24 hour   Intake 240 ml   Output 750 ml   Net -510 ml       Physical Exam:   Physical Exam  Vitals reviewed.   Constitutional:       General: She is not in acute distress.     Appearance: She is not ill-appearing.   HENT:      Head: Normocephalic.   Eyes:      Conjunctiva/sclera: Conjunctivae normal.   Cardiovascular:      Rate and Rhythm: Normal rate and regular rhythm.      Pulses: Normal pulses.      Heart sounds: Normal heart sounds.   Pulmonary:      Effort: Pulmonary effort is normal.   Abdominal:      General: Abdomen is flat.      Palpations: Abdomen is soft.      Tenderness: There is no abdominal tenderness.   Skin:     General: Skin is warm and dry.   Neurological:      General: No focal deficit present.      Mental Status: She is alert. Mental status is at baseline.          Additional Data:     Labs:  Results from last 7 days   Lab Units 02/12/24  0435 02/10/24  0516 02/09/24  0630   WBC Thousand/uL 3.85*   < > 4.21*   HEMOGLOBIN g/dL 11.7   < > 10.2*   HEMATOCRIT % 35.3   < > 31.5*   PLATELETS Thousands/uL 251   < > 176   NEUTROS PCT %  --   --  72   LYMPHS PCT %  --   --  13*   MONOS PCT %  --   --  11   EOS PCT %  --   --  2    < > = values in this interval not displayed.     Results from last 7 days   Lab Units 02/12/24  0522   SODIUM mmol/L 134*   POTASSIUM mmol/L 3.6   CHLORIDE mmol/L 100   CO2 mmol/L 22   BUN mg/dL 11   CREATININE mg/dL 0.60   ANION GAP mmol/L 12   CALCIUM mg/dL 8.4   ALBUMIN g/dL 3.6   TOTAL BILIRUBIN mg/dL 0.89   ALK PHOS U/L 70   ALT U/L <3*   AST U/L 4*   GLUCOSE RANDOM mg/dL 81     Results from last 7 days   Lab Units 02/08/24  1535   INR  1.26*     Results from last 7  days   Lab Units 02/06/24  2034   POC GLUCOSE mg/dl 132     Results from last 7 days   Lab Units 02/08/24  0450   HEMOGLOBIN A1C % 5.2     Results from last 7 days   Lab Units 02/06/24  2340 02/06/24  2107   LACTIC ACID mmol/L 1.6 11.8*       Lines/Drains:  Invasive Devices       Peripheral Intravenous Line  Duration             Peripheral IV 02/12/24 Left;Proximal;Ventral (anterior) Forearm 1 day              Drain  Duration             External Urinary Catheter 1 day                          Imaging: Reviewed radiology reports from this admission including: MRI brain    Recent Cultures (last 7 days):         Last 24 Hours Medication List:   Current Facility-Administered Medications   Medication Dose Route Frequency Provider Last Rate    apixaban  5 mg Oral BID Ching Clements MD      carbidopa-levodopa  3 tablet Oral 4x Daily Ching Clements MD      cholecalciferol  1,000 Units Oral Daily Ching Clements MD      cyanocobalamin  1,000 mcg Intramuscular Weekly Srikanth Flowers MD      Followed by    [START ON 3/4/2024] cyanocobalamin  1,000 mcg Intramuscular Q30 Days Srikanth Flowers MD      divalproex sodium  500 mg Oral Q12H Ching Clements MD      ezetimibe  10 mg Oral HS Ching Clements MD      levETIRAcetam  1,500 mg Oral Q12H CESAR Ching Clements MD      LORazepam  1 mg Intravenous Q6H PRN Ching Clements MD      potassium chloride  20 mEq Oral Daily Ching Clements MD      triamterene-hydrochlorothiazide  1 tablet Oral Daily Ching Clements MD          Today, Patient Was Seen By: Wellington Cain MD    **Please Note: This note may have been constructed using a voice recognition system.**

## 2024-02-13 NOTE — ASSESSMENT & PLAN NOTE
Patient was recently admitted from 2/6 to 2/8 for seizure-like activity as reported generalized shaking and jerking with loss of consciousness per family.    MRI brain on that admission was unremarkable.  Patient evaluated by neurology and was discharged on Keppra however and route to home she had another episode hence presented again to [facility].  Keppra was increased from 750 to 1000 mg twice daily.  Case was discussed with neurology and recommended transfer to [facility] for video EEG.  Continue Ativan as needed.  Started on seizure prophylaxis.  Neurology recommendations  No further evaluation is indicated at this time. No change in medications or dose at this time.

## 2024-02-14 PROCEDURE — 95720 EEG PHY/QHP EA INCR W/VEEG: CPT | Performed by: PSYCHIATRY & NEUROLOGY

## 2024-02-14 PROCEDURE — 97167 OT EVAL HIGH COMPLEX 60 MIN: CPT

## 2024-02-14 PROCEDURE — 99232 SBSQ HOSP IP/OBS MODERATE 35: CPT | Performed by: FAMILY MEDICINE

## 2024-02-14 PROCEDURE — 97163 PT EVAL HIGH COMPLEX 45 MIN: CPT

## 2024-02-14 RX ADMIN — APIXABAN 5 MG: 5 TABLET, FILM COATED ORAL at 17:34

## 2024-02-14 RX ADMIN — POTASSIUM CHLORIDE 20 MEQ: 1500 TABLET, EXTENDED RELEASE ORAL at 10:41

## 2024-02-14 RX ADMIN — DIVALPROEX SODIUM 500 MG: 500 TABLET, EXTENDED RELEASE ORAL at 10:39

## 2024-02-14 RX ADMIN — LEVETIRACETAM 1500 MG: 750 TABLET, FILM COATED ORAL at 21:25

## 2024-02-14 RX ADMIN — LEVETIRACETAM 1500 MG: 750 TABLET, FILM COATED ORAL at 10:40

## 2024-02-14 RX ADMIN — CARBIDOPA AND LEVODOPA 3 TABLET: 25; 100 TABLET ORAL at 10:42

## 2024-02-14 RX ADMIN — TRIAMTERENE AND HYDROCHLOROTHIAZIDE 1 TABLET: 37.5; 25 TABLET ORAL at 10:39

## 2024-02-14 RX ADMIN — Medication 1000 UNITS: at 10:40

## 2024-02-14 RX ADMIN — CARBIDOPA AND LEVODOPA 3 TABLET: 25; 100 TABLET ORAL at 14:09

## 2024-02-14 RX ADMIN — APIXABAN 5 MG: 5 TABLET, FILM COATED ORAL at 10:39

## 2024-02-14 RX ADMIN — DIVALPROEX SODIUM 500 MG: 500 TABLET, EXTENDED RELEASE ORAL at 21:26

## 2024-02-14 RX ADMIN — EZETIMIBE 10 MG: 10 TABLET ORAL at 21:26

## 2024-02-14 RX ADMIN — CARBIDOPA AND LEVODOPA 3 TABLET: 25; 100 TABLET ORAL at 17:34

## 2024-02-14 RX ADMIN — CARBIDOPA AND LEVODOPA 3 TABLET: 25; 100 TABLET ORAL at 21:25

## 2024-02-14 NOTE — OCCUPATIONAL THERAPY NOTE
Occupational Therapy Evaluation     Patient Name: Kamilla Pat  Today's Date: 2/14/2024  Problem List  Principal Problem:    Seizure-like activity (HCC)  Active Problems:    Parkinson disease    Pure hypercholesterolemia    Ambulatory dysfunction    History of pulmonary embolism    CKD (chronic kidney disease) stage 2, GFR 60-89 ml/min    Past Medical History  Past Medical History:   Diagnosis Date    Anxiety     Hepatitis C 04/01/2017    screening Negative    High cholesterol     Hip pain     History of low potassium     Hyponatremia     Lower extremity edema     Obesity     Osteopenia     Overweight     Thigh pain      Past Surgical History  Past Surgical History:   Procedure Laterality Date    BLADDER SURGERY      CHOLECYSTECTOMY      COLOGUARD (HISTORICAL)  07/02/2018    COLONOSCOPY  01/01/2007    DXA PROCEDURE (HISTORICAL)  03/27/2014    MAMMO (HISTORICAL)  08/31/2016    REDUCTION MAMMAPLASTY      REPLACEMENT TOTAL KNEE Bilateral     TOTAL HIP ARTHROPLASTY Left 01/01/2015    TOTAL KNEE ARTHROPLASTY Left            02/14/24 0854   OT Last Visit   OT Visit Date 02/14/24   Note Type   Note type Evaluation   Pain Assessment   Pain Assessment Tool 0-10   Pain Score No Pain   Restrictions/Precautions   Weight Bearing Precautions Per Order No   Other Precautions Chair Alarm;Bed Alarm;Aspiration;Seizure;Fall Risk;Telemetry;Fluid restriction   Home Living   Type of Home House   Home Layout Multi-level;Bed/bath upstairs  (Pt lives in split level home with 0 GAMALIEL, 6 steps to the main level)   Bathroom Shower/Tub Tub/shower unit   Bathroom Toilet Standard   Bathroom Equipment Grab bars in shower;Tub transfer bench;Commode   Home Equipment Walker;Cane  (pt primarily uses RW)   Prior Function   Level of [place] Independent with ADLs;Independent with functional mobility;Needs assistance with IADLS   Lives With Spouse   Receives Help From Family   IADLs Family/Friend/Other provides transportation;Independent with  meal prep;Independent with medication management   Falls in the last 6 months 1 to 4  (2-3 falls due to legs giving out)   Vocational Retired  (retired  for [employer])   Comments pt uses RW PTA   Lifestyle   Autonomy Pt is I with ADL, but receives help from spouse with putting socks on and IADLs. Pt completes functional mobility by herself with RW, tends to have  with her for safety.   Reciprocal Relationships Family  (pt stated son and daughter live nearby)   Service to Others Retired   Intrinsic Gratification Enjoys spending time with grandkids   ADL   Where Assessed Edge of bed   Eating Assistance 5  Supervision/Setup   Grooming Assistance 5  Supervision/Setup   UB Bathing Assistance 4  Minimal Assistance   LB Bathing Assistance 3  Moderate Assistance   UB Dressing Assistance 4  Minimal Assistance   LB Dressing Assistance 3  Moderate Assistance   Toileting Assistance  3  Moderate Assistance   Bed Mobility   Supine to Sit 3  Moderate assistance   Additional items Assist x 1;HOB elevated;Bedrails;Increased time required;Verbal cues;LE management   Transfers   Sit to Stand 4  Minimal assistance   Additional items Assist x 2;Bedrails;Increased time required;Verbal cues   Stand to Sit 4  Minimal assistance   Additional items Assist x 2;Armrests;Increased time required;Verbal cues   Additional Comments Pt required verbal cues for hand placement with RW   Functional Mobility   Functional Mobility 4  Minimal assistance   Additional Comments Pt participated in functional mobility with Min Ax1 + SBA with chair follow using RW. Completed 2-3 steps towards door + poor activity tolerance   Additional items Rolling walker   Balance   Static Sitting Fair   Dynamic Sitting Fair -   Static Standing Fair -   Dynamic Standing Poor +   Ambulatory Poor   Activity Tolerance   Activity Tolerance Patient limited by fatigue   Medical Staff Made Aware OT Gem, PT Abdulaziz, SPT [place]   Nurse Made Aware  "Rn-cleared   RUE Assessment   RUE Assessment WFL  (r-handed)   LUE Assessment   LUE Assessment WFL   Vision-Basic Assessment   Current Vision Wears glasses all the time   Cognition   Overall Cognitive Status (S)  Impaired   Arousal/Participation Alert;Cooperative   Attention Attends with cues to redirect   Orientation Level Oriented X4   Memory Decreased recall of precautions;Decreased short term memory   Following Commands Follows one step commands with increased time or repetition   Comments Pt was pleasant and cooperative. Able to maintain attention t/o session. Able to follow therapy commands with increased time and multiple repetition. Pt required 2-3 verbal cues for RW safety. Pt displayed decrease in safety + judgement in functional task.   Assessment   Limitation Decreased ADL status;Decreased Safe judgement during ADL;Decreased cognition;Decreased endurance;Decreased self-care trans;Decreased high-level ADLs   Prognosis Fair   Assessment Pt seen for Occupational Therapy Evaluation. Pt is 78 y.o. female admitted [facility] on 2/11/2024 with Seizure-like activity (HCC), ambulation dysfunction, Parkinsons, and CKD. Pt has a past medical history of Anxiety, Hepatitis C, High cholesterol, Hip pain, History of low potassium, Hyponatremia, Lower extremity edema, Obesity, Osteopenia, Overweight, and Thigh pain. Pt presented supine in bed no recollection of unconsciousness as per pt family report. Pt lives with spouse in a split level home with 0 steps to get in, but 6 steps to 2nd level for bed/bath. Pt experienced 2-3 fall(s) in the past 6 months due to \"legs giving out\". Pt was I with ADLs, functional mobility, and transfers. Requires assistance for IADLs and transportation. Pt was A/Ox 4; with decrease of safety precautions with RW, and short term memory. Pt required Mod Ax1 for bed mobility, Min Ax2 for standing and Min Ax1 for functional mobility + SBA with chair follow, and Min Ax1 for ADLs. Pt " currently presents with impairments in the following categories: functional mobility, transfers, difficulty performing ADLs/IADLs (self-care, grooming, cooking, cleaning), activity tolerance, endurance, standing balance/tolerance and sitting balance/tolerance. Pt fatigue limits pt's ability to safely engage in all baseline areas of occupation including those listed above. The patient's raw score on the -PAC Daily Activity Inpatient Short Form is 15. A raw score of less than 19 suggests the patient may benefit from discharge to post-acute rehabilitation services. Please refer to the recommendation of the Occupational Therapist for safe discharge planning. From OT standpoint  recommend acute rehab max I level of intensity upon D/C.  OT will continue to follow to address the below stated goals.   Goals   Patient Goals to go home   LTG Time Frame 10-14   Plan   Treatment Interventions ADL retraining;Functional transfer training;Endurance training;Cognitive reorientation;Patient/family training;Equipment evaluation/education;Compensatory technique education;Energy conservation;Activityengagement   Goal Expiration Date 02/28/24   OT Frequency 2-3x/wk   Discharge Recommendation   Rehab Resource Intensity Level, OT I (Maximum Resource Intensity)   AM-PAC Daily Activity Inpatient   Lower Body Dressing 2   Bathing 2   Toileting 2   Upper Body Dressing 3   Grooming 3   Eating 3   Daily Activity Raw Score 15   Daily Activity Standardized Score (Calc for Raw Score >=11) 34.69   AM-PAC Applied Cognition Inpatient   Following a Speech/Presentation 3   Understanding Ordinary Conversation 4   Taking Medications 3   Remembering Where Things Are Placed or Put Away 3   Remembering List of 4-5 Errands 3   Taking Care of Complicated Tasks 3   Applied Cognition Raw Score 19   Applied Cognition Standardized Score 39.77   End of Consult   Education Provided Yes  (RW safety)   Patient Position at End of Consult Bedside chair;Bed/Chair  alarm activated;All needs within reach   Nurse Communication Nurse aware of consult      Goals:  - Pt will demonstrate safe bed mobility with Mod I in order to increase participation in ADLs.  - Pt will complete UB bathing/dressing with Mod I utilizing energy conservation techniques upon discharge.  - Pt will complete LB bathing/dressing with Mod I and proper body mechanics upon discharge home.  - Pt will complete toiletting with Mod I and in order to increase [place].  - Pt will participate in functional transfer tasks with Mod I while demonstrating safe actions.  - Pt will engage in functional mobility with Mod I while using RW and no cues for safety.  - Pt will increase activity tolerance to 15-20 mins to increase overall safety and independence in self-care activities upon discharge.  - Pt will increase dynamic standing balance to fair in order to participate in ADLs upon discharge.  - Pt will demonstrate educational competency with recall of safety precautions.  - Pt will implement energy conservation techniques for completing multi-step activities with <2 verbal cues.  - Pt will participate in cognitive assessment with good attention and participation for safe d/c planning.    Pankaj Sepulveda, OTS

## 2024-02-14 NOTE — CASE MANAGEMENT
Case Management Discharge Planning Note    Patient name Kamilla Pat  Location [facility] 717/[facility] 717-01 MRN [id number]  : 1945 Date 2024       Current Admission Date: 2024  Current Admission Diagnosis:Seizure-like activity (HCC)   Patient Active Problem List    Diagnosis Date Noted    Anxiety 2024    History of pulmonary embolism 2024    CKD (chronic kidney disease) stage 2, GFR 60-89 ml/min 2024    Generalized weakness 2024    Seizure-like activity (HCC) 2024    Ambulatory dysfunction 2024    Multiple subsegmental pulmonary emboli without acute cor pulmonale (HCC) 2024    Pure hypercholesterolemia 06/10/2020    Edema, lower extremity 06/10/2020    CKD (chronic kidney disease) stage 3, GFR 30-59 ml/min (HCC) 06/10/2020    Parkinson disease 2020      LOS (days): 3  Geometric Mean LOS (GMLOS) (days): 2.7  Days to GMLOS:-0.3     OBJECTIVE:  Risk of Unplanned Readmission Score: 16.82         Current admission status: Inpatient   Preferred Pharmacy:   [facility]/pharmacy #[id number] [place] [id number] [address]  Phone: [phone] Fax: [phone]    Primary Care Provider: Olivia Blackwood MD    Primary Insurance: MEDICARE  Secondary Insurance: AARP    DISCHARGE DETAILS:    Discharge planning discussed with:: Patient and spouse at the bedside  [place] of Choice: Yes     CM contacted family/caregiver?: Yes  Were Treatment Team discharge recommendations reviewed with patient/caregiver?: Yes  Did patient/caregiver verbalize understanding of patient care needs?: Yes  Were patient/caregiver advised of the risks associated with not following Treatment Team discharge recommendations?: Yes    Contacts  Patient Contacts: Anthony spouse  Relationship to Patient:: Family  Contact Method: In Person  Reason/Outcome: Discharge Planning      Other Referral/Resources/Interventions Provided:  Interventions: Acute Rehab  Referral Comments:  CM met with patient and spouse at the bedside, they are in agreement with IRF. CM submitted referrals and will follow for acceptance.    Would you like to participate in our [facility] service program?  : No - Declined    Treatment Team Recommendation: Acute Rehab  Discharge Destination Plan:: Acute Rehab

## 2024-02-14 NOTE — ARC ADMISSION
Referral received for consideration of patient for inpatient acute rehab. Will review patient's case and update CM as able.

## 2024-02-14 NOTE — PROGRESS NOTES
[facility]  Progress Note  Name: Kamilla Pat I  MRN: [id number]  Unit/Bed#: [facility]PHP 717-01 I Date of Admission: 2/11/2024   Date of Service: 2/14/2024 I Hospital Day: 3    Assessment/Plan   * Seizure-like activity (HCC)  Assessment & Plan  Patient was recently admitted from 2/6 to 2/8 for seizure-like activity as reported generalized shaking and jerking with loss of consciousness per family.    MRI brain on that admission was unremarkable.  Patient evaluated by neurology and was discharged on Keppra however and route to home she had another episode hence presented again to [facility].  Keppra was increased from 750 to 1000 mg twice daily.  Case was discussed with neurology and recommended transfer to [facility] for video EEG.  Continue Ativan as needed.  Started on seizure prophylaxis.  Neurology recommendations  No further evaluation is indicated at this time. No change in medications or dose at this time.     CKD (chronic kidney disease) stage 2, GFR 60-89 ml/min  Assessment & Plan  Lab Results   Component Value Date    EGFR 88 02/12/2024    EGFR 74 02/11/2024    EGFR 86 02/10/2024    CREATININE 0.60 02/12/2024    CREATININE 0.78 02/11/2024    CREATININE 0.66 02/10/2024     Creatinine at baseline.  Avoid nephrotoxins and hypotension.    History of pulmonary embolism  Assessment & Plan  Diagnosed with PE on 1/11/2024.  Currently on Eliquis 5 mg twice a day.  As per her previous discharge summary she has to be on Eliquis for total 6 months.  Continue Eliquis 5 mg twice daily    Ambulatory dysfunction  Assessment & Plan  Will have PT/OT evaluate   Patient will require acute rehab    Pure hypercholesterolemia  Assessment & Plan  Continue Zetia at home dosage.    Parkinson disease  Assessment & Plan  Currently taking Sinemet immediate release 3 tablets 4 times daily.  Patient was seen by movement disorder specialist in the month of December 2023 and increase the  dose.  The first-time seizure that she had was in 2023.  Patient does not recall if the seizure onset is prior to or after the dose adjustment.  She has been feeling restless, agitated, anxious, and having insomnia.  Suspecting increase in dopamine levels.      Plan:  Continue home dosage Sinemet               VTE Pharmacologic Prophylaxis:   Moderate Risk (Score 3-4) - Pharmacological DVT Prophylaxis Ordered: apixaban (Eliquis).    Mobility:   Basic Mobility Inpatient Raw Score: 10  -HLM Goal: 4: Move to chair/commode  -HLM Achieved: 2: Bed activities/Dependent transfer  HLM Goal NOT achieved. Continue with multidisciplinary rounding and encourage appropriate mobility to improve upon HLM goals.    Patient Centered Rounds: I performed bedside rounds with nursing staff today.   Discussions with Specialists or Other Care Team Provider: Physical therapy    Education and Discussions with Family / Patient: Updated  () at bedside.    Total Time Spent on Date of Encounter in care of patient: 40 mins. This time was spent on one or more of the following: performing physical exam; counseling and coordination of care; obtaining or reviewing history; documenting in the medical record; reviewing/ordering tests, medications or procedures; communicating with other healthcare professionals and discussing with patient's family/caregivers.    Current Length of Stay: 3 day(s)  Current Patient Status: Inpatient   Certification Statement: The patient will continue to require additional inpatient hospital stay due to placement  Discharge Plan:  Pending placement    Code Status: Level 1 - Full Code    Subjective:   This is a very pleasant 78-year-old female who is seen and evaluated at bedside.  Spoke to patient about PT OT evaluation patient is not excited however is agreeable to going to acute rehab.    Objective:     Vitals:   Temp (24hrs), Av °F (37.2 °C), Min:98.1 °F (36.7 °C), Max:99.8 °F (37.7  °C)    Temp:  [98.1 °F (36.7 °C)-99.8 °F (37.7 °C)] 98.1 °F (36.7 °C)  HR:  [94] 94  Resp:  [16] 16  BP: ()/(62-68) 117/68  SpO2:  [92 %] 92 %  There is no height or weight on file to calculate BMI.     Input and Output Summary (last 24 hours):     Intake/Output Summary (Last 24 hours) at 2/14/2024 1415  Last data filed at 2/14/2024 0800  Gross per 24 hour   Intake 2040 ml   Output 500 ml   Net 1540 ml       Physical Exam:   Physical Exam  Vitals reviewed.   Constitutional:       General: She is not in acute distress.     Appearance: She is not ill-appearing.   HENT:      Head: Normocephalic.   Eyes:      Conjunctiva/sclera: Conjunctivae normal.   Cardiovascular:      Rate and Rhythm: Normal rate and regular rhythm.      Pulses: Normal pulses.      Heart sounds: Normal heart sounds.   Pulmonary:      Effort: Pulmonary effort is normal.   Abdominal:      General: Abdomen is flat.      Palpations: Abdomen is soft.      Tenderness: There is no abdominal tenderness.   Skin:     General: Skin is warm and dry.   Neurological:      General: No focal deficit present.      Mental Status: She is alert. Mental status is at baseline.          Additional Data:     Labs:  Results from last 7 days   Lab Units 02/12/24  0435 02/10/24  0516 02/09/24  0630   WBC Thousand/uL 3.85*   < > 4.21*   HEMOGLOBIN g/dL 11.7   < > 10.2*   HEMATOCRIT % 35.3   < > 31.5*   PLATELETS Thousands/uL 251   < > 176   NEUTROS PCT %  --   --  72   LYMPHS PCT %  --   --  13*   MONOS PCT %  --   --  11   EOS PCT %  --   --  2    < > = values in this interval not displayed.     Results from last 7 days   Lab Units 02/12/24  0522   SODIUM mmol/L 134*   POTASSIUM mmol/L 3.6   CHLORIDE mmol/L 100   CO2 mmol/L 22   BUN mg/dL 11   CREATININE mg/dL 0.60   ANION GAP mmol/L 12   CALCIUM mg/dL 8.4   ALBUMIN g/dL 3.6   TOTAL BILIRUBIN mg/dL 0.89   ALK PHOS U/L 70   ALT U/L <3*   AST U/L 4*   GLUCOSE RANDOM mg/dL 81     Results from last 7 days   Lab Units  02/08/24  1535   INR  1.26*         Results from last 7 days   Lab Units 02/08/24  0450   HEMOGLOBIN A1C % 5.2           Lines/Drains:  Invasive Devices       Peripheral Intravenous Line  Duration             Peripheral IV 02/12/24 Left;Proximal;Ventral (anterior) Forearm 2 days              Drain  Duration             External Urinary Catheter 2 days                          Imaging: No pertinent imaging reviewed.    Recent Cultures (last 7 days):         Last 24 Hours Medication List:   Current Facility-Administered Medications   Medication Dose Route Frequency Provider Last Rate    apixaban  5 mg Oral BID Ching Clements MD      carbidopa-levodopa  3 tablet Oral 4x Daily Ching Clements MD      cholecalciferol  1,000 Units Oral Daily Ching Clements MD      cyanocobalamin  1,000 mcg Intramuscular Weekly Srikanth Flowers MD      Followed by    [START ON 3/4/2024] cyanocobalamin  1,000 mcg Intramuscular Q30 Days Srikanth Flowers MD      divalproex sodium  500 mg Oral Q12H Ching Clements MD      ezetimibe  10 mg Oral HS Ching Clements MD      levETIRAcetam  1,500 mg Oral Q12H CESAR Ching Clements MD      LORazepam  1 mg Intravenous Q6H PRN Ching Clements MD      potassium chloride  20 mEq Oral Daily Ching Clements MD      triamterene-hydrochlorothiazide  1 tablet Oral Daily Ching Clements MD          Today, Patient Was Seen By: Wellington Cain MD    **Please Note: This note may have been constructed using a voice recognition system.**

## 2024-02-14 NOTE — PHYSICAL THERAPY NOTE
"                                                      Physical Therapy Evaluation    Patient Name: Kamilla Pat    Today's Date: 2/14/2024     Problem List  Principal Problem:    Seizure-like activity (HCC)  Active Problems:    Parkinson disease    Pure hypercholesterolemia    Ambulatory dysfunction    History of pulmonary embolism    CKD (chronic kidney disease) stage 2, GFR 60-89 ml/min       Past Medical History  Past Medical History:   Diagnosis Date    Anxiety     Hepatitis C 04/01/2017    screening Negative    High cholesterol     Hip pain     History of low potassium     Hyponatremia     Lower extremity edema     Obesity     Osteopenia     Overweight     Thigh pain         Past Surgical History  Past Surgical History:   Procedure Laterality Date    BLADDER SURGERY      CHOLECYSTECTOMY      COLOGUARD (HISTORICAL)  07/02/2018    COLONOSCOPY  01/01/2007    DXA PROCEDURE (HISTORICAL)  03/27/2014    MAMMO (HISTORICAL)  08/31/2016    REDUCTION MAMMAPLASTY      REPLACEMENT TOTAL KNEE Bilateral     TOTAL HIP ARTHROPLASTY Left 01/01/2015    TOTAL KNEE ARTHROPLASTY Left            02/14/24 0917   PT Last Visit   PT Visit Date 02/14/24   Note Type   Note type Evaluation   Pain Assessment   Pain Assessment Tool 0-10   Pain Score No Pain   Restrictions/Precautions   Weight Bearing Precautions Per Order No   Other Precautions Chair Alarm;Bed Alarm;Aspiration;Seizure;Fall Risk;Telemetry;Fluid restriction   Home Living   Type of Home House   Home Layout Multi-level;Bed/bath upstairs  (0 GAMALIEL, \"split level home\" with 6 step up to main level)   Bathroom Shower/Tub Tub/shower unit   Bathroom Toilet Standard   Bathroom Equipment Commode;Grab bars in shower;Tub transfer bench   Bathroom Accessibility Accessible   Home Equipment Walker;Cane  (RW)   Prior Function   Level of [place] Independent with ADLs;Independent with functional mobility;Needs assistance with IADLS  ( assists with IADLs and donning shoes and " "socks)   Lives With Spouse  ()   Receives Help From Family   IADLs Family/Friend/Other provides transportation;Independent with medication management;Independent with meal prep   Falls in the last 6 months 1 to 4  (2 falls, \"My legs just gave out.\")   Vocational Retired   Comments Used RW PTA   General   Additional Pertinent History Hx PE, Hx PD, osteopenia   Family/Caregiver Present No   Cognition   Overall Cognitive Status WFL   Arousal/Participation Cooperative   Attention Attends with cues to redirect   Orientation Level Oriented X4   Memory Within functional limits   Following Commands Follows one step commands with increased time or repetition   RLE Assessment   RLE Assessment WFL   LLE Assessment   LLE Assessment WFL   Light Touch   RLE Light Touch Grossly intact   LLE Light Touch Grossly intact   Bed Mobility   Supine to Sit 3  Moderate assistance   Additional items Assist x 1;HOB elevated;Bedrails;Increased time required;Verbal cues;LE management   Transfers   Sit to Stand 4  Minimal assistance   Additional items Assist x 2;Bedrails;Increased time required;Verbal cues   Stand to Sit 4  Minimal assistance   Additional items Assist x 2;Armrests;Increased time required;Verbal cues   Additional Comments c RW, mild VCs for hand placement   Ambulation/Elevation   Gait pattern Improper Weight shift;Narrow HARRY;Forward Flexion;Decreased foot clearance;Shuffling;Short stride;Excessively slow   Gait Assistance 4  Minimal assist   Additional items Assist x 1;Other (Comment)  (+ chair follow)   Assistive Device Rolling walker   Distance 5ft x 1   Ambulation/Elevation Additional Comments amb limited 2/2 sig B/L LE weakness, required chair follow to sit   Balance   Static Sitting Fair +   Dynamic Sitting Fair -   Static Standing Fair -   Dynamic Standing Poor +   Ambulatory Poor   Endurance Deficit   Endurance Deficit Yes   Endurance Deficit Description rapid onset fatigue and weakness   Activity Tolerance "   Activity Tolerance Patient limited by fatigue   Medical Staff Made Aware PT IVETTE Cintron SOT Gigi   Nurse Made Aware yes-cleared   Assessment   Prognosis Good   Problem List Decreased strength;Decreased range of motion;Decreased endurance;Impaired balance;Decreased mobility;Obesity   Assessment Pt is a 78 y.o. F, admitted on 2/11/2024, with seizure-like activity. Symptoms  at admission consist of: seizure-like activity for 15 minutes x 3 past nights. Comorbidities/PMH consist of: Hx PD, osteopenia, and Hx PE. Pt presentation consistent with high complexity due to continuous monitoring, evolving medical status, sig decreased mobility tolerance compared to baseline, inaccessible home setup and PMH. Upon evaluation pt required assist levels of modAx1 for bed mobility, minAx2 for transfers, and minAx1 + RW + chair follow for ambulation. Pt amb severely limited 2/2 B/L LE weakness and fatigue, amb distance of 5 ft. Pt demo'd short shuffling gait pattern with decreased weight shift and poor foot clearance. The current strength and endurance deficits lead to a limitation with all functional mobility and WBing, which restricts her from returning to independent ADLs and IADLs. The patient's AM-PAC Basic Mobility Inpatient Short Form Raw Score is 12. A Raw score of less than 16 suggests the patient may benefit from discharge to post-acute rehabilitation services. Please also refer to the recommendation of the Physical Therapist for safe discharge planning. Pt was left sitting in bedside chair with alarm donned, call bell and phone within reach. Pt would benefit from skilled PT intervention to address above mentioned functional deficits to maximize their safety in home and the community, decrease caregiver burden, maximize functional mobility ashwin, facilitate return to PLOF and maximize strength and endurance to increase independence.   Barriers to Discharge Inaccessible home environment;Decreased caregiver support   Goals    Patient Goals to get home   STG Expiration Date 02/28/24   Short Term Goal #1 In 14 days time, pt will:  1. Increase bed mobility to independent to decrease caregiver burden and improve functional mobility.  2. Increase transfer ashwin to independent to improve functional mobility and increase independence.   3. Improve amb ashwin to over 150 ft with mod I or better to increase functinal mobility, increase independence and decrease caregiver burden.  4. Improve stair/elevation ashwin to a level of S or better x 7 steps to increase functional mobility and maximize independence to negotiate home and perform ADLs.  5. Increase balance by 1/2 grade to increase independence and decrease fall risk and increase safety in home/community.   PT Treatment Day 0   Plan   Treatment/Interventions Functional transfer training;LE strengthening/ROM;Elevations;Therapeutic exercise;Endurance training;Bed mobility;Gait training;Spoke to nursing;Spoke to case management;OT;Equipment eval/education   PT Frequency 3-5x/wk   Discharge Recommendation   Rehab Resource Intensity Level, PT I (Maximum Resource Intensity)   Equipment Recommended Walker   Walker Package Recommended Wheeled walker   Change/add to Walker Package? No   Additional Comments Pt confirmed she has and intermittently uses PTA   AM-PAC Basic Mobility Inpatient   Turning in Flat Bed Without Bedrails 2   Lying on Back to Sitting on Edge of Flat Bed Without Bedrails 2   Moving Bed to Chair 2   Standing Up From Chair Using Arms 2   Walk in Room 3   Climb 3-5 Stairs With Railing 1   Basic Mobility Inpatient Raw Score 12   Basic Mobility Standardized Score 32.23   Highest Level Of Mobility   -[facility] Goal 4: Move to chair/commode   -HLM Achieved 5: Stand (1 or more minutes)   Modified Bynum Scale   Modified Bynum Scale 4   Barthel Index   Feeding 10   Bathing 0   Grooming Score 0   Dressing Score 5   Bladder Score 5   Bowels Score 5   Toilet Use Score 5   Transfers (Bed/Chair) Score  5   Mobility (Level Surface) Score 0   Stairs Score 0   Barthel Index Score 35   End of Consult   Patient Position at End of Consult Bedside chair;Bed/Chair alarm activated;All needs within reach       London English, SPT

## 2024-02-14 NOTE — PLAN OF CARE
Problem: PHYSICAL THERAPY ADULT  Goal: Performs mobility at highest level of function for planned discharge setting.  See evaluation for individualized goals.  Description: Treatment/Interventions: Functional transfer training, LE strengthening/ROM, Elevations, Therapeutic exercise, Endurance training, Bed mobility, Gait training, Spoke to nursing, Spoke to case management, OT, Equipment eval/education  Equipment Recommended: Walker       See flowsheet documentation for full assessment, interventions and recommendations.  Note: Prognosis: Good  Problem List: Decreased strength, Decreased range of motion, Decreased endurance, Impaired balance, Decreased mobility, Obesity  Assessment: Pt is a 78 y.o. F, admitted on 2/11/2024, with seizure-like activity. Symptoms  at admission consist of: seizure-like activity for 15 minutes x 3 past nights. Comorbidities/PMH consist of: Hx PD, osteopenia, and Hx PE. Pt presentation consistent with high complexity due to continuous monitoring, evolving medical status, sig decreased mobility tolerance compared to baseline, inaccessible home setup and PMH. Upon evaluation pt required assist levels of modAx1 for bed mobility, minAx2 for transfers, and minAx1 + RW + chair follow for ambulation. Pt amb severely limited 2/2 B/L LE weakness and fatigue, amb distance of 5 ft. Pt demo'd short shuffling gait pattern with decreased weight shift and poor foot clearance. The current strength and endurance deficits lead to a limitation with all functional mobility and WBing, which restricts her from returning to independent ADLs and IADLs. The patient's AM-PAC Basic Mobility Inpatient Short Form Raw Score is 12. A Raw score of less than 16 suggests the patient may benefit from discharge to post-acute rehabilitation services. Please also refer to the recommendation of the Physical Therapist for safe discharge planning. Pt was left sitting in bedside chair with alarm donned, call bell and phone  within reach. Pt would benefit from skilled PT intervention to address above mentioned functional deficits to maximize their safety in home and the community, decrease caregiver burden, maximize functional mobility ashwin, facilitate return to PLOF and maximize strength and endurance to increase independence.  Barriers to Discharge: Inaccessible home environment, Decreased caregiver support     Rehab Resource Intensity Level, PT: I (Maximum Resource Intensity)    See flowsheet documentation for full assessment.

## 2024-02-14 NOTE — PLAN OF CARE
"  Problem: OCCUPATIONAL THERAPY ADULT  Goal: Performs self-care activities at highest level of function for planned discharge setting.  See evaluation for individualized goals.  Description: Treatment Interventions: ADL retraining, Functional transfer training, Endurance training, Cognitive reorientation, Patient/family training, Equipment evaluation/education, Compensatory technique education, Energy conservation, Activityengagement          See flowsheet documentation for full assessment, interventions and recommendations.   Note: Limitation: Decreased ADL status, Decreased Safe judgement during ADL, Decreased cognition, Decreased endurance, Decreased self-care trans, Decreased high-level ADLs  Prognosis: Fair  Assessment: Pt seen for Occupational Therapy Evaluation. Pt is 78 y.o. female admitted to [facility] on 2/11/2024 with Seizure-like activity (HCC), ambulation dysfunction, Parkinsons, and CKD. Pt has a past medical history of Anxiety, Hepatitis C, High cholesterol, Hip pain, History of low potassium, Hyponatremia, Lower extremity edema, Obesity, Osteopenia, Overweight, and Thigh pain. Pt presented supine in bed no recollection of unconsciousness as per pt family report. Pt lives with spouse in a split level home with 0 steps to get in, but 6 steps to 2nd level for bed/bath. Pt experienced 2-3 fall(s) in the past 6 months due to \"legs giving out\". Pt was I with ADLs, functional mobility, and transfers. Requires assistance for IADLs and transportation. Pt was A/Ox 4; with decrease of safety precautions with RW, and short term memory. Pt required Mod Ax1 for bed mobility, Min Ax2 for standing and Min Ax1 for functional mobility + SBA with chair follow, and Min Ax1 for ADLs. Pt currently presents with impairments in the following categories: functional mobility, transfers, difficulty performing ADLs/IADLs (self-care, grooming, cooking, cleaning), activity tolerance, endurance, standing balance/tolerance " and sitting balance/tolerance. Pt fatigue limits pt's ability to safely engage in all baseline areas of occupation including those listed above. The patient's raw score on the -PAC Daily Activity Inpatient Short Form is 15. A raw score of less than 19 suggests the patient may benefit from discharge to post-acute rehabilitation services. Please refer to the recommendation of the Occupational Therapist for safe discharge planning. From OT standpoint  recommend acute rehab max I level of intensity upon D/C.  OT will continue to follow to address the below stated goals.     Rehab Resource Intensity Level, OT: I (Maximum Resource Intensity)

## 2024-02-14 NOTE — ASSESSMENT & PLAN NOTE
Patient was recently admitted from 2/6 to 2/8 for seizure-like activity as reported generalized shaking and jerking with loss of consciousness per family.    MRI brain on that admission was unremarkable.  Patient evaluated by neurology and was discharged on Keppra however and route to home she had another episode hence presented again to [facility].  Keppra was increased from 750 to 1000 mg twice daily.  Case was discussed with neurology and recommended transfer to [facility] for video EEG.  Continue Ativan as needed.  Started on seizure prophylaxis.  Neurology recommendations  No further evaluation is indicated at this time. No change in medications or dose at this time.

## 2024-02-15 VITALS
HEART RATE: 87 BPM | SYSTOLIC BLOOD PRESSURE: 121 MMHG | RESPIRATION RATE: 16 BRPM | TEMPERATURE: 98.8 F | OXYGEN SATURATION: 94 % | DIASTOLIC BLOOD PRESSURE: 71 MMHG

## 2024-02-15 LAB — SARS-COV-2 RNA RESP QL NAA+PROBE: POSITIVE

## 2024-02-15 PROCEDURE — 95720 EEG PHY/QHP EA INCR W/VEEG: CPT | Performed by: PSYCHIATRY & NEUROLOGY

## 2024-02-15 PROCEDURE — 99239 HOSP IP/OBS DSCHRG MGMT >30: CPT | Performed by: FAMILY MEDICINE

## 2024-02-15 PROCEDURE — 87635 SARS-COV-2 COVID-19 AMP PRB: CPT | Performed by: FAMILY MEDICINE

## 2024-02-15 RX ORDER — TRIAMTERENE AND HYDROCHLOROTHIAZIDE 37.5; 25 MG/1; MG/1
1 TABLET ORAL DAILY
Qty: 30 TABLET | Refills: 0
Start: 2024-02-16

## 2024-02-15 RX ORDER — LEVETIRACETAM 750 MG/1
1500 TABLET ORAL EVERY 12 HOURS SCHEDULED
Qty: 60 TABLET | Refills: 0
Start: 2024-02-15

## 2024-02-15 RX ORDER — DIVALPROEX SODIUM 500 MG/1
500 TABLET, EXTENDED RELEASE ORAL EVERY 12 HOURS
Qty: 60 TABLET | Refills: 0
Start: 2024-02-15

## 2024-02-15 RX ORDER — CYANOCOBALAMIN 1000 UG/ML
INJECTION, SOLUTION INTRAMUSCULAR; SUBCUTANEOUS WEEKLY
Qty: 3 ML | Refills: 0
Start: 2024-02-19 | End: 2024-08-01

## 2024-02-15 RX ORDER — POTASSIUM CHLORIDE 20 MEQ/1
20 TABLET, EXTENDED RELEASE ORAL DAILY
Qty: 30 TABLET | Refills: 0
Start: 2024-02-16

## 2024-02-15 RX ADMIN — DIVALPROEX SODIUM 500 MG: 500 TABLET, EXTENDED RELEASE ORAL at 09:22

## 2024-02-15 RX ADMIN — Medication 1000 UNITS: at 09:22

## 2024-02-15 RX ADMIN — APIXABAN 5 MG: 5 TABLET, FILM COATED ORAL at 09:22

## 2024-02-15 RX ADMIN — LEVETIRACETAM 1500 MG: 750 TABLET, FILM COATED ORAL at 09:22

## 2024-02-15 RX ADMIN — CARBIDOPA AND LEVODOPA 3 TABLET: 25; 100 TABLET ORAL at 11:43

## 2024-02-15 RX ADMIN — POTASSIUM CHLORIDE 20 MEQ: 1500 TABLET, EXTENDED RELEASE ORAL at 09:22

## 2024-02-15 RX ADMIN — TRIAMTERENE AND HYDROCHLOROTHIAZIDE 1 TABLET: 37.5; 25 TABLET ORAL at 09:22

## 2024-02-15 RX ADMIN — CARBIDOPA AND LEVODOPA 3 TABLET: 25; 100 TABLET ORAL at 09:22

## 2024-02-15 NOTE — ASSESSMENT & PLAN NOTE
Patient was recently admitted from 2/6 to 2/8 for seizure-like activity as reported generalized shaking and jerking with loss of consciousness per family.    MRI brain on that admission was unremarkable.  Patient evaluated by neurology and was discharged on Keppra however and route to home she had another episode hence presented again to [facility].  Keppra was increased from 750 to 1000 mg twice daily.  Case was discussed with neurology and recommended transfer to [facility] for video EEG.  Continue Ativan as needed.  Started on seizure prophylaxis.  Neurology recommendations  No further evaluation is indicated at this time. No change in medications or dose at this time.

## 2024-02-15 NOTE — PROGRESS NOTES
Patient:    MRN:  [id number]    Carin Request ID:  [id number]    Level of care reserved:  Inpatient Rehab Facility    Partner Reserved:  [facility] [place],  [place] [address] [phone]    Clinical needs requested:    Geography searched:  10 miles around [id number]    Start of Service:    Request sent:  3:02pm EST on 2/14/2024 by Erasmo Cash    Partner reserved:  10:42am EST on 2/15/2024 by Erasmo Cash    Choice list shared:

## 2024-02-15 NOTE — CASE MANAGEMENT
Case Management Discharge Planning Note    Patient name Kamilla Pat  Location [facility] 717/[facility] 717-01 MRN [id number]  : 1945 Date 2/15/2024       Current Admission Date: 2024  Current Admission Diagnosis:Seizure-like activity (HCC)   Patient Active Problem List    Diagnosis Date Noted    Anxiety 2024    History of pulmonary embolism 2024    CKD (chronic kidney disease) stage 2, GFR 60-89 ml/min 2024    Generalized weakness 2024    Seizure-like activity (HCC) 2024    Ambulatory dysfunction 2024    Multiple subsegmental pulmonary emboli without acute cor pulmonale (HCC) 2024    Pure hypercholesterolemia 06/10/2020    Edema, lower extremity 06/10/2020    CKD (chronic kidney disease) stage 3, GFR 30-59 ml/min (HCC) 06/10/2020    Parkinson disease 2020      LOS (days): 4  Geometric Mean LOS (GMLOS) (days): 2.7  Days to GMLOS:-1.1     OBJECTIVE:  Risk of Unplanned Readmission Score: 15.65         Current admission status: Inpatient   Preferred Pharmacy:   [facility]/pharmacy #[id number] [place] [id number] [address] [address]  Phone: [phone] Fax: [phone]    Primary Care Provider: Olivia Blackwood MD    Primary Insurance: MEDICARE  Secondary Insurance: [employer]    DISCHARGE DETAILS:    Discharge planning discussed with:: Patient and spouse at the bedside  [place] of Choice: Yes     CM contacted family/caregiver?: Yes  Were Treatment Team discharge recommendations reviewed with patient/caregiver?: Yes     Were patient/caregiver advised of the risks associated with not following Treatment Team discharge recommendations?: Yes    Contacts  Patient Contacts: darya Cruz  Relationship to Patient:: Family  Contact Method: In Person  Reason/Outcome: Discharge Planning         Other Referral/Resources/Interventions Provided:  Interventions: Acute Rehab    Would you like to participate in our [facility] service program?  : No -  Declined    Treatment Team Recommendation: Acute Rehab  Discharge Destination Plan:: Acute Rehab  Transport at Discharge : Wheelchair van     Number/Name of Dispatcher: [facility] claimed the ride for Kamilla Pat in unit/room [facility] 717 bed [facility] 717-01, and will arrive on 02/15/2024 at 1:00pm EST     ETA of Transport (Date): 02/15/24  ETA of Transport (Time): 1300            Additional Comments: Patient accepted to [facility]. This is their choice and preference location. Patient and family agree and verbalize understanding of the discharge plan.    Accepting Facility Name, City & State : [facility]  Receiving Facility/Agency Phone Number: [phone]  Facility/Agency Fax Number: [phone]

## 2024-02-15 NOTE — DISCHARGE SUMMARY
[facility]  Discharge- Kamilla Pat 1945, 78 y.o. female MRN: [id number]  Unit/Bed#: [facility] 717-01 Encounter: [id number]  Primary Care Provider: Olivia Blackwood MD   Date and time admitted to hospital: 2/11/2024  4:09 PM    * Seizure-like activity (HCC)  Assessment & Plan  Patient was recently admitted from 2/6 to 2/8 for seizure-like activity as reported generalized shaking and jerking with loss of consciousness per family.    MRI brain on that admission was unremarkable.  Patient evaluated by neurology and was discharged on Keppra however and route to home she had another episode hence presented again to [facility].  Keppra was increased from 750 to 1000 mg twice daily.  Case was discussed with neurology and recommended transfer to [facility] for video EEG.  Continue Ativan as needed.  Started on seizure prophylaxis.  Neurology recommendations  No further evaluation is indicated at this time. No change in medications or dose at this time.     CKD (chronic kidney disease) stage 2, GFR 60-89 ml/min  Assessment & Plan  Lab Results   Component Value Date    EGFR 88 02/12/2024    EGFR 74 02/11/2024    EGFR 86 02/10/2024    CREATININE 0.60 02/12/2024    CREATININE 0.78 02/11/2024    CREATININE 0.66 02/10/2024     Creatinine at baseline.  Avoid nephrotoxins and hypotension.    History of pulmonary embolism  Assessment & Plan  Diagnosed with PE on 1/11/2024.  Currently on Eliquis 5 mg twice a day.  As per her previous discharge summary she has to be on Eliquis for total 6 months.  Continue Eliquis 5 mg twice daily    Ambulatory dysfunction  Assessment & Plan  Will have PT/OT evaluate   Patient will require acute rehab    Pure hypercholesterolemia  Assessment & Plan  Continue Zetia at home dosage.    Parkinson disease  Assessment & Plan  Currently taking Sinemet immediate release 3 tablets 4 times daily.  Patient was seen by movement disorder specialist in the  month of December 2023 and increase the dose.  The first-time seizure that she had was in December/2023.  Patient does not recall if the seizure onset is prior to or after the dose adjustment.  She has been feeling restless, agitated, anxious, and having insomnia.  Suspecting increase in dopamine levels.      Plan:  Continue home dosage Sinemet        Medical Problems       Resolved Problems  Date Reviewed: 2/15/2024   None       Discharging Physician / Practitioner: Wellington Cain MD  PCP: Olivia Blackwood MD  Admission Date:   Admission Orders (From admission, onward)       Ordered        02/11/24 1611  Inpatient Admission  Once                          Discharge Date: 02/15/24    Consultations During Hospital Stay:  Neurology  PT/OT    Procedures Performed:   None    Significant Findings / Test Results:   MRI: Stable MRI of the brain with no acute intracranial abnormality. 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     Incidental Findings:   None    Test Results Pending at Discharge (will require follow up):   None     Outpatient Tests Requested:  None    Complications:  None    Reason for Admission: Seizure-like activity    Hospital Course:   Kamilla Pat is a 78 y.o. female patient who originally presented to the hospital on 2/11/2024 due to seizure-like activity.  Patient was recently admitted to [facility] from February 6 to February 8 where she was reported to have seizure-like activity.  Patient was discharged on antiepileptic medication and family noted that she had more seizure-like activity which prompted them to take her to the hospital.  Patient was evaluated by neurology, they reviewed medications and believe that she is currently optimized in regards to antiepileptic drugs.  Patient was evaluated by PT OT who recommended patient to go to acute rehab.      Please see above list of diagnoses and related plan for additional information.      Condition at Discharge: stable    Discharge Day Visit / Exam:   Subjective: Is a very pleasant 78-year-old female who was seen evaluated today at bedside.  Patient has no complaints at this time.  Vitals: Blood Pressure: 121/71 (02/15/24 0708)  Pulse: 87 (02/15/24 0708)  Temperature: 98.8 °F (37.1 °C) (02/15/24 0708)  Temp Source: Oral (02/15/24 0708)  Respirations: 16 (02/13/24 2009)  SpO2: 94 % (02/15/24 0708)  Exam:   Physical Exam  Vitals reviewed.   Constitutional:       General: She is not in acute distress.     Appearance: She is not ill-appearing.   HENT:      Head: Normocephalic.   Eyes:      Conjunctiva/sclera: Conjunctivae normal.   Cardiovascular:      Rate and Rhythm: Normal rate and regular rhythm.      Pulses: Normal pulses.      Heart sounds: Normal heart sounds.   Pulmonary:      Effort: Pulmonary effort is normal.   Abdominal:      General: Abdomen is flat.      Palpations: Abdomen is soft.      Tenderness: There is no abdominal tenderness.   Skin:     General: Skin is warm and dry.   Neurological:      General: No focal deficit present.      Mental Status: She is alert. Mental status is at baseline.          Discussion with Family: Updated  () at bedside.    Discharge instructions/Information to patient and family:   See after visit summary for information provided to patient and family.      Provisions for Follow-Up Care:  See after visit summary for information related to follow-up care and any pertinent home health orders.      Mobility at time of Discharge:   Basic Mobility Inpatient Raw Score: 12  -HLM Goal: 4: Move to chair/commode  -HLM Achieved: 5: Stand (1 or more minutes)  HLM Goal achieved. Continue to encourage appropriate mobility.     Disposition:   Acute Rehab at [facility]    Planned Readmission: None     Discharge Statement:  I spent 40 minutes discharging the patient. This time was spent on the day of discharge. I had direct contact with the patient  on the day of discharge. Greater than 50% of the total time was spent examining patient, answering all patient questions, arranging and discussing plan of care with patient as well as directly providing post-discharge instructions.  Additional time then spent on discharge activities.    Discharge Medications:  See after visit summary for reconciled discharge medications provided to patient and/or family.      **Please Note: This note may have been constructed using a voice recognition system**

## 2024-02-15 NOTE — [facility] ADMISSION
Reviewed patient's case with [facility] physician - patient is approved for [facility] pending medical stability, functional progress and bed availability. CM has been updated.    Noted per CM that patient/family have chosen another rehab facility. Please notify with any changes.

## 2024-02-15 NOTE — RESTORATIVE TECHNICIAN NOTE
Restorative Technician Note      Patient Name: Kamilla Pat     Note Type: Mobility  Patient Position Upon Consult: Supine  Activity Performed: Ambulated; Dangled; Stood  Assistive Device: Other (Comment) (Assist x2)  Education Provided: Yes  Patient Position at End of Consult: Bedside chair; All needs within reach; Bed/Chair alarm activated    Artemio BLOCK, Restorative Technician,

## 2024-03-17 ENCOUNTER — HOME CARE VISIT (OUTPATIENT)
Dept: HOME HEALTH SERVICES | Facility: HOME HEALTHCARE | Age: 79
End: 2024-03-17
Payer: MEDICARE